# Patient Record
Sex: FEMALE | Race: WHITE | NOT HISPANIC OR LATINO | Employment: OTHER
[De-identification: names, ages, dates, MRNs, and addresses within clinical notes are randomized per-mention and may not be internally consistent; named-entity substitution may affect disease eponyms.]

---

## 2017-01-29 ENCOUNTER — RECORDS - HEALTHEAST (OUTPATIENT)
Dept: ADMINISTRATIVE | Facility: OTHER | Age: 72
End: 2017-01-29

## 2017-01-31 ENCOUNTER — COMMUNICATION - HEALTHEAST (OUTPATIENT)
Dept: INTERNAL MEDICINE | Facility: CLINIC | Age: 72
End: 2017-01-31

## 2017-02-05 ENCOUNTER — RECORDS - HEALTHEAST (OUTPATIENT)
Dept: ADMINISTRATIVE | Facility: OTHER | Age: 72
End: 2017-02-05

## 2017-02-08 ENCOUNTER — OFFICE VISIT - HEALTHEAST (OUTPATIENT)
Dept: FAMILY MEDICINE | Facility: CLINIC | Age: 72
End: 2017-02-08

## 2017-02-08 DIAGNOSIS — R50.9 FEVER: ICD-10-CM

## 2017-02-08 DIAGNOSIS — R05.9 COUGH: ICD-10-CM

## 2017-02-10 ENCOUNTER — COMMUNICATION - HEALTHEAST (OUTPATIENT)
Dept: FAMILY MEDICINE | Facility: CLINIC | Age: 72
End: 2017-02-10

## 2017-02-13 ENCOUNTER — OFFICE VISIT - HEALTHEAST (OUTPATIENT)
Dept: FAMILY MEDICINE | Facility: CLINIC | Age: 72
End: 2017-02-13

## 2017-02-13 ENCOUNTER — COMMUNICATION - HEALTHEAST (OUTPATIENT)
Dept: FAMILY MEDICINE | Facility: CLINIC | Age: 72
End: 2017-02-13

## 2017-02-13 DIAGNOSIS — R53.83 FATIGUE: ICD-10-CM

## 2017-02-13 DIAGNOSIS — R05.9 COUGH: ICD-10-CM

## 2017-02-14 ENCOUNTER — COMMUNICATION - HEALTHEAST (OUTPATIENT)
Dept: FAMILY MEDICINE | Facility: CLINIC | Age: 72
End: 2017-02-14

## 2017-02-17 ENCOUNTER — COMMUNICATION - HEALTHEAST (OUTPATIENT)
Dept: FAMILY MEDICINE | Facility: CLINIC | Age: 72
End: 2017-02-17

## 2017-03-28 ENCOUNTER — COMMUNICATION - HEALTHEAST (OUTPATIENT)
Dept: FAMILY MEDICINE | Facility: CLINIC | Age: 72
End: 2017-03-28

## 2017-03-28 ENCOUNTER — OFFICE VISIT - HEALTHEAST (OUTPATIENT)
Dept: FAMILY MEDICINE | Facility: CLINIC | Age: 72
End: 2017-03-28

## 2017-03-28 DIAGNOSIS — E03.9 HYPOTHYROIDISM: ICD-10-CM

## 2017-03-28 DIAGNOSIS — Z00.00 PHYSICAL EXAM: ICD-10-CM

## 2017-03-28 DIAGNOSIS — R53.83 FATIGUE: ICD-10-CM

## 2017-03-28 DIAGNOSIS — M54.50 LOW BACK PAIN: ICD-10-CM

## 2017-03-28 DIAGNOSIS — R42 VERTIGO: ICD-10-CM

## 2017-03-28 DIAGNOSIS — K59.00 CONSTIPATION: ICD-10-CM

## 2017-03-28 DIAGNOSIS — D64.9 ANEMIA: ICD-10-CM

## 2017-03-28 DIAGNOSIS — R01.1 HEART MURMUR: ICD-10-CM

## 2017-03-28 DIAGNOSIS — C54.1 ENDOMETRIAL CANCER (H): ICD-10-CM

## 2017-03-28 DIAGNOSIS — G47.00 INSOMNIA, UNSPECIFIED: ICD-10-CM

## 2017-03-28 LAB
CHOLEST SERPL-MCNC: 201 MG/DL
FASTING STATUS PATIENT QL REPORTED: YES
HDLC SERPL-MCNC: 88 MG/DL
LDLC SERPL CALC-MCNC: 100 MG/DL
TRIGL SERPL-MCNC: 63 MG/DL

## 2017-03-28 ASSESSMENT — MIFFLIN-ST. JEOR: SCORE: 1248.63

## 2017-04-05 ENCOUNTER — RECORDS - HEALTHEAST (OUTPATIENT)
Dept: BONE DENSITY | Facility: CLINIC | Age: 72
End: 2017-04-05

## 2017-04-05 ENCOUNTER — RECORDS - HEALTHEAST (OUTPATIENT)
Dept: ADMINISTRATIVE | Facility: OTHER | Age: 72
End: 2017-04-05

## 2017-04-05 DIAGNOSIS — Z78.0 ASYMPTOMATIC MENOPAUSAL STATE: ICD-10-CM

## 2017-04-05 DIAGNOSIS — Z00.00 ENCOUNTER FOR GENERAL ADULT MEDICAL EXAMINATION WITHOUT ABNORMAL FINDINGS: ICD-10-CM

## 2017-04-07 ENCOUNTER — HOSPITAL ENCOUNTER (OUTPATIENT)
Dept: CARDIOLOGY | Facility: CLINIC | Age: 72
Discharge: HOME | End: 2017-04-07
Attending: FAMILY MEDICINE

## 2017-04-07 DIAGNOSIS — R53.83 FATIGUE: ICD-10-CM

## 2017-04-07 DIAGNOSIS — R01.1 HEART MURMUR: ICD-10-CM

## 2017-04-07 LAB
AORTIC ROOT: 3.1 CM
AORTIC VALVE MEAN VELOCITY: 141 CM/S
AV DIMENSIONLESS INDEX VTI: 0.5
AV MEAN GRADIENT: 9 MMHG
AV PEAK GRADIENT: 17.6 MMHG
AV VALVE AREA: 1.6 CM2
AV VELOCITY RATIO: 0.5
BSA FOR ECHO PROCEDURE: 1.81 M2
CV BLOOD PRESSURE: NORMAL MMHG
CV ECHO HEIGHT: 69.5 IN
CV ECHO WEIGHT: 148 LBS
DOP CALC AO PEAK VEL: 210 CM/S
DOP CALC AO VTI: 44.4 CM
DOP CALC LVOT AREA: 3.14 CM2
DOP CALC LVOT DIAMETER: 2 CM
DOP CALC LVOT PEAK VEL: 113 CM/S
DOP CALC LVOT STROKE VOLUME: 71.3 CM3
DOP CALCLVOT PEAK VEL VTI: 22.7 CM
ECHO EJECTION FRACTION ESTIMATED: 65 %
EJECTION FRACTION: 72 % (ref 55–75)
FRACTIONAL SHORTENING: 38 % (ref 28–44)
INTERVENTRICULAR SEPTUM IN END DIASTOLE: 0.75 CM (ref 0.6–0.9)
IVS/PW RATIO: 0.9
LA AREA 1: 11.6 CM2
LA AREA 2: 9.67 CM2
LEFT ATRIUM LENGTH: 4.23 CM
LEFT ATRIUM SIZE: 3.5 CM
LEFT ATRIUM TO AORTIC ROOT RATIO: 1.13 NO UNITS
LEFT ATRIUM VOLUME INDEX: 12.5 ML/M2
LEFT ATRIUM VOLUME: 22.5 CM3
LEFT VENTRICLE CARDIAC INDEX: 3.2 L/MIN/M2
LEFT VENTRICLE CARDIAC OUTPUT: 5.7 L/MIN
LEFT VENTRICLE DIASTOLIC VOLUME INDEX: 29.3 CM3/M2 (ref 34–74)
LEFT VENTRICLE DIASTOLIC VOLUME: 53 CM3 (ref 46–106)
LEFT VENTRICLE HEART RATE: 80 BPM
LEFT VENTRICLE MASS INDEX: 96.3 G/M2
LEFT VENTRICLE SYSTOLIC VOLUME INDEX: 8.3 CM3/M2 (ref 11–31)
LEFT VENTRICLE SYSTOLIC VOLUME: 15 CM3 (ref 14–42)
LEFT VENTRICULAR INTERNAL DIMENSION IN DIASTOLE: 5.84 CM (ref 3.8–5.2)
LEFT VENTRICULAR INTERNAL DIMENSION IN SYSTOLE: 3.62 CM (ref 2.2–3.5)
LEFT VENTRICULAR MASS: 174.2 G
LEFT VENTRICULAR OUTFLOW TRACT MEAN GRADIENT: 3 MMHG
LEFT VENTRICULAR OUTFLOW TRACT MEAN VELOCITY: 77 CM/S
LEFT VENTRICULAR OUTFLOW TRACT PEAK GRADIENT: 5 MMHG
LEFT VENTRICULAR POSTERIOR WALL IN END DIASTOLE: 0.82 CM (ref 0.6–0.9)
LV STROKE VOLUME INDEX: 39.4 ML/M2
MITRAL VALVE DECELERATION SLOPE: 3070 MM/S2
MITRAL VALVE E/A RATIO: 1.1
MITRAL VALVE PRESSURE HALF-TIME: 71 MS
MV AVERAGE E/E' RATIO: 7.1 CM/S
MV DECELERATION TIME: 285 MS
MV E'TISSUE VEL-LAT: 10.9 CM/S
MV E'TISSUE VEL-MED: 8.77 CM/S
MV LATERAL E/E' RATIO: 6.4
MV MEDIAL E/E' RATIO: 8
MV PEAK A VELOCITY: 62.3 CM/S
MV PEAK E VELOCITY: 70.3 CM/S
MV VALVE AREA PRESSURE 1/2 METHOD: 3.1 CM2
NUC REST DIASTOLIC VOLUME INDEX: 2368 LBS
NUC REST SYSTOLIC VOLUME INDEX: 69.5 IN
TRICUSPID REGURGITATION PEAK PRESSURE GRADIENT: 22.8 MMHG
TRICUSPID VALVE PEAK REGURGITANT VELOCITY: 239 CM/S

## 2017-04-07 ASSESSMENT — MIFFLIN-ST. JEOR: SCORE: 1248.63

## 2017-04-10 ENCOUNTER — COMMUNICATION - HEALTHEAST (OUTPATIENT)
Dept: FAMILY MEDICINE | Facility: CLINIC | Age: 72
End: 2017-04-10

## 2017-04-11 ENCOUNTER — COMMUNICATION - HEALTHEAST (OUTPATIENT)
Dept: FAMILY MEDICINE | Facility: CLINIC | Age: 72
End: 2017-04-11

## 2017-04-12 ENCOUNTER — COMMUNICATION - HEALTHEAST (OUTPATIENT)
Dept: ADMINISTRATIVE | Facility: CLINIC | Age: 72
End: 2017-04-12

## 2017-04-28 ENCOUNTER — COMMUNICATION - HEALTHEAST (OUTPATIENT)
Dept: FAMILY MEDICINE | Facility: CLINIC | Age: 72
End: 2017-04-28

## 2017-06-15 ENCOUNTER — RECORDS - HEALTHEAST (OUTPATIENT)
Dept: ADMINISTRATIVE | Facility: OTHER | Age: 72
End: 2017-06-15

## 2017-06-26 ENCOUNTER — COMMUNICATION - HEALTHEAST (OUTPATIENT)
Dept: FAMILY MEDICINE | Facility: CLINIC | Age: 72
End: 2017-06-26

## 2017-06-26 DIAGNOSIS — E03.9 HYPOTHYROID: ICD-10-CM

## 2017-08-24 ENCOUNTER — RECORDS - HEALTHEAST (OUTPATIENT)
Dept: ADMINISTRATIVE | Facility: OTHER | Age: 72
End: 2017-08-24

## 2017-09-18 ENCOUNTER — COMMUNICATION - HEALTHEAST (OUTPATIENT)
Dept: FAMILY MEDICINE | Facility: CLINIC | Age: 72
End: 2017-09-18

## 2017-10-17 ENCOUNTER — AMBULATORY - HEALTHEAST (OUTPATIENT)
Dept: NURSING | Facility: CLINIC | Age: 72
End: 2017-10-17

## 2017-10-18 ENCOUNTER — COMMUNICATION - HEALTHEAST (OUTPATIENT)
Dept: SCHEDULING | Facility: CLINIC | Age: 72
End: 2017-10-18

## 2017-10-27 ENCOUNTER — RECORDS - HEALTHEAST (OUTPATIENT)
Dept: ADMINISTRATIVE | Facility: OTHER | Age: 72
End: 2017-10-27

## 2017-11-01 ENCOUNTER — RECORDS - HEALTHEAST (OUTPATIENT)
Dept: ADMINISTRATIVE | Facility: OTHER | Age: 72
End: 2017-11-01

## 2017-12-11 ENCOUNTER — COMMUNICATION - HEALTHEAST (OUTPATIENT)
Dept: FAMILY MEDICINE | Facility: CLINIC | Age: 72
End: 2017-12-11

## 2017-12-11 DIAGNOSIS — E03.9 HYPOTHYROID: ICD-10-CM

## 2018-01-09 ENCOUNTER — RECORDS - HEALTHEAST (OUTPATIENT)
Dept: ADMINISTRATIVE | Facility: OTHER | Age: 73
End: 2018-01-09

## 2018-02-13 ENCOUNTER — OFFICE VISIT - HEALTHEAST (OUTPATIENT)
Dept: FAMILY MEDICINE | Facility: CLINIC | Age: 73
End: 2018-02-13

## 2018-02-13 ENCOUNTER — COMMUNICATION - HEALTHEAST (OUTPATIENT)
Dept: FAMILY MEDICINE | Facility: CLINIC | Age: 73
End: 2018-02-13

## 2018-02-13 DIAGNOSIS — I35.0 AORTIC STENOSIS: ICD-10-CM

## 2018-02-13 DIAGNOSIS — Z12.31 VISIT FOR SCREENING MAMMOGRAM: ICD-10-CM

## 2018-02-13 DIAGNOSIS — M85.80 OSTEOPENIA: ICD-10-CM

## 2018-02-13 DIAGNOSIS — E03.9 HYPOTHYROIDISM: ICD-10-CM

## 2018-02-13 DIAGNOSIS — Z01.818 PREOP EXAMINATION: ICD-10-CM

## 2018-02-13 DIAGNOSIS — G47.00 INSOMNIA: ICD-10-CM

## 2018-02-13 DIAGNOSIS — Z12.39 SCREENING BREAST EXAMINATION: ICD-10-CM

## 2018-02-13 DIAGNOSIS — D64.9 ANEMIA: ICD-10-CM

## 2018-02-13 DIAGNOSIS — E03.9 HYPOTHYROID: ICD-10-CM

## 2018-02-13 DIAGNOSIS — Z12.39 SCREENING FOR BREAST CANCER: ICD-10-CM

## 2018-02-13 DIAGNOSIS — K59.00 CONSTIPATION: ICD-10-CM

## 2018-02-13 DIAGNOSIS — M54.50 LOW BACK PAIN: ICD-10-CM

## 2018-02-13 LAB
ANION GAP SERPL CALCULATED.3IONS-SCNC: 7 MMOL/L (ref 5–18)
ATRIAL RATE - MUSE: 75 BPM
BUN SERPL-MCNC: 16 MG/DL (ref 8–28)
CALCIUM SERPL-MCNC: 9.6 MG/DL (ref 8.5–10.5)
CHLORIDE BLD-SCNC: 104 MMOL/L (ref 98–107)
CO2 SERPL-SCNC: 30 MMOL/L (ref 22–31)
CREAT SERPL-MCNC: 0.73 MG/DL (ref 0.6–1.1)
DIASTOLIC BLOOD PRESSURE - MUSE: NORMAL MMHG
ERYTHROCYTE [DISTWIDTH] IN BLOOD BY AUTOMATED COUNT: 13.9 % (ref 11–14.5)
GFR SERPL CREATININE-BSD FRML MDRD: >60 ML/MIN/1.73M2
GLUCOSE BLD-MCNC: 91 MG/DL (ref 70–125)
HCT VFR BLD AUTO: 37.7 % (ref 35–47)
HGB BLD-MCNC: 12.4 G/DL (ref 12–16)
INTERPRETATION ECG - MUSE: NORMAL
MCH RBC QN AUTO: 29 PG (ref 27–34)
MCHC RBC AUTO-ENTMCNC: 32.9 G/DL (ref 32–36)
MCV RBC AUTO: 88 FL (ref 80–100)
P AXIS - MUSE: 79 DEGREES
PLATELET # BLD AUTO: 250 THOU/UL (ref 140–440)
PMV BLD AUTO: 6.4 FL (ref 7–10)
POTASSIUM BLD-SCNC: 4.2 MMOL/L (ref 3.5–5)
PR INTERVAL - MUSE: 162 MS
QRS DURATION - MUSE: 84 MS
QT - MUSE: 374 MS
QTC - MUSE: 417 MS
R AXIS - MUSE: 45 DEGREES
RBC # BLD AUTO: 4.29 MILL/UL (ref 3.8–5.4)
SODIUM SERPL-SCNC: 141 MMOL/L (ref 136–145)
SYSTOLIC BLOOD PRESSURE - MUSE: NORMAL MMHG
T AXIS - MUSE: 67 DEGREES
TSH SERPL DL<=0.005 MIU/L-ACNC: <0.01 UIU/ML (ref 0.3–5)
VENTRICULAR RATE- MUSE: 75 BPM
WBC: 6.9 THOU/UL (ref 4–11)

## 2018-02-13 ASSESSMENT — MIFFLIN-ST. JEOR: SCORE: 1238.43

## 2018-02-26 ENCOUNTER — RECORDS - HEALTHEAST (OUTPATIENT)
Dept: ADMINISTRATIVE | Facility: OTHER | Age: 73
End: 2018-02-26

## 2018-03-01 ENCOUNTER — AMBULATORY - HEALTHEAST (OUTPATIENT)
Dept: GERIATRICS | Facility: CLINIC | Age: 73
End: 2018-03-01

## 2018-03-01 ENCOUNTER — RECORDS - HEALTHEAST (OUTPATIENT)
Dept: ADMINISTRATIVE | Facility: OTHER | Age: 73
End: 2018-03-01

## 2018-03-06 ENCOUNTER — OFFICE VISIT - HEALTHEAST (OUTPATIENT)
Dept: GERIATRICS | Facility: CLINIC | Age: 73
End: 2018-03-06

## 2018-03-06 DIAGNOSIS — K59.00 CONSTIPATION: ICD-10-CM

## 2018-03-06 DIAGNOSIS — E03.9 HYPOTHYROIDISM: ICD-10-CM

## 2018-03-06 DIAGNOSIS — M54.50 LOW BACK PAIN: ICD-10-CM

## 2018-03-08 ENCOUNTER — OFFICE VISIT - HEALTHEAST (OUTPATIENT)
Dept: GERIATRICS | Facility: CLINIC | Age: 73
End: 2018-03-08

## 2018-03-08 DIAGNOSIS — I48.91 ATRIAL FIBRILLATION (H): ICD-10-CM

## 2018-03-08 DIAGNOSIS — K59.00 CONSTIPATION: ICD-10-CM

## 2018-03-08 DIAGNOSIS — M54.50 LOW BACK PAIN: ICD-10-CM

## 2018-03-09 ENCOUNTER — RECORDS - HEALTHEAST (OUTPATIENT)
Dept: ADMINISTRATIVE | Facility: OTHER | Age: 73
End: 2018-03-09

## 2018-03-12 ENCOUNTER — OFFICE VISIT - HEALTHEAST (OUTPATIENT)
Dept: FAMILY MEDICINE | Facility: CLINIC | Age: 73
End: 2018-03-12

## 2018-03-12 ENCOUNTER — AMBULATORY - HEALTHEAST (OUTPATIENT)
Dept: GERIATRICS | Facility: CLINIC | Age: 73
End: 2018-03-12

## 2018-03-12 DIAGNOSIS — M85.80 OSTEOPENIA: ICD-10-CM

## 2018-03-12 DIAGNOSIS — E03.9 HYPOTHYROIDISM: ICD-10-CM

## 2018-03-12 DIAGNOSIS — M54.50 LOW BACK PAIN: ICD-10-CM

## 2018-03-12 DIAGNOSIS — I48.91 ATRIAL FIBRILLATION (H): ICD-10-CM

## 2018-03-12 DIAGNOSIS — G47.00 INSOMNIA: ICD-10-CM

## 2018-03-13 ENCOUNTER — COMMUNICATION - HEALTHEAST (OUTPATIENT)
Dept: FAMILY MEDICINE | Facility: CLINIC | Age: 73
End: 2018-03-13

## 2018-03-13 ENCOUNTER — COMMUNICATION - HEALTHEAST (OUTPATIENT)
Dept: GERIATRICS | Facility: CLINIC | Age: 73
End: 2018-03-13

## 2018-03-13 ENCOUNTER — AMBULATORY - HEALTHEAST (OUTPATIENT)
Dept: FAMILY MEDICINE | Facility: CLINIC | Age: 73
End: 2018-03-13

## 2018-03-14 ENCOUNTER — COMMUNICATION - HEALTHEAST (OUTPATIENT)
Dept: FAMILY MEDICINE | Facility: CLINIC | Age: 73
End: 2018-03-14

## 2018-03-16 ENCOUNTER — COMMUNICATION - HEALTHEAST (OUTPATIENT)
Dept: FAMILY MEDICINE | Facility: CLINIC | Age: 73
End: 2018-03-16

## 2018-03-26 ENCOUNTER — COMMUNICATION - HEALTHEAST (OUTPATIENT)
Dept: FAMILY MEDICINE | Facility: CLINIC | Age: 73
End: 2018-03-26

## 2018-03-29 ENCOUNTER — COMMUNICATION - HEALTHEAST (OUTPATIENT)
Dept: FAMILY MEDICINE | Facility: CLINIC | Age: 73
End: 2018-03-29

## 2018-03-29 DIAGNOSIS — E03.9 HYPOTHYROIDISM: ICD-10-CM

## 2018-04-04 ENCOUNTER — COMMUNICATION - HEALTHEAST (OUTPATIENT)
Dept: FAMILY MEDICINE | Facility: CLINIC | Age: 73
End: 2018-04-04

## 2018-04-10 ENCOUNTER — COMMUNICATION - HEALTHEAST (OUTPATIENT)
Dept: FAMILY MEDICINE | Facility: CLINIC | Age: 73
End: 2018-04-10

## 2018-04-11 ENCOUNTER — AMBULATORY - HEALTHEAST (OUTPATIENT)
Dept: FAMILY MEDICINE | Facility: CLINIC | Age: 73
End: 2018-04-11

## 2018-04-12 ENCOUNTER — RECORDS - HEALTHEAST (OUTPATIENT)
Dept: ADMINISTRATIVE | Facility: OTHER | Age: 73
End: 2018-04-12

## 2018-04-18 ENCOUNTER — RECORDS - HEALTHEAST (OUTPATIENT)
Dept: ADMINISTRATIVE | Facility: OTHER | Age: 73
End: 2018-04-18

## 2018-04-26 ENCOUNTER — COMMUNICATION - HEALTHEAST (OUTPATIENT)
Dept: FAMILY MEDICINE | Facility: CLINIC | Age: 73
End: 2018-04-26

## 2018-04-27 ENCOUNTER — COMMUNICATION - HEALTHEAST (OUTPATIENT)
Dept: FAMILY MEDICINE | Facility: CLINIC | Age: 73
End: 2018-04-27

## 2018-05-15 ENCOUNTER — COMMUNICATION - HEALTHEAST (OUTPATIENT)
Dept: FAMILY MEDICINE | Facility: CLINIC | Age: 73
End: 2018-05-15

## 2018-05-16 ENCOUNTER — COMMUNICATION - HEALTHEAST (OUTPATIENT)
Dept: FAMILY MEDICINE | Facility: CLINIC | Age: 73
End: 2018-05-16

## 2018-05-16 ENCOUNTER — AMBULATORY - HEALTHEAST (OUTPATIENT)
Dept: FAMILY MEDICINE | Facility: CLINIC | Age: 73
End: 2018-05-16

## 2018-05-16 ENCOUNTER — AMBULATORY - HEALTHEAST (OUTPATIENT)
Dept: LAB | Facility: CLINIC | Age: 73
End: 2018-05-16

## 2018-05-16 DIAGNOSIS — E03.9 HYPOTHYROID: ICD-10-CM

## 2018-05-16 DIAGNOSIS — E03.9 HYPOTHYROIDISM: ICD-10-CM

## 2018-05-16 LAB — TSH SERPL DL<=0.005 MIU/L-ACNC: <0.01 UIU/ML (ref 0.3–5)

## 2018-06-05 ENCOUNTER — RECORDS - HEALTHEAST (OUTPATIENT)
Dept: ADMINISTRATIVE | Facility: OTHER | Age: 73
End: 2018-06-05

## 2018-07-02 ENCOUNTER — COMMUNICATION - HEALTHEAST (OUTPATIENT)
Dept: FAMILY MEDICINE | Facility: CLINIC | Age: 73
End: 2018-07-02

## 2018-07-02 DIAGNOSIS — E03.9 HYPOTHYROIDISM: ICD-10-CM

## 2018-07-06 ENCOUNTER — AMBULATORY - HEALTHEAST (OUTPATIENT)
Dept: FAMILY MEDICINE | Facility: CLINIC | Age: 73
End: 2018-07-06

## 2018-07-06 DIAGNOSIS — E03.9 HYPOTHYROIDISM: ICD-10-CM

## 2018-07-23 ENCOUNTER — AMBULATORY - HEALTHEAST (OUTPATIENT)
Dept: LAB | Facility: CLINIC | Age: 73
End: 2018-07-23

## 2018-07-23 DIAGNOSIS — E03.9 HYPOTHYROIDISM: ICD-10-CM

## 2018-07-23 LAB — TSH SERPL DL<=0.005 MIU/L-ACNC: 0.04 UIU/ML (ref 0.3–5)

## 2018-07-24 ENCOUNTER — RECORDS - HEALTHEAST (OUTPATIENT)
Dept: ADMINISTRATIVE | Facility: OTHER | Age: 73
End: 2018-07-24

## 2018-07-25 ENCOUNTER — OFFICE VISIT - HEALTHEAST (OUTPATIENT)
Dept: FAMILY MEDICINE | Facility: CLINIC | Age: 73
End: 2018-07-25

## 2018-07-25 ENCOUNTER — COMMUNICATION - HEALTHEAST (OUTPATIENT)
Dept: FAMILY MEDICINE | Facility: CLINIC | Age: 73
End: 2018-07-25

## 2018-07-25 DIAGNOSIS — G47.00 INSOMNIA: ICD-10-CM

## 2018-07-25 DIAGNOSIS — M54.50 LOW BACK PAIN: ICD-10-CM

## 2018-07-25 DIAGNOSIS — E03.9 HYPOTHYROIDISM: ICD-10-CM

## 2018-07-25 DIAGNOSIS — I48.91 ATRIAL FIBRILLATION (H): ICD-10-CM

## 2018-09-05 ENCOUNTER — COMMUNICATION - HEALTHEAST (OUTPATIENT)
Dept: FAMILY MEDICINE | Facility: CLINIC | Age: 73
End: 2018-09-05

## 2018-09-05 ENCOUNTER — HOSPITAL ENCOUNTER (OUTPATIENT)
Dept: MAMMOGRAPHY | Facility: CLINIC | Age: 73
Discharge: HOME | End: 2018-09-05
Attending: FAMILY MEDICINE

## 2018-09-05 DIAGNOSIS — Z12.31 VISIT FOR SCREENING MAMMOGRAM: ICD-10-CM

## 2018-09-17 ENCOUNTER — AMBULATORY - HEALTHEAST (OUTPATIENT)
Dept: LAB | Facility: CLINIC | Age: 73
End: 2018-09-17

## 2018-09-17 ENCOUNTER — AMBULATORY - HEALTHEAST (OUTPATIENT)
Dept: FAMILY MEDICINE | Facility: CLINIC | Age: 73
End: 2018-09-17

## 2018-09-17 ENCOUNTER — COMMUNICATION - HEALTHEAST (OUTPATIENT)
Dept: FAMILY MEDICINE | Facility: CLINIC | Age: 73
End: 2018-09-17

## 2018-09-17 DIAGNOSIS — E03.9 HYPOTHYROIDISM: ICD-10-CM

## 2018-09-17 LAB — TSH SERPL DL<=0.005 MIU/L-ACNC: 2.96 UIU/ML (ref 0.3–5)

## 2018-09-19 ENCOUNTER — OFFICE VISIT - HEALTHEAST (OUTPATIENT)
Dept: FAMILY MEDICINE | Facility: CLINIC | Age: 73
End: 2018-09-19

## 2018-09-19 DIAGNOSIS — G47.00 INSOMNIA: ICD-10-CM

## 2018-09-19 DIAGNOSIS — R01.1 HEART MURMUR: ICD-10-CM

## 2018-09-19 DIAGNOSIS — C54.1 ENDOMETRIAL CANCER (H): ICD-10-CM

## 2018-09-19 DIAGNOSIS — E03.9 HYPOTHYROIDISM: ICD-10-CM

## 2018-09-26 ENCOUNTER — RECORDS - HEALTHEAST (OUTPATIENT)
Dept: ADMINISTRATIVE | Facility: OTHER | Age: 73
End: 2018-09-26

## 2018-12-10 ENCOUNTER — COMMUNICATION - HEALTHEAST (OUTPATIENT)
Dept: FAMILY MEDICINE | Facility: CLINIC | Age: 73
End: 2018-12-10

## 2018-12-12 ENCOUNTER — OFFICE VISIT - HEALTHEAST (OUTPATIENT)
Dept: FAMILY MEDICINE | Facility: CLINIC | Age: 73
End: 2018-12-12

## 2018-12-12 DIAGNOSIS — Z71.84 COUNSELING ABOUT TRAVEL: ICD-10-CM

## 2019-02-14 ENCOUNTER — RECORDS - HEALTHEAST (OUTPATIENT)
Dept: ADMINISTRATIVE | Facility: OTHER | Age: 74
End: 2019-02-14

## 2019-03-31 ENCOUNTER — COMMUNICATION - HEALTHEAST (OUTPATIENT)
Dept: FAMILY MEDICINE | Facility: CLINIC | Age: 74
End: 2019-03-31

## 2019-04-03 ENCOUNTER — COMMUNICATION - HEALTHEAST (OUTPATIENT)
Dept: FAMILY MEDICINE | Facility: CLINIC | Age: 74
End: 2019-04-03

## 2019-04-09 ENCOUNTER — AMBULATORY - HEALTHEAST (OUTPATIENT)
Dept: LAB | Facility: CLINIC | Age: 74
End: 2019-04-09

## 2019-04-09 ENCOUNTER — AMBULATORY - HEALTHEAST (OUTPATIENT)
Dept: FAMILY MEDICINE | Facility: CLINIC | Age: 74
End: 2019-04-09

## 2019-04-09 DIAGNOSIS — E03.9 HYPOTHYROIDISM, UNSPECIFIED TYPE: ICD-10-CM

## 2019-04-09 LAB — TSH SERPL DL<=0.005 MIU/L-ACNC: 0.02 UIU/ML (ref 0.3–5)

## 2019-04-10 ENCOUNTER — OFFICE VISIT - HEALTHEAST (OUTPATIENT)
Dept: FAMILY MEDICINE | Facility: CLINIC | Age: 74
End: 2019-04-10

## 2019-04-10 DIAGNOSIS — E07.9 THYROID DISEASE: ICD-10-CM

## 2019-04-10 DIAGNOSIS — Z72.820 POOR SLEEP: ICD-10-CM

## 2019-04-10 LAB — T4 FREE SERPL-MCNC: 0.9 NG/DL (ref 0.7–1.8)

## 2019-04-11 ENCOUNTER — COMMUNICATION - HEALTHEAST (OUTPATIENT)
Dept: FAMILY MEDICINE | Facility: CLINIC | Age: 74
End: 2019-04-11

## 2019-04-11 LAB — THYROID PEROXIDASE ANTIBODIES - HISTORICAL: >2000 IU/ML (ref 0–5.6)

## 2019-04-12 ENCOUNTER — COMMUNICATION - HEALTHEAST (OUTPATIENT)
Dept: FAMILY MEDICINE | Facility: CLINIC | Age: 74
End: 2019-04-12

## 2019-04-19 ENCOUNTER — AMBULATORY - HEALTHEAST (OUTPATIENT)
Dept: FAMILY MEDICINE | Facility: CLINIC | Age: 74
End: 2019-04-19

## 2019-04-19 DIAGNOSIS — E07.9 THYROID DISEASE: ICD-10-CM

## 2019-04-23 ENCOUNTER — HOSPITAL ENCOUNTER (OUTPATIENT)
Dept: ULTRASOUND IMAGING | Facility: CLINIC | Age: 74
Discharge: HOME | End: 2019-04-23
Attending: FAMILY MEDICINE

## 2019-04-23 DIAGNOSIS — E07.9 THYROID DISEASE: ICD-10-CM

## 2019-04-24 ENCOUNTER — COMMUNICATION - HEALTHEAST (OUTPATIENT)
Dept: FAMILY MEDICINE | Facility: CLINIC | Age: 74
End: 2019-04-24

## 2019-05-07 ENCOUNTER — RECORDS - HEALTHEAST (OUTPATIENT)
Dept: ADMINISTRATIVE | Facility: OTHER | Age: 74
End: 2019-05-07

## 2019-05-23 ENCOUNTER — COMMUNICATION - HEALTHEAST (OUTPATIENT)
Dept: SCHEDULING | Facility: CLINIC | Age: 74
End: 2019-05-23

## 2019-05-29 ENCOUNTER — OFFICE VISIT - HEALTHEAST (OUTPATIENT)
Dept: FAMILY MEDICINE | Facility: CLINIC | Age: 74
End: 2019-05-29

## 2019-05-29 DIAGNOSIS — G47.00 INSOMNIA, UNSPECIFIED TYPE: ICD-10-CM

## 2019-05-29 DIAGNOSIS — I35.0 NONRHEUMATIC AORTIC VALVE STENOSIS: ICD-10-CM

## 2019-05-29 DIAGNOSIS — I48.91 ATRIAL FIBRILLATION, UNSPECIFIED TYPE (H): ICD-10-CM

## 2019-05-29 DIAGNOSIS — E07.9 THYROID DISEASE: ICD-10-CM

## 2019-05-29 DIAGNOSIS — R53.83 FATIGUE, UNSPECIFIED TYPE: ICD-10-CM

## 2019-05-29 DIAGNOSIS — K64.4 EXTERNAL HEMORRHOIDS: ICD-10-CM

## 2019-05-29 DIAGNOSIS — D64.9 ANEMIA, UNSPECIFIED TYPE: ICD-10-CM

## 2019-05-29 LAB
ANION GAP SERPL CALCULATED.3IONS-SCNC: 11 MMOL/L (ref 5–18)
BUN SERPL-MCNC: 14 MG/DL (ref 8–28)
CALCIUM SERPL-MCNC: 9.8 MG/DL (ref 8.5–10.5)
CHLORIDE BLD-SCNC: 100 MMOL/L (ref 98–107)
CO2 SERPL-SCNC: 26 MMOL/L (ref 22–31)
CREAT SERPL-MCNC: 0.78 MG/DL (ref 0.6–1.1)
ERYTHROCYTE [DISTWIDTH] IN BLOOD BY AUTOMATED COUNT: 12.7 % (ref 11–14.5)
FERRITIN SERPL-MCNC: 33 NG/ML (ref 10–130)
GFR SERPL CREATININE-BSD FRML MDRD: >60 ML/MIN/1.73M2
GLUCOSE BLD-MCNC: 81 MG/DL (ref 70–125)
HCT VFR BLD AUTO: 39.7 % (ref 35–47)
HGB BLD-MCNC: 13.2 G/DL (ref 12–16)
MCH RBC QN AUTO: 29.9 PG (ref 27–34)
MCHC RBC AUTO-ENTMCNC: 33.2 G/DL (ref 32–36)
MCV RBC AUTO: 90 FL (ref 80–100)
PLATELET # BLD AUTO: 226 THOU/UL (ref 140–440)
PMV BLD AUTO: 6.4 FL (ref 7–10)
POTASSIUM BLD-SCNC: 4.6 MMOL/L (ref 3.5–5)
RBC # BLD AUTO: 4.4 MILL/UL (ref 3.8–5.4)
SODIUM SERPL-SCNC: 137 MMOL/L (ref 136–145)
TSH SERPL DL<=0.005 MIU/L-ACNC: 0.08 UIU/ML (ref 0.3–5)
VIT B12 SERPL-MCNC: 700 PG/ML (ref 213–816)
WBC: 6 THOU/UL (ref 4–11)

## 2019-05-29 RX ORDER — CYCLOSPORINE 0.5 MG/ML
1 EMULSION OPHTHALMIC 2 TIMES DAILY
Status: SHIPPED | COMMUNITY
Start: 2019-05-29 | End: 2021-12-09

## 2019-05-31 ENCOUNTER — COMMUNICATION - HEALTHEAST (OUTPATIENT)
Dept: FAMILY MEDICINE | Facility: CLINIC | Age: 74
End: 2019-05-31

## 2019-06-07 ENCOUNTER — RECORDS - HEALTHEAST (OUTPATIENT)
Dept: ADMINISTRATIVE | Facility: OTHER | Age: 74
End: 2019-06-07

## 2019-06-18 ENCOUNTER — HOSPITAL ENCOUNTER (OUTPATIENT)
Dept: CARDIOLOGY | Facility: CLINIC | Age: 74
Discharge: HOME | End: 2019-06-18
Attending: FAMILY MEDICINE

## 2019-06-18 ENCOUNTER — COMMUNICATION - HEALTHEAST (OUTPATIENT)
Dept: FAMILY MEDICINE | Facility: CLINIC | Age: 74
End: 2019-06-18

## 2019-06-18 DIAGNOSIS — I35.0 NONRHEUMATIC AORTIC VALVE STENOSIS: ICD-10-CM

## 2019-06-18 LAB
AORTIC ROOT: 2.9 CM
AORTIC VALVE MEAN VELOCITY: 183 CM/S
AV DIMENSIONLESS INDEX VTI: 0.4
AV MEAN GRADIENT: 15 MMHG
AV PEAK GRADIENT: 24 MMHG
AV VALVE AREA: 1.3 CM2
AV VELOCITY RATIO: 0.4
BSA FOR ECHO PROCEDURE: 1.81 M2
CV BLOOD PRESSURE: ABNORMAL MMHG
CV ECHO HEIGHT: 68 IN
CV ECHO WEIGHT: 151 LBS
DOP CALC AO PEAK VEL: 245 CM/S
DOP CALC AO VTI: 51.7 CM
DOP CALC LVOT AREA: 3.14 CM2
DOP CALC LVOT DIAMETER: 2 CM
DOP CALC LVOT PEAK VEL: 109 CM/S
DOP CALC LVOT STROKE VOLUME: 65 CM3
DOP CALCLVOT PEAK VEL VTI: 20.7 CM
EJECTION FRACTION: 83 % (ref 55–75)
FRACTIONAL SHORTENING: 37.6 % (ref 28–44)
INTERVENTRICULAR SEPTUM IN END DIASTOLE: 0.64 CM (ref 0.6–0.9)
IVS/PW RATIO: 0.9
LA AREA 1: 9.58 CM2
LEFT ATRIUM LENGTH: 3.99 CM
LEFT ATRIUM SIZE: 2.8 CM
LEFT ATRIUM TO AORTIC ROOT RATIO: 0.97 NO UNITS
LEFT VENTRICLE CARDIAC INDEX: 2.7 L/MIN/M2
LEFT VENTRICLE CARDIAC OUTPUT: 4.9 L/MIN
LEFT VENTRICLE DIASTOLIC VOLUME INDEX: 29.8 CM3/M2 (ref 29–61)
LEFT VENTRICLE DIASTOLIC VOLUME: 54 CM3 (ref 46–106)
LEFT VENTRICLE HEART RATE: 76 BPM
LEFT VENTRICLE MASS INDEX: 53.5 G/M2
LEFT VENTRICLE SYSTOLIC VOLUME INDEX: 5 CM3/M2 (ref 8–24)
LEFT VENTRICLE SYSTOLIC VOLUME: 9 CM3 (ref 14–42)
LEFT VENTRICULAR INTERNAL DIMENSION IN DIASTOLE: 4.68 CM (ref 3.8–5.2)
LEFT VENTRICULAR INTERNAL DIMENSION IN SYSTOLE: 2.92 CM (ref 2.2–3.5)
LEFT VENTRICULAR MASS: 96.9 G
LEFT VENTRICULAR OUTFLOW TRACT MEAN GRADIENT: 2 MMHG
LEFT VENTRICULAR OUTFLOW TRACT MEAN VELOCITY: 68.4 CM/S
LEFT VENTRICULAR OUTFLOW TRACT PEAK GRADIENT: 5 MMHG
LEFT VENTRICULAR POSTERIOR WALL IN END DIASTOLE: 0.7 CM (ref 0.6–0.9)
LV STROKE VOLUME INDEX: 35.9 ML/M2
MITRAL VALVE E/A RATIO: 0.8
MV AVERAGE E/E' RATIO: 8.2 CM/S
MV DECELERATION TIME: 254 MS
MV E'TISSUE VEL-LAT: 11.2 CM/S
MV E'TISSUE VEL-MED: 6.63 CM/S
MV LATERAL E/E' RATIO: 6.5
MV MEDIAL E/E' RATIO: 11
MV PEAK A VELOCITY: 88.8 CM/S
MV PEAK E VELOCITY: 73.1 CM/S
NUC REST DIASTOLIC VOLUME INDEX: 2416 LBS
NUC REST SYSTOLIC VOLUME INDEX: 68 IN
TRICUSPID VALVE ANULAR PLANE SYSTOLIC EXCURSION: 2.3 CM

## 2019-06-18 ASSESSMENT — MIFFLIN-ST. JEOR: SCORE: 1238.43

## 2019-07-26 ENCOUNTER — COMMUNICATION - HEALTHEAST (OUTPATIENT)
Dept: FAMILY MEDICINE | Facility: CLINIC | Age: 74
End: 2019-07-26

## 2019-09-10 ENCOUNTER — RECORDS - HEALTHEAST (OUTPATIENT)
Dept: ADMINISTRATIVE | Facility: OTHER | Age: 74
End: 2019-09-10

## 2019-11-07 ENCOUNTER — COMMUNICATION - HEALTHEAST (OUTPATIENT)
Dept: FAMILY MEDICINE | Facility: CLINIC | Age: 74
End: 2019-11-07

## 2019-12-10 ENCOUNTER — OFFICE VISIT - HEALTHEAST (OUTPATIENT)
Dept: FAMILY MEDICINE | Facility: CLINIC | Age: 74
End: 2019-12-10

## 2019-12-10 DIAGNOSIS — D64.9 ANEMIA, UNSPECIFIED TYPE: ICD-10-CM

## 2019-12-10 DIAGNOSIS — I48.91 ATRIAL FIBRILLATION, UNSPECIFIED TYPE (H): ICD-10-CM

## 2019-12-10 DIAGNOSIS — I35.0 NONRHEUMATIC AORTIC VALVE STENOSIS: ICD-10-CM

## 2019-12-10 DIAGNOSIS — K59.00 CONSTIPATION, UNSPECIFIED CONSTIPATION TYPE: ICD-10-CM

## 2019-12-10 DIAGNOSIS — M54.50 LOW BACK PAIN, UNSPECIFIED BACK PAIN LATERALITY, UNSPECIFIED CHRONICITY, UNSPECIFIED WHETHER SCIATICA PRESENT: ICD-10-CM

## 2019-12-10 DIAGNOSIS — C54.1 ENDOMETRIAL CANCER (H): ICD-10-CM

## 2019-12-10 DIAGNOSIS — G47.00 INSOMNIA, UNSPECIFIED TYPE: ICD-10-CM

## 2019-12-10 DIAGNOSIS — Z00.00 WELLNESS EXAMINATION: ICD-10-CM

## 2019-12-10 DIAGNOSIS — E07.9 THYROID DISEASE: ICD-10-CM

## 2019-12-10 DIAGNOSIS — M85.80 OSTEOPENIA, UNSPECIFIED LOCATION: ICD-10-CM

## 2019-12-10 DIAGNOSIS — Z23 NEED FOR VACCINATION: ICD-10-CM

## 2019-12-10 LAB
ANION GAP SERPL CALCULATED.3IONS-SCNC: 8 MMOL/L (ref 5–18)
BUN SERPL-MCNC: 11 MG/DL (ref 8–28)
CALCIUM SERPL-MCNC: 9.2 MG/DL (ref 8.5–10.5)
CHLORIDE BLD-SCNC: 99 MMOL/L (ref 98–107)
CO2 SERPL-SCNC: 30 MMOL/L (ref 22–31)
CREAT SERPL-MCNC: 0.79 MG/DL (ref 0.6–1.1)
GFR SERPL CREATININE-BSD FRML MDRD: >60 ML/MIN/1.73M2
GLUCOSE BLD-MCNC: 88 MG/DL (ref 70–125)
POTASSIUM BLD-SCNC: 3.8 MMOL/L (ref 3.5–5)
SODIUM SERPL-SCNC: 137 MMOL/L (ref 136–145)
TSH SERPL DL<=0.005 MIU/L-ACNC: 3.54 UIU/ML (ref 0.3–5)

## 2019-12-10 ASSESSMENT — MIFFLIN-ST. JEOR: SCORE: 1258.84

## 2019-12-11 ENCOUNTER — COMMUNICATION - HEALTHEAST (OUTPATIENT)
Dept: FAMILY MEDICINE | Facility: CLINIC | Age: 74
End: 2019-12-11

## 2019-12-17 ENCOUNTER — AMBULATORY - HEALTHEAST (OUTPATIENT)
Dept: FAMILY MEDICINE | Facility: CLINIC | Age: 74
End: 2019-12-17

## 2019-12-17 ENCOUNTER — COMMUNICATION - HEALTHEAST (OUTPATIENT)
Dept: FAMILY MEDICINE | Facility: CLINIC | Age: 74
End: 2019-12-17

## 2019-12-17 DIAGNOSIS — G47.00 INSOMNIA, UNSPECIFIED TYPE: ICD-10-CM

## 2020-01-10 ENCOUNTER — COMMUNICATION - HEALTHEAST (OUTPATIENT)
Dept: SCHEDULING | Facility: CLINIC | Age: 75
End: 2020-01-10

## 2020-01-10 ENCOUNTER — OFFICE VISIT - HEALTHEAST (OUTPATIENT)
Dept: INTERNAL MEDICINE | Facility: CLINIC | Age: 75
End: 2020-01-10

## 2020-01-10 DIAGNOSIS — A08.4 VIRAL GASTROENTERITIS: ICD-10-CM

## 2020-03-17 ENCOUNTER — OFFICE VISIT - HEALTHEAST (OUTPATIENT)
Dept: FAMILY MEDICINE | Facility: CLINIC | Age: 75
End: 2020-03-17

## 2020-03-17 DIAGNOSIS — G47.00 INSOMNIA, UNSPECIFIED TYPE: ICD-10-CM

## 2020-03-17 DIAGNOSIS — K59.00 CONSTIPATION, UNSPECIFIED CONSTIPATION TYPE: ICD-10-CM

## 2020-03-17 DIAGNOSIS — R19.8 ABDOMINAL FULLNESS: ICD-10-CM

## 2020-03-17 DIAGNOSIS — R11.0 NAUSEA: ICD-10-CM

## 2020-03-17 LAB
ALBUMIN SERPL-MCNC: 4.4 G/DL (ref 3.5–5)
ALP SERPL-CCNC: 56 U/L (ref 45–120)
ALT SERPL W P-5'-P-CCNC: 21 U/L (ref 0–45)
ANION GAP SERPL CALCULATED.3IONS-SCNC: 14 MMOL/L (ref 5–18)
AST SERPL W P-5'-P-CCNC: 29 U/L (ref 0–40)
BILIRUB SERPL-MCNC: 0.3 MG/DL (ref 0–1)
BUN SERPL-MCNC: 14 MG/DL (ref 8–28)
C REACTIVE PROTEIN LHE: 0.1 MG/DL (ref 0–0.8)
CALCIUM SERPL-MCNC: 9.9 MG/DL (ref 8.5–10.5)
CHLORIDE BLD-SCNC: 99 MMOL/L (ref 98–107)
CO2 SERPL-SCNC: 27 MMOL/L (ref 22–31)
CREAT SERPL-MCNC: 0.85 MG/DL (ref 0.6–1.1)
ERYTHROCYTE [DISTWIDTH] IN BLOOD BY AUTOMATED COUNT: 12.9 % (ref 11–14.5)
ERYTHROCYTE [SEDIMENTATION RATE] IN BLOOD BY WESTERGREN METHOD: 7 MM/HR (ref 0–20)
GFR SERPL CREATININE-BSD FRML MDRD: >60 ML/MIN/1.73M2
GLUCOSE BLD-MCNC: 86 MG/DL (ref 70–125)
HCT VFR BLD AUTO: 42.6 % (ref 35–47)
HGB BLD-MCNC: 14.3 G/DL (ref 12–16)
MCH RBC QN AUTO: 31 PG (ref 27–34)
MCHC RBC AUTO-ENTMCNC: 33.5 G/DL (ref 32–36)
MCV RBC AUTO: 93 FL (ref 80–100)
PLATELET # BLD AUTO: 255 THOU/UL (ref 140–440)
PMV BLD AUTO: 6.7 FL (ref 7–10)
POTASSIUM BLD-SCNC: 4.3 MMOL/L (ref 3.5–5)
PROT SERPL-MCNC: 7.5 G/DL (ref 6–8)
RBC # BLD AUTO: 4.61 MILL/UL (ref 3.8–5.4)
SODIUM SERPL-SCNC: 140 MMOL/L (ref 136–145)
WBC: 6.2 THOU/UL (ref 4–11)

## 2020-03-19 ENCOUNTER — COMMUNICATION - HEALTHEAST (OUTPATIENT)
Dept: FAMILY MEDICINE | Facility: CLINIC | Age: 75
End: 2020-03-19

## 2020-03-19 LAB
GLIADIN IGA SER-ACNC: 1.6 U/ML
GLIADIN IGG SER-ACNC: 2 U/ML
IGA SERPL-MCNC: 394 MG/DL (ref 65–400)
TTG IGA SER-ACNC: 1.7 U/ML
TTG IGG SER-ACNC: <0.6 U/ML

## 2020-03-24 ENCOUNTER — COMMUNICATION - HEALTHEAST (OUTPATIENT)
Dept: FAMILY MEDICINE | Facility: CLINIC | Age: 75
End: 2020-03-24

## 2020-03-24 ENCOUNTER — HOSPITAL ENCOUNTER (OUTPATIENT)
Dept: CT IMAGING | Facility: CLINIC | Age: 75
Discharge: HOME | End: 2020-03-24
Attending: FAMILY MEDICINE

## 2020-03-24 DIAGNOSIS — R19.8 ABDOMINAL FULLNESS: ICD-10-CM

## 2020-03-24 DIAGNOSIS — K59.00 CONSTIPATION, UNSPECIFIED CONSTIPATION TYPE: ICD-10-CM

## 2020-03-24 DIAGNOSIS — R11.0 NAUSEA: ICD-10-CM

## 2020-04-24 ENCOUNTER — COMMUNICATION - HEALTHEAST (OUTPATIENT)
Dept: FAMILY MEDICINE | Facility: CLINIC | Age: 75
End: 2020-04-24

## 2020-04-24 ENCOUNTER — VIRTUAL VISIT (OUTPATIENT)
Dept: FAMILY MEDICINE | Facility: OTHER | Age: 75
End: 2020-04-24

## 2020-04-24 NOTE — PROGRESS NOTES
"Date: 2020 13:05:24  Clinician: Florecita Lantigua  Clinician NPI: [id number]  Patient: Iesha Patel  Patient : 1945  Patient Address: [address] [place] [address]  Patient Phone: [phone]  Visit Protocol: URI  Patient Summary:  Iesha is a 74 year old ( : 1945 ) female who initiated a Visit for COVID-19 (Coronavirus) evaluation and screening. When asked the question \"Please sign me up to receive news, health information and promotions. \", Iesha responded \"No\".    Iesha states her symptoms started gradually 3-4 days ago. After her symptoms started, they improved and then got worse again.   Her symptoms consist of a sore throat, malaise, myalgia, and a headache. She is experiencing mild difficulty breathing with activities but can speak normally in full sentences.   Symptom details     Sore throat: Iesha reports having mild throat pain (1-3 on a 10 point pain scale), does not have exudate on her tonsils, and can swallow liquids. She is not sure if the lymph nodes in her neck are enlarged. A rash has not appeared on the skin since the sore throat started.     Headache: She states the headache is moderate (4-6 on a 10 point pain scale).      Iesha denies having facial pain or pressure, cough, nasal congestion, ear pain, fever, rhinitis, wheezing, chills, vomiting, nausea, teeth pain, ageusia, anosmia, and diarrhea. She also denies taking antibiotic medication for the symptoms, having recent facial or sinus surgery in the past 60 days, and having a sinus infection within the past year.   Precipitating events  Within the past week, Iesha has not been exposed to someone with strep throat. She has not recently been exposed to someone with influenza. Iesha has been in close contact with the following high risk individuals: people with asthma, heart disease or diabetes.   Pertinent COVID-19 (Coronavirus) information  Iesha has not traveled internationally or to the areas where " COVID-19 (Coronavirus) is widespread, including cruise ship travel in the last 14 days before the start of her symptoms.   Iesha does not work or volunteer as healthcare worker or a  and does not work or volunteer in a healthcare facility.   She does not live with a healthcare worker.   Iesha has not had a close contact with a laboratory-confirmed COVID-19 patient within 14 days of symptom onset. She also has not had a close contact with a suspected COVID-19 patient within 14 days of symptom onset.   Pertinent medical history  Iesha does not get yeast infections when she takes antibiotics.   Iesha does not need a return to work/school note.   Weight: 150 lbs   Iesha does not smoke or use smokeless tobacco.   Weight: 150 lbs    MEDICATIONS: trazodone oral, Restasis MultiDose ophthalmic (eye), ALLERGIES: Penicillins  Clinician Response:  Dear Iesha,   Dear Iesha  Your symptoms show that you may have coronavirus (COVID-19). This illness can cause fever, cough and trouble breathing. Many people get a mild case and get better on their own. Some people can get very sick.  Will I be tested for COVID-19?  Because the virus is spreading, we are no longer testing most patients. You may request testing if:   You are very ill. For example, you're on chemotherapy, dialysis or home hospice care. (Contact your specialty clinic or program.)   You live in a nursing home or other long-term care facility. (Talk to your nurse manager or medical director.)   You're a health care worker. (Contact your employee health office.)   How can I protect others?  Without a test, we can't know for sure that you have COVID-19. For safety, it's very important to follow these rules.  First, stay home and away from others (self-isolate) until:   You've had no fever---and no medicine that reduces fever---for 3 full days (72 hours). And...    Your other symptoms have gotten better. For example, your cough or breathing has improved. And...    At least 7 days have passed since your symptoms started.   During this time:   Don't go to work, school or anywhere else.    Stay away from others in your home. No hugging, kissing or shaking hands.   Don't let anyone visit.   Cover your mouth and nose with a mask, tissue or wash cloth to avoid spreading germs.   Wash your hands and face often. Use soap and water.   How can I take care of myself?   1.Take Tylenol (acetaminophen) for fever or pain. If you have liver or kidney problems, ask your family doctor if it's okay to take Tylenol.        Adults can take either:    650 mg (two 325 mg pills) every 4 to 6 hours, or...   1,000 mg (two 500 mg pills) every 8 hours as needed.    Note: Don't take more than 3,000 mg in one day.   For children, check the Tylenol bottle for the right dose. The dose is based on the child's age or weight.   2.If you have other health problems (like cancer, heart failure, an organ transplant or severe kidney disease): Call your specialty clinic if you don't feel better in the next 2 days.       3.Know when to call 911: If your breathing is so bad that it keeps you from doing normal activities, call 911 or go to the emergency room. Tell them that you've been staying home and may have COVID-19.       4.Sign up for [employer]. We know it's scary to hear that you might have COVID-19. We want to track your symptoms to make sure you're okay over the next 2 weeks. Please look for an email from [employer]---this is a free, online program that we'll use to keep in touch. To sign up, follow the link in the email. Learn more at [email or web address].   Where can I get more information?  To learn more about COVID-19 and how to care for yourself at home, please visit the CDC website at https://www.cdc.gov/coronavirus/2019-ncov/about/steps-when-sick.html.  For more options for care at [facility], please visit our website at [email or web address]         Diagnosis: Cough  Diagnosis ICD: R05

## 2020-04-26 ENCOUNTER — RECORDS - HEALTHEAST (OUTPATIENT)
Dept: ADMINISTRATIVE | Facility: OTHER | Age: 75
End: 2020-04-26

## 2020-05-11 ENCOUNTER — COMMUNICATION - HEALTHEAST (OUTPATIENT)
Dept: FAMILY MEDICINE | Facility: CLINIC | Age: 75
End: 2020-05-11

## 2020-05-11 DIAGNOSIS — G47.00 INSOMNIA, UNSPECIFIED TYPE: ICD-10-CM

## 2020-08-07 ENCOUNTER — OFFICE VISIT - HEALTHEAST (OUTPATIENT)
Dept: FAMILY MEDICINE | Facility: CLINIC | Age: 75
End: 2020-08-07

## 2020-08-07 DIAGNOSIS — Z12.11 COLON CANCER SCREENING: ICD-10-CM

## 2020-08-07 DIAGNOSIS — Z76.89 ESTABLISHING CARE WITH NEW DOCTOR, ENCOUNTER FOR: ICD-10-CM

## 2020-08-07 DIAGNOSIS — K59.00 CONSTIPATION, UNSPECIFIED CONSTIPATION TYPE: ICD-10-CM

## 2020-08-07 DIAGNOSIS — L98.9 SKIN LESION: ICD-10-CM

## 2020-08-07 DIAGNOSIS — Z12.31 VISIT FOR SCREENING MAMMOGRAM: ICD-10-CM

## 2020-08-10 ENCOUNTER — RECORDS - HEALTHEAST (OUTPATIENT)
Dept: ADMINISTRATIVE | Facility: OTHER | Age: 75
End: 2020-08-10

## 2020-08-10 LAB — OCCULT BLOOD (FIT)_EXT (HISTORICAL CONVERSION): NEGATIVE

## 2020-08-26 ENCOUNTER — HOSPITAL ENCOUNTER (OUTPATIENT)
Dept: MAMMOGRAPHY | Facility: CLINIC | Age: 75
Discharge: HOME | End: 2020-08-26
Attending: FAMILY MEDICINE

## 2020-08-26 DIAGNOSIS — Z12.31 VISIT FOR SCREENING MAMMOGRAM: ICD-10-CM

## 2020-08-27 ENCOUNTER — COMMUNICATION - HEALTHEAST (OUTPATIENT)
Dept: FAMILY MEDICINE | Facility: CLINIC | Age: 75
End: 2020-08-27

## 2020-09-14 ENCOUNTER — RECORDS - HEALTHEAST (OUTPATIENT)
Dept: ADMINISTRATIVE | Facility: OTHER | Age: 75
End: 2020-09-14

## 2020-09-14 ENCOUNTER — RECORDS - HEALTHEAST (OUTPATIENT)
Dept: HEALTH INFORMATION MANAGEMENT | Facility: CLINIC | Age: 75
End: 2020-09-14

## 2020-09-25 ENCOUNTER — COMMUNICATION - HEALTHEAST (OUTPATIENT)
Dept: FAMILY MEDICINE | Facility: CLINIC | Age: 75
End: 2020-09-25

## 2020-09-28 ENCOUNTER — OFFICE VISIT - HEALTHEAST (OUTPATIENT)
Dept: FAMILY MEDICINE | Facility: CLINIC | Age: 75
End: 2020-09-28

## 2020-09-28 DIAGNOSIS — Z23 ENCOUNTER FOR IMMUNIZATION: ICD-10-CM

## 2020-09-28 DIAGNOSIS — R53.83 FATIGUE, UNSPECIFIED TYPE: ICD-10-CM

## 2020-09-28 DIAGNOSIS — Z00.00 HEALTHCARE MAINTENANCE: ICD-10-CM

## 2020-09-28 DIAGNOSIS — M85.80 OSTEOPENIA, UNSPECIFIED LOCATION: ICD-10-CM

## 2020-09-28 LAB
T4 FREE SERPL-MCNC: 0.6 NG/DL (ref 0.7–1.8)
TSH SERPL DL<=0.005 MIU/L-ACNC: 8.42 UIU/ML (ref 0.3–5)

## 2020-09-29 ENCOUNTER — COMMUNICATION - HEALTHEAST (OUTPATIENT)
Dept: FAMILY MEDICINE | Facility: CLINIC | Age: 75
End: 2020-09-29

## 2020-09-29 DIAGNOSIS — E03.9 HYPOTHYROIDISM, UNSPECIFIED TYPE: ICD-10-CM

## 2020-09-29 LAB — 25(OH)D3 SERPL-MCNC: 38.2 NG/ML (ref 30–80)

## 2020-10-29 ENCOUNTER — AMBULATORY - HEALTHEAST (OUTPATIENT)
Dept: OTHER | Facility: CLINIC | Age: 75
End: 2020-10-29

## 2020-10-29 ENCOUNTER — DOCUMENTATION ONLY (OUTPATIENT)
Dept: OTHER | Facility: CLINIC | Age: 75
End: 2020-10-29

## 2020-11-10 ENCOUNTER — COMMUNICATION - HEALTHEAST (OUTPATIENT)
Dept: FAMILY MEDICINE | Facility: CLINIC | Age: 75
End: 2020-11-10

## 2020-11-12 ENCOUNTER — AMBULATORY - HEALTHEAST (OUTPATIENT)
Dept: FAMILY MEDICINE | Facility: CLINIC | Age: 75
End: 2020-11-12

## 2020-11-12 DIAGNOSIS — E03.9 HYPOTHYROIDISM, UNSPECIFIED TYPE: ICD-10-CM

## 2020-11-17 ENCOUNTER — AMBULATORY - HEALTHEAST (OUTPATIENT)
Dept: LAB | Facility: CLINIC | Age: 75
End: 2020-11-17

## 2020-11-17 DIAGNOSIS — E03.9 HYPOTHYROIDISM, UNSPECIFIED TYPE: ICD-10-CM

## 2020-11-17 LAB
T4 FREE SERPL-MCNC: 0.8 NG/DL (ref 0.7–1.8)
TSH SERPL DL<=0.005 MIU/L-ACNC: 5.33 UIU/ML (ref 0.3–5)

## 2020-11-18 ENCOUNTER — COMMUNICATION - HEALTHEAST (OUTPATIENT)
Dept: FAMILY MEDICINE | Facility: CLINIC | Age: 75
End: 2020-11-18

## 2020-11-18 DIAGNOSIS — E03.9 HYPOTHYROIDISM, UNSPECIFIED TYPE: ICD-10-CM

## 2021-02-01 ENCOUNTER — COMMUNICATION - HEALTHEAST (OUTPATIENT)
Dept: FAMILY MEDICINE | Facility: CLINIC | Age: 76
End: 2021-02-01

## 2021-04-06 ENCOUNTER — OFFICE VISIT - HEALTHEAST (OUTPATIENT)
Dept: FAMILY MEDICINE | Facility: CLINIC | Age: 76
End: 2021-04-06

## 2021-04-06 DIAGNOSIS — Z11.59 ENCOUNTER FOR HEPATITIS C SCREENING TEST FOR LOW RISK PATIENT: ICD-10-CM

## 2021-04-06 DIAGNOSIS — R23.4 BREAST SKIN CHANGES: ICD-10-CM

## 2021-04-06 DIAGNOSIS — Z00.00 HEALTHCARE MAINTENANCE: ICD-10-CM

## 2021-04-06 DIAGNOSIS — N64.52 NIPPLE DISCHARGE: ICD-10-CM

## 2021-04-06 DIAGNOSIS — Z78.0 ASYMPTOMATIC MENOPAUSE: ICD-10-CM

## 2021-04-06 DIAGNOSIS — R53.82 CHRONIC FATIGUE: ICD-10-CM

## 2021-04-06 DIAGNOSIS — M85.80 OSTEOPENIA, UNSPECIFIED LOCATION: ICD-10-CM

## 2021-04-06 LAB
ALBUMIN SERPL-MCNC: 4.3 G/DL (ref 3.5–5)
ALP SERPL-CCNC: 54 U/L (ref 45–120)
ALT SERPL W P-5'-P-CCNC: 20 U/L (ref 0–45)
ANION GAP SERPL CALCULATED.3IONS-SCNC: 9 MMOL/L (ref 5–18)
AST SERPL W P-5'-P-CCNC: 25 U/L (ref 0–40)
BILIRUB SERPL-MCNC: 0.5 MG/DL (ref 0–1)
BUN SERPL-MCNC: 10 MG/DL (ref 8–28)
CALCIUM SERPL-MCNC: 8.8 MG/DL (ref 8.5–10.5)
CHLORIDE BLD-SCNC: 101 MMOL/L (ref 98–107)
CHOLEST SERPL-MCNC: 204 MG/DL
CO2 SERPL-SCNC: 28 MMOL/L (ref 22–31)
CREAT SERPL-MCNC: 0.78 MG/DL (ref 0.6–1.1)
FASTING STATUS PATIENT QL REPORTED: YES
GFR SERPL CREATININE-BSD FRML MDRD: >60 ML/MIN/1.73M2
GLUCOSE BLD-MCNC: 92 MG/DL (ref 70–125)
HDLC SERPL-MCNC: 85 MG/DL
HGB BLD-MCNC: 13.1 G/DL (ref 12–16)
LDLC SERPL CALC-MCNC: 106 MG/DL
POTASSIUM BLD-SCNC: 4.6 MMOL/L (ref 3.5–5)
PROT SERPL-MCNC: 6.9 G/DL (ref 6–8)
SODIUM SERPL-SCNC: 138 MMOL/L (ref 136–145)
TRIGL SERPL-MCNC: 66 MG/DL
TSH SERPL DL<=0.005 MIU/L-ACNC: 4.01 UIU/ML (ref 0.3–5)

## 2021-04-06 ASSESSMENT — MIFFLIN-ST. JEOR: SCORE: 1252.04

## 2021-04-07 LAB
25(OH)D3 SERPL-MCNC: 50.4 NG/ML (ref 30–80)
HCV AB SERPL QL IA: NEGATIVE

## 2021-04-08 ENCOUNTER — COMMUNICATION - HEALTHEAST (OUTPATIENT)
Dept: FAMILY MEDICINE | Facility: CLINIC | Age: 76
End: 2021-04-08

## 2021-04-08 DIAGNOSIS — I35.0 NONRHEUMATIC AORTIC VALVE STENOSIS: ICD-10-CM

## 2021-04-08 DIAGNOSIS — I25.10 ASCVD (ARTERIOSCLEROTIC CARDIOVASCULAR DISEASE): ICD-10-CM

## 2021-04-08 DIAGNOSIS — R53.82 CHRONIC FATIGUE: ICD-10-CM

## 2021-04-12 ENCOUNTER — COMMUNICATION - HEALTHEAST (OUTPATIENT)
Dept: FAMILY MEDICINE | Facility: CLINIC | Age: 76
End: 2021-04-12

## 2021-04-12 ENCOUNTER — AMBULATORY - HEALTHEAST (OUTPATIENT)
Dept: FAMILY MEDICINE | Facility: CLINIC | Age: 76
End: 2021-04-12

## 2021-04-12 DIAGNOSIS — R23.4 BREAST SKIN CHANGES: ICD-10-CM

## 2021-04-12 DIAGNOSIS — N64.52 NIPPLE DISCHARGE: ICD-10-CM

## 2021-04-14 ENCOUNTER — COMMUNICATION - HEALTHEAST (OUTPATIENT)
Dept: SURGERY | Facility: CLINIC | Age: 76
End: 2021-04-14

## 2021-04-15 ENCOUNTER — COMMUNICATION - HEALTHEAST (OUTPATIENT)
Dept: SURGERY | Facility: CLINIC | Age: 76
End: 2021-04-15

## 2021-04-27 ENCOUNTER — RECORDS - HEALTHEAST (OUTPATIENT)
Dept: BONE DENSITY | Facility: CLINIC | Age: 76
End: 2021-04-27

## 2021-04-27 ENCOUNTER — RECORDS - HEALTHEAST (OUTPATIENT)
Dept: ADMINISTRATIVE | Facility: OTHER | Age: 76
End: 2021-04-27

## 2021-04-27 DIAGNOSIS — M85.80 OTHER SPECIFIED DISORDERS OF BONE DENSITY AND STRUCTURE, UNSPECIFIED SITE: ICD-10-CM

## 2021-04-27 DIAGNOSIS — Z78.0 ASYMPTOMATIC MENOPAUSAL STATE: ICD-10-CM

## 2021-05-04 ENCOUNTER — HOSPITAL ENCOUNTER (OUTPATIENT)
Dept: ULTRASOUND IMAGING | Facility: CLINIC | Age: 76
Discharge: HOME | End: 2021-05-04
Attending: FAMILY MEDICINE
Payer: COMMERCIAL

## 2021-05-04 ENCOUNTER — HOSPITAL ENCOUNTER (OUTPATIENT)
Dept: MAMMOGRAPHY | Facility: CLINIC | Age: 76
Discharge: HOME | End: 2021-05-04
Attending: FAMILY MEDICINE
Payer: COMMERCIAL

## 2021-05-04 DIAGNOSIS — N64.52 NIPPLE DISCHARGE: ICD-10-CM

## 2021-05-04 DIAGNOSIS — R23.4 BREAST SKIN CHANGES: ICD-10-CM

## 2021-05-05 ENCOUNTER — HOSPITAL ENCOUNTER (OUTPATIENT)
Dept: SURGERY | Facility: CLINIC | Age: 76
Discharge: HOME | End: 2021-05-05
Attending: FAMILY MEDICINE
Payer: COMMERCIAL

## 2021-05-05 DIAGNOSIS — N64.52 DISCHARGE FROM RIGHT NIPPLE: ICD-10-CM

## 2021-05-05 ASSESSMENT — MIFFLIN-ST. JEOR: SCORE: 1252.04

## 2021-05-14 ENCOUNTER — HOSPITAL ENCOUNTER (OUTPATIENT)
Dept: CARDIOLOGY | Facility: CLINIC | Age: 76
Discharge: HOME | End: 2021-05-14
Attending: FAMILY MEDICINE
Payer: COMMERCIAL

## 2021-05-14 DIAGNOSIS — I35.0 NONRHEUMATIC AORTIC VALVE STENOSIS: ICD-10-CM

## 2021-05-14 DIAGNOSIS — R53.82 CHRONIC FATIGUE: ICD-10-CM

## 2021-05-14 LAB
AORTIC ROOT: 2.8 CM
AORTIC VALVE MEAN VELOCITY: 182 CM/S
AV DIMENSIONLESS INDEX VTI: 0.4
AV MEAN GRADIENT: 15 MMHG
AV PEAK GRADIENT: 26.6 MMHG
AV VALVE AREA: 1.1 CM2
AV VELOCITY RATIO: 0.4
BSA FOR ECHO PROCEDURE: 1.83 M2
CV BLOOD PRESSURE: ABNORMAL MMHG
CV ECHO HEIGHT: 68 IN
CV ECHO WEIGHT: 154 LBS
DOP CALC AO PEAK VEL: 258 CM/S
DOP CALC AO VTI: 57 CM
DOP CALC LVOT AREA: 2.83 CM2
DOP CALC LVOT DIAMETER: 1.9 CM
DOP CALC LVOT PEAK VEL: 106 CM/S
DOP CALC LVOT STROKE VOLUME: 63.8 CM3
DOP CALCLVOT PEAK VEL VTI: 22.5 CM
EJECTION FRACTION: 74 % (ref 55–75)
FRACTIONAL SHORTENING: 34.1 % (ref 28–44)
INTERVENTRICULAR SEPTUM IN END DIASTOLE: 0.94 CM (ref 0.6–0.9)
IVS/PW RATIO: 1.2
LA AREA 1: 7.7 CM2
LA AREA 2: 9.2 CM2
LEFT ATRIUM LENGTH: 3.8 CM
LEFT ATRIUM SIZE: 2.9 CM
LEFT ATRIUM TO AORTIC ROOT RATIO: 1.04 NO UNITS
LEFT ATRIUM VOLUME INDEX: 8.7 ML/M2
LEFT ATRIUM VOLUME: 15.8 ML
LEFT VENTRICLE CARDIAC INDEX: 2.7 L/MIN/M2
LEFT VENTRICLE CARDIAC OUTPUT: 5 L/MIN
LEFT VENTRICLE DIASTOLIC VOLUME INDEX: 22.8 CM3/M2 (ref 29–61)
LEFT VENTRICLE DIASTOLIC VOLUME: 41.8 CM3 (ref 46–106)
LEFT VENTRICLE HEART RATE: 78 BPM
LEFT VENTRICLE MASS INDEX: 56.2 G/M2
LEFT VENTRICLE SYSTOLIC VOLUME INDEX: 6 CM3/M2 (ref 8–24)
LEFT VENTRICLE SYSTOLIC VOLUME: 10.9 CM3 (ref 14–42)
LEFT VENTRICULAR INTERNAL DIMENSION IN DIASTOLE: 4.05 CM (ref 3.8–5.2)
LEFT VENTRICULAR INTERNAL DIMENSION IN SYSTOLE: 2.67 CM (ref 2.2–3.5)
LEFT VENTRICULAR MASS: 102.8 G
LEFT VENTRICULAR OUTFLOW TRACT MEAN GRADIENT: 2 MMHG
LEFT VENTRICULAR OUTFLOW TRACT MEAN VELOCITY: 72.2 CM/S
LEFT VENTRICULAR OUTFLOW TRACT PEAK GRADIENT: 4 MMHG
LEFT VENTRICULAR POSTERIOR WALL IN END DIASTOLE: 0.75 CM (ref 0.6–0.9)
LV STROKE VOLUME INDEX: 34.8 ML/M2
MITRAL VALVE E/A RATIO: 1.1
MV AVERAGE E/E' RATIO: 9.4 CM/S
MV DECELERATION TIME: 257 MS
MV E'TISSUE VEL-LAT: 9.09 CM/S
MV E'TISSUE VEL-MED: 7.45 CM/S
MV LATERAL E/E' RATIO: 8.6
MV MEDIAL E/E' RATIO: 10.5
MV PEAK A VELOCITY: 73.7 CM/S
MV PEAK E VELOCITY: 78.1 CM/S
NUC REST DIASTOLIC VOLUME INDEX: 2464 LBS
NUC REST SYSTOLIC VOLUME INDEX: 68 IN
PV ACCELERATION TIME: 141 MS
PV ACCELERATION TIME: 141 MS
TRICUSPID VALVE ANULAR PLANE SYSTOLIC EXCURSION: 2.1 CM

## 2021-05-14 ASSESSMENT — MIFFLIN-ST. JEOR: SCORE: 1252.04

## 2021-05-17 ENCOUNTER — COMMUNICATION - HEALTHEAST (OUTPATIENT)
Dept: FAMILY MEDICINE | Facility: CLINIC | Age: 76
End: 2021-05-17

## 2021-05-17 DIAGNOSIS — I35.0 AORTIC VALVE STENOSIS, ETIOLOGY OF CARDIAC VALVE DISEASE UNSPECIFIED: ICD-10-CM

## 2021-05-24 ENCOUNTER — COMMUNICATION - HEALTHEAST (OUTPATIENT)
Dept: FAMILY MEDICINE | Facility: CLINIC | Age: 76
End: 2021-05-24

## 2021-05-24 ENCOUNTER — RECORDS - HEALTHEAST (OUTPATIENT)
Dept: ADMINISTRATIVE | Facility: CLINIC | Age: 76
End: 2021-05-24

## 2021-05-24 DIAGNOSIS — E03.9 HYPOTHYROIDISM, UNSPECIFIED TYPE: ICD-10-CM

## 2021-05-25 RX ORDER — LEVOTHYROXINE SODIUM 25 UG/1
TABLET ORAL
Qty: 90 TABLET | Refills: 3 | Status: SHIPPED | OUTPATIENT
Start: 2021-05-25 | End: 2021-12-10

## 2021-05-27 VITALS — HEIGHT: 68 IN | WEIGHT: 154 LBS | BODY MASS INDEX: 23.34 KG/M2

## 2021-05-27 VITALS — BODY MASS INDEX: 23.34 KG/M2 | WEIGHT: 154 LBS | HEIGHT: 68 IN

## 2021-05-27 NOTE — TELEPHONE ENCOUNTER
Question following Office Visit  When did you see your provider: 4/10/19  What is your question: Patient stated she is not able to get in to see an endocrinologist until 6/6. Patient stated she feels lousy and wants to know what she should do while she waits for her appointment. Patient also stated she would like to know if her thyroid is not functioning, is this going to get worse? Patient is worried about this since she is going on a once in a lifetime cruise in July 2019.  Okay to leave a detailed message: No  [phone]

## 2021-05-27 NOTE — PROGRESS NOTES
ASSESSMENT:  1. Thyroid disease  The patient has a history of both having signs and symptoms consistent with both hyperthyroidism and hypothyroidism, I suspect that she currently is hyperthyroid.  Possibly Graves' disease.  Patient's TSH is essentially undetectable she is not currently on any thyroid replacement which she has been on previously.  - Ambulatory referral to Endocrinology    2. Poor sleep  Patient is having poor sleep patterns possibly because of thyroid dysfunction.  - zolpidem (AMBIEN) 5 MG tablet; Take 1 tablet (5 mg total) by mouth at bedtime as needed for sleep.  Dispense: 30 tablet; Refill: 0        PLAN:  1.  TSH was already drawn I added a free T4 and TPO  2.  Referral to endocrinology, explained to the patient that I assume she is going to need more thyroid testing  3.  For sleep issues Ambien 5 mg at night as needed.  4.  I would like to see the patient back in 3 months.    Orders Placed This Encounter   Procedures     Ambulatory referral to Endocrinology     Referral Priority:   Routine     Referral Type:   Consultation     Referral Reason:   Evaluation and Treatment     Requested Specialty:   Endocrinology     Number of Visits Requested:   1     Medications Discontinued During This Encounter   Medication Reason     RESTASIS MULTIDOSE 0.05 % Drop Therapy completed     ciprofloxacin HCl (CIPRO) 500 MG tablet Therapy completed       Return in about 6 months (around 10/10/2019) for Next scheduled follow up.    CHIEF COMPLAINT:  Chief Complaint   Patient presents with     Sleeping Problem     thinks due to thyroid      Fatigue     Test Results     discuss labs from yesterday        SUBJECTIVE:  Iesha is a 73 y.o. female who presents with fatigue. Patient had labs done yesterday and her TSH was 0.02. She is not taking any thyroid medication. She explains that she has been feeling very tired and has not been sleeping well. She used to be able to walk 2 miles a day with no problem but now she  struggles to walk 1.5 miles and feels exhausted after. Some days she has more energy than others. She mentions that she either feels cold or hot.     REVIEW OF SYSTEMS:   All other systems are negative.    PFSH:  Surgery: She had lumbar fusion surgery.    Immunization History   Administered Date(s) Administered     DT (pediatric) 02/07/2006     Hep A, Adult IM (19yr & older) 08/26/2010, 08/11/2011     Hep A, historic 08/26/2010, 08/11/2011     Influenza high dose, seasonal 10/17/2017, 09/19/2018     Pneumo Conj 13-V (2010&after) 03/28/2017     Td, Adult, Absorbed 05/20/2009     Td,adult,historic,unspecified 05/20/2009     Typhoid, Inj, Inactive 12/12/2018     Social History     Socioeconomic History     Marital status:      Spouse name: Not on file     Number of children: 3     Years of education: Not on file     Highest education level: Not on file   Occupational History     Occupation:      Employer: RETIRED   Social Needs     Financial resource strain: Not on file     Food insecurity:     Worry: Not on file     Inability: Not on file     Transportation needs:     Medical: Not on file     Non-medical: Not on file   Tobacco Use     Smoking status: Never Smoker     Smokeless tobacco: Never Used   Substance and Sexual Activity     Alcohol use: No     Drug use: No     Sexual activity: Not on file   Lifestyle     Physical activity:     Days per week: Not on file     Minutes per session: Not on file     Stress: Not on file   Relationships     Social connections:     Talks on phone: Not on file     Gets together: Not on file     Attends Buddhism service: Not on file     Active member of club or organization: Not on file     Attends meetings of clubs or organizations: Not on file     Relationship status: Not on file     Intimate partner violence:     Fear of current or ex partner: Not on file     Emotionally abused: Not on file     Physically abused: Not on file     Forced sexual activity: Not on file    Other Topics Concern     Not on file   Social History Narrative    Diet- Regular        Exercise- Now nothing, walking in past     No past medical history on file.  Family History   Problem Relation Age of Onset     Cancer Brother         testicular, bladder, kidney, skin     Diabetes type II Brother      Prostate cancer Brother      Heart disease Brother 59        heart attack      Valvular heart disease Mother      Diabetes type II Mother      Stroke Father      Diabetes Brother      Cancer Brother      Allergies Brother        MEDICATIONS:  Current Outpatient Medications   Medication Sig Dispense Refill     aspirin 81 MG EC tablet 81 mg daily.       MAGNESIUM HYDROXIDE (FOLEY MILK OF MAGNESIA ORAL) Take 1 capsule by mouth daily.       metoprolol succinate (TOPROL-XL) 50 MG 24 hr tablet        multivitamin with minerals (THERA-M) 9 mg iron-400 mcg Tab tablet Take 1 tablet by mouth daily.       POLYETHYLENE GLYCOL 3350 (MIRALAX ORAL) Powder.       zolpidem (AMBIEN) 5 MG tablet Take 1 tablet (5 mg total) by mouth at bedtime as needed for sleep. 30 tablet 0     No current facility-administered medications for this visit.        TOBACCO USE:  Social History     Tobacco Use   Smoking Status Never Smoker   Smokeless Tobacco Never Used       VITALS:  Vitals:    04/10/19 0923   BP: 104/64   Pulse: 63   SpO2: 98%   Weight: 153 lb 4.8 oz (69.5 kg)     Wt Readings from Last 3 Encounters:   04/10/19 153 lb 4.8 oz (69.5 kg)   12/12/18 152 lb 14.4 oz (69.4 kg)   09/19/18 154 lb (69.9 kg)       PHYSICAL EXAM:  Constitutional:   Reveals a healthy appearing female.  Vitals: per nursing notes.  HEENT:  Ears:  External canals, TMs clear.    Eyes:  EOMs full, PERRL.  Lungs: Clear to A&P without rales or wheezes.  Respiratory effort normal.  Cardiac:   Regular rate and rhythm, normal S1, S2, no murmur or gallop.  Musculoskeletal: No peripheral swelling.  Neuro:  Alert and oriented. Cranial nerves, motor, sensory exams are intact.   No gross focal deficits.  Psychiatric:  Memory intact, mood appropriate.    QUALITY MEASURES:      DATA REVIEWED:  Additional History from Old Records Summarized (2):   Decision to Obtain Records (1):   Radiology Tests Summarized or Ordered (1):   Labs Reviewed or Ordered (1): Reviewed labs from 4/09/19: TSH: 0.02. Ordered future labs.  Medicine Test Summarized or Ordered (1):   Independent Review of EKG, X-RAY, or RAPID STREP (2 each):     The visit lasted a total of 16 minutes face to face with the patient. Over 50% of the time was spent counseling and educating the patient about her above concerns.    By signing my name below, I, Aureliano Black, attest that this documentation has been prepared under the direction and in the presence of Dr. Jamshid Lala.  Electronic Signature: Rick Benites. 4/10/2019 9:37 AM.    I, Dr. Lala, personally performed the services described in this documentation. All medical record entries made by the scribe were at my direction and in my presence. I have reviewed the chart and discharge instructions (if applicable) and agree that the record reflects my personal performance and is accurate and complete.      Total data points: 1

## 2021-05-27 NOTE — TELEPHONE ENCOUNTER
Fax received from [facility], they have started the Prior Authorization Process via Cover My Meds    CoverMyMeds Key: [id number]    Medication Name: Zolpidem 5mg tablet    Insurance Plan: Medicare Part D  PBM: Keshav  Patient ID: not provided on fax    Please complete the PA process

## 2021-05-27 NOTE — TELEPHONE ENCOUNTER
Orders being requested: Thyroid level  Reason service is needed/diagnosis: Patient is asking to come in and have her thyroid levels checked prior to her office visit. Patient has been experiencing increased fatigue during the day and does not sleep well at night. Please advise.  When are orders needed by: 1-2 days prior to her appointment  Where to send Orders: Enter into [facility] for [facility] lab.  Okay to leave detailed message?  Yes [phone]

## 2021-05-27 NOTE — TELEPHONE ENCOUNTER
Central PA team  [phone]  Pool: HE PA MED ([id number])    PA has been initiated.       PA form completed and faxed insurance via Cover My Meds     Key:  [id number]     Medication:  ZOLPIDEM 5MG    Insurance:  [employer]        Response will be received via fax and may take up to 5-10 business days depending on plan

## 2021-05-28 ENCOUNTER — OFFICE VISIT - HEALTHEAST (OUTPATIENT)
Dept: CARDIOLOGY | Facility: CLINIC | Age: 76
End: 2021-05-28

## 2021-05-28 DIAGNOSIS — R53.83 FATIGUE: ICD-10-CM

## 2021-05-28 DIAGNOSIS — I35.0 AORTIC STENOSIS: ICD-10-CM

## 2021-05-28 LAB
ATRIAL RATE - MUSE: 63 BPM
DIASTOLIC BLOOD PRESSURE - MUSE: NORMAL
INTERPRETATION ECG - MUSE: NORMAL
P AXIS - MUSE: 85 DEGREES
PR INTERVAL - MUSE: 172 MS
QRS DURATION - MUSE: 84 MS
QT - MUSE: 404 MS
QTC - MUSE: 413 MS
R AXIS - MUSE: 60 DEGREES
SYSTOLIC BLOOD PRESSURE - MUSE: NORMAL
T AXIS - MUSE: 74 DEGREES
VENTRICULAR RATE- MUSE: 63 BPM

## 2021-05-29 ENCOUNTER — RECORDS - HEALTHEAST (OUTPATIENT)
Dept: ADMINISTRATIVE | Facility: CLINIC | Age: 76
End: 2021-05-29

## 2021-05-29 NOTE — PATIENT INSTRUCTIONS - HE
I will comment on laboratory studies and the echocardiogram, probably need to see you back at some point in the near future depending on the results of the above studies.

## 2021-05-29 NOTE — PROGRESS NOTES
ASSESSMENT:  1. Thyroid disease  Patient with underlying thyroid disease I suspect some type of inflammation, at times she has hyper thyroid at other times hypothyroid, currently not of any thyroid replacement.  - Thyroid Stimulating Hormone (TSH)    2. Insomnia  Chronic and long-standing Ambien is only been partially helpful.    3. Anemia  Also long-standing, unclear etiology but I do not think iron deficiency.  - Basic Metabolic Panel  - HM2(CBC w/o Differential)  - Ferritin  - Vitamin B12    4. Fatigue  Probably multifactorial, there may be an element of thyroid disease, sleep deprivation, the patient's metoprolol seems to have been a contributing factor, other physiologic changes.  Sleep apnea is a possibility though I think the patient is relatively low risk.    5. Aortic valve stenosis  Noted 2 years ago, moderate could be a contributing factor to fatigue.  - Echo Complete; Future    6. External hemorrhoids  Recent onset  - hydrocortisone (ANUSOL-HC) 2.5 % rectal cream; Apply rectally 2 times daily  Dispense: 30 g; Refill: 1        PLAN:  1.  Laboratory studies as above.  2.  Cardiac echocardiogram.  3.  Defer referral to sleep specialist for now, though that is a potential future possibility.  4.  Anusol cream for the hemorrhoids  5.  Patient is also followed by endocrinology.  6.  The patient for now can continue to hold metoprolol, were not can restart this, if she goes back into atrial fibrillation would have to reassess but would consider another agent besides a beta-blocker.  7.  She can continue to take the Ambien as needed I told her she could try on occasion to take two 5 mg tablets of Ambien.  8.  Depending on the above results I do anticipate some type of short-term follow-up.    Orders Placed This Encounter   Procedures     Basic Metabolic Panel     HM2(CBC w/o Differential)     Ferritin     Vitamin B12     Thyroid Stimulating Hormone (TSH)     Echo Complete     Standing Status:   Future      Standing Expiration Date:   5/29/2020     Medications Discontinued During This Encounter   Medication Reason     metoprolol succinate (TOPROL-XL) 50 MG 24 hr tablet Therapy completed       No follow-ups on file.    CHIEF COMPLAINT:  Chief Complaint   Patient presents with     Follow-up     Fatigue. seen improvements - stopped taking metoprolol       SUBJECTIVE:  Iesha is a 73 y.o. female who presents for a fatigue follow-up. Patient states that her friend told her that metoprolol can cause fatigue. She explains that she weaned off of metoprolol and has noticed a big difference. She has been feeling much better in regards to her fatigue. She has had longstanding sleep issues. She mentions that she has only taken Ambien a few times and it does not seem to be that effective. She inquires about a sleep study. She mentions that she currently has hemorrhoids. She has not had any issues with hemorrhoids for many years until now.     REVIEW OF SYSTEMS:   All other systems are negative.    PFSH:  Immunization History   Administered Date(s) Administered     DT (pediatric) 02/07/2006     Hep A, Adult IM (19yr & older) 08/26/2010, 08/11/2011     Hep A, historic 08/26/2010, 08/11/2011     Influenza high dose, seasonal 10/17/2017, 09/19/2018     Pneumo Conj 13-V (2010&after) 03/28/2017     Td, Adult, Absorbed 05/20/2009     Td,adult,historic,unspecified 05/20/2009     Typhoid, Inj, Inactive 12/12/2018     Social History     Socioeconomic History     Marital status:      Spouse name: Not on file     Number of children: 3     Years of education: Not on file     Highest education level: Not on file   Occupational History     Occupation:      Employer: RETIRED   Social Needs     Financial resource strain: Not on file     Food insecurity:     Worry: Not on file     Inability: Not on file     Transportation needs:     Medical: Not on file     Non-medical: Not on file   Tobacco Use     Smoking status: Never Smoker      Smokeless tobacco: Never Used   Substance and Sexual Activity     Alcohol use: No     Drug use: No     Sexual activity: Not on file   Lifestyle     Physical activity:     Days per week: Not on file     Minutes per session: Not on file     Stress: Not on file   Relationships     Social connections:     Talks on phone: Not on file     Gets together: Not on file     Attends Sikhism service: Not on file     Active member of club or organization: Not on file     Attends meetings of clubs or organizations: Not on file     Relationship status: Not on file     Intimate partner violence:     Fear of current or ex partner: Not on file     Emotionally abused: Not on file     Physically abused: Not on file     Forced sexual activity: Not on file   Other Topics Concern     Not on file   Social History Narrative    Diet- Regular        Exercise- Now nothing, walking in past     No past medical history on file.  Family History   Problem Relation Age of Onset     Cancer Brother         testicular, bladder, kidney, skin     Diabetes type II Brother      Prostate cancer Brother      Heart disease Brother 59        heart attack      Valvular heart disease Mother      Diabetes type II Mother      Stroke Father      Diabetes Brother      Cancer Brother      Allergies Brother        MEDICATIONS:  Current Outpatient Medications   Medication Sig Dispense Refill     aspirin 81 MG EC tablet 81 mg daily.       cycloSPORINE (RESTASIS) 0.05 % ophthalmic emulsion Administer 1 drop to both eyes 2 (two) times a day.       MAGNESIUM HYDROXIDE (FOLEY MILK OF MAGNESIA ORAL) Take 1 capsule by mouth daily.       multivitamin with minerals (THERA-M) 9 mg iron-400 mcg Tab tablet Take 1 tablet by mouth daily.       POLYETHYLENE GLYCOL 3350 (MIRALAX ORAL) Powder.       hydrocortisone (ANUSOL-HC) 2.5 % rectal cream Apply rectally 2 times daily 30 g 1     zolpidem (AMBIEN) 5 MG tablet Take 1 tablet (5 mg total) by mouth at bedtime as needed for sleep.  30 tablet 0     No current facility-administered medications for this visit.        TOBACCO USE:  Social History     Tobacco Use   Smoking Status Never Smoker   Smokeless Tobacco Never Used       VITALS:  Vitals:    05/29/19 1546   BP: 130/72   Pulse: 80   Weight: 151 lb 11.2 oz (68.8 kg)     Wt Readings from Last 3 Encounters:   05/29/19 151 lb 11.2 oz (68.8 kg)   04/10/19 153 lb 4.8 oz (69.5 kg)   12/12/18 152 lb 14.4 oz (69.4 kg)       PHYSICAL EXAM:  Constitutional:   Reveals a healthy appearing female.  Vitals: per nursing notes.  HEENT:  Ears:  External canals, TMs clear.    Eyes:  EOMs full, PERRL.  Lungs: Clear to A&P without rales or wheezes.  Respiratory effort normal.  Cardiac: Rhythm was regular, early systolic murmur.  Musculoskeletal: No peripheral swelling.  Neuro:  Alert and oriented. Cranial nerves, motor, sensory exams are intact.  No gross focal deficits.  Psychiatric:  Memory intact, mood appropriate.    QUALITY MEASURES:      DATA REVIEWED:  Additional History from Old Records Summarized (2): Reviewed endocrinology note from 5/07/19: Hypothyroidism.  Decision to Obtain Records (1):   Radiology Tests Summarized or Ordered (1): Reviewed US of thyroid from 4/19/19: Diffuse heterogeneity of thyroid parenchyma most suggestive of chronic thyroiditis.   Labs Reviewed or Ordered (1): Ordered labs.  Medicine Test Summarized or Ordered (1): Ordered Echo.   Independent Review of EKG, X-RAY, or RAPID STREP (2 each):     The visit lasted a total of 21 minutes face to face with the patient. Over 50% of the time was spent counseling and educating the patient about her above concerns.    By signing my name below, I, Aureliano Black, attest that this documentation has been prepared under the direction and in the presence of Dr. Jamshid Lala.  Electronic Signature: Rick Benites. 5/29/2019 3:53 PM.    RY, Dr. Lala, personally performed the services described in this documentation. All medical record  entries made by the scribe were at my direction and in my presence. I have reviewed the chart and discharge instructions (if applicable) and agree that the record reflects my personal performance and is accurate and complete.      Total data points: 5

## 2021-05-29 NOTE — TELEPHONE ENCOUNTER
"RN Triage:    Consulted with endocrinology on 5/7/19 for fatigue per Dr. Lala's suggestion.  Endocrinology did not find anything remarkable, and, apparently, did not think her symptoms are due to thyroid.  No further recommendations were made.  Iesha still has \"extreme fatigue\".  Plans to come in next week for evaluation.    Sharyn Galvin, RN   [facility]      "

## 2021-05-30 ENCOUNTER — RECORDS - HEALTHEAST (OUTPATIENT)
Dept: ADMINISTRATIVE | Facility: CLINIC | Age: 76
End: 2021-05-30

## 2021-05-30 VITALS — BODY MASS INDEX: 21.19 KG/M2 | HEIGHT: 70 IN | WEIGHT: 148 LBS

## 2021-05-30 VITALS — BODY MASS INDEX: 22.64 KG/M2 | WEIGHT: 150 LBS

## 2021-05-30 VITALS — WEIGHT: 148 LBS | HEIGHT: 70 IN | BODY MASS INDEX: 21.19 KG/M2

## 2021-05-30 VITALS — WEIGHT: 147 LBS | BODY MASS INDEX: 22.19 KG/M2

## 2021-05-31 VITALS — WEIGHT: 151 LBS | BODY MASS INDEX: 22.88 KG/M2 | HEIGHT: 68 IN

## 2021-05-31 VITALS — BODY MASS INDEX: 21.59 KG/M2 | WEIGHT: 142 LBS

## 2021-06-01 ENCOUNTER — RECORDS - HEALTHEAST (OUTPATIENT)
Dept: ADMINISTRATIVE | Facility: CLINIC | Age: 76
End: 2021-06-01

## 2021-06-01 VITALS — WEIGHT: 154 LBS | BODY MASS INDEX: 23.42 KG/M2

## 2021-06-01 VITALS — BODY MASS INDEX: 23.11 KG/M2 | WEIGHT: 152 LBS

## 2021-06-02 VITALS — BODY MASS INDEX: 23.31 KG/M2 | WEIGHT: 153.3 LBS

## 2021-06-02 VITALS — WEIGHT: 152.9 LBS | BODY MASS INDEX: 23.25 KG/M2

## 2021-06-02 VITALS — WEIGHT: 151.7 LBS | BODY MASS INDEX: 23.07 KG/M2

## 2021-06-03 VITALS
WEIGHT: 155.5 LBS | BODY MASS INDEX: 23.57 KG/M2 | DIASTOLIC BLOOD PRESSURE: 84 MMHG | SYSTOLIC BLOOD PRESSURE: 143 MMHG | HEIGHT: 68 IN

## 2021-06-03 VITALS — WEIGHT: 151 LBS | BODY MASS INDEX: 22.88 KG/M2 | HEIGHT: 68 IN

## 2021-06-04 VITALS
WEIGHT: 153 LBS | SYSTOLIC BLOOD PRESSURE: 124 MMHG | DIASTOLIC BLOOD PRESSURE: 84 MMHG | HEART RATE: 78 BPM | BODY MASS INDEX: 23.26 KG/M2 | OXYGEN SATURATION: 98 %

## 2021-06-04 VITALS
HEART RATE: 80 BPM | BODY MASS INDEX: 23.57 KG/M2 | OXYGEN SATURATION: 98 % | DIASTOLIC BLOOD PRESSURE: 82 MMHG | WEIGHT: 155 LBS | SYSTOLIC BLOOD PRESSURE: 116 MMHG

## 2021-06-04 VITALS
BODY MASS INDEX: 23.19 KG/M2 | WEIGHT: 152.5 LBS | TEMPERATURE: 98 F | SYSTOLIC BLOOD PRESSURE: 120 MMHG | HEART RATE: 68 BPM | DIASTOLIC BLOOD PRESSURE: 70 MMHG

## 2021-06-04 VITALS
DIASTOLIC BLOOD PRESSURE: 76 MMHG | SYSTOLIC BLOOD PRESSURE: 135 MMHG | WEIGHT: 151.7 LBS | BODY MASS INDEX: 23.07 KG/M2 | HEART RATE: 71 BPM

## 2021-06-04 NOTE — PROGRESS NOTES
Assessment and Plan:       1. Wellness examination  Patient overall has very good health habits.    2. Anemia  Probably physiologic.    3. Nonrheumatic aortic valve stenosis  Not severe at this time, and asymptomatic.  - Basic Metabolic Panel    4. Atrial fibrillation (H)  Patient has been in sinus rhythm for some time, metoprolol caused excessive fatigue.    5. Constipation  Longstanding issue.  - Thyroid Stimulating Hormone (TSH)  - Basic Metabolic Panel    6. Endometrial cancer (H)  In remission.    7. Insomnia  Longstanding issue.    8. Low back pain  Has been relatively stable.    9. Osteopenia  From prior DEXA scans, no evidence of any fracture.    10. 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  - Thyroid Stimulating Hormone (TSH)  - Basic Metabolic Panel    11. Need for vaccination     - Pneumococcal polysaccharide vaccine 23-valent 1 yo or older, subq/IM  - Tdap vaccine,  8yo or older,  IM    PLAN:  1.  Pneumonia 23 and Tdap.  2.  In terms of the history of osteopenia, I would not do any further testing at this point rather focus on exercise.  3.  Laboratory studies as above, fasting.  4.  Patient otherwise should be seen yearly for wellness examinations.          The patient's current medical problems were reviewed.      The following health maintenance schedule was reviewed with the patient and provided in printed form in the after visit summary:   Health Maintenance   Topic Date Due     HEPATITIS C SCREENING  1945     PNEUMOCOCCAL IMMUNIZATION 65+ LOW/MEDIUM RISK (2 of 2 - PPSV23) 03/28/2018     DXA SCAN  04/05/2019     TD 18+ HE  05/20/2019     MAMMOGRAM  09/05/2020     MEDICARE ANNUAL WELLNESS VISIT  12/10/2020     FALL RISK ASSESSMENT  12/10/2020     LIPID  03/28/2022     ADVANCE CARE PLANNING  03/28/2022     INFLUENZA VACCINE RULE BASED  Completed     COLONOSCOPY  Discontinued     ZOSTER VACCINES  Discontinued        Subjective:    Chief Complaint: Iesha Patel is an 74 y.o. female here for an Annual Wellness visit.   HPI:  Iesha was at the eye doctor a couple months ago. The eye doctor asked her if she was diabetic and recommended she be checked. He mentioned seeing the beginning of macular degeneration. She has cataracts.     She states that her constipation is the same. She no longer takes the 5 mg of Ambien because it does not help with her insomnia. Iesha notes that worry used to be a major problem for her, but no longer is. Patient mentions her stamina is down but attributes to age. She does walk around her apartment for exercise.     She had a flu shot this year. She states that a few hours after receiving the shot she had a terrible cough and headache but went away after some time.     Denies any history or issues with bladder infections.     Review of Systems:  Please see above.  The rest of the review of systems are negative for all systems.    Patient Care Team:  Jamshid Lala MD as PCP - General (Family Medicine)  Jamshid Lala MD as Assigned PCP     Patient Active Problem List   Diagnosis     Chronic Constipation     Insomnia     Thyroid disease     Low back pain     Endometrial cancer (H)     Anemia     Osteopenia     Aortic stenosis     Atrial fibrillation (H)     Heart murmur     No past medical history on file.   Past Surgical History:   Procedure Laterality Date     BACK SURGERY  2/16/18    spinal stenosis with lumbar claudication and synovial cyst removal. Dr. Lencho Lindsey at [facility]     CHOLECYSTECTOMY  1991    Description: Cholecystectomy;  Recorded: 03/01/2013;  Comments: 1991 by Dr. Tucker     HYSTERECTOMY  2000    Secondary to endometrial cancer.     LUMBAR FUSION  02/2018    L4 through S1     LUMBAR SPINE SURGERY  1985    Right sciatic, lumbar laminectomy.     MOLE REMOVAL  09/13/2011    seborrheic keratosis removed from her right anterior abdominal wall     OOPHORECTOMY Bilateral 2000     TOTAL  HIP ARTHROPLASTY Left 01/05/2011    Dr. Albert at [facility]     TUBAL LIGATION  1980           Family History   Problem Relation Age of Onset     Cancer Brother         testicular, bladder, kidney, skin     Diabetes type II Brother      Prostate cancer Brother      Heart disease Brother 59        heart attack      Valvular heart disease Mother      Diabetes type II Mother      Stroke Father      Diabetes Brother      Cancer Brother      Allergies Brother       Social History     Socioeconomic History     Marital status:      Spouse name: Not on file     Number of children: 3     Years of education: Not on file     Highest education level: Not on file   Occupational History     Occupation:      Employer: RETIRED   Social Needs     Financial resource strain: Not on file     Food insecurity:     Worry: Not on file     Inability: Not on file     Transportation needs:     Medical: Not on file     Non-medical: Not on file   Tobacco Use     Smoking status: Never Smoker     Smokeless tobacco: Never Used   Substance and Sexual Activity     Alcohol use: No     Drug use: Yes     Comment: rare     Sexual activity: Not on file   Lifestyle     Physical activity:     Days per week: Not on file     Minutes per session: Not on file     Stress: Not on file   Relationships     Social connections:     Talks on phone: Not on file     Gets together: Not on file     Attends Episcopal service: Not on file     Active member of club or organization: Not on file     Attends meetings of clubs or organizations: Not on file     Relationship status: Not on file     Intimate partner violence:     Fear of current or ex partner: Not on file     Emotionally abused: Not on file     Physically abused: Not on file     Forced sexual activity: Not on file   Other Topics Concern     Not on file   Social History Narrative    Diet- Regular        Exercise- Now nothing, walking in past        Senior Living         twice   She recently moved in to  "Senior Living.      Current Outpatient Medications   Medication Sig Dispense Refill     aspirin 81 MG EC tablet 81 mg daily.       cycloSPORINE (RESTASIS) 0.05 % ophthalmic emulsion Administer 1 drop to both eyes 2 (two) times a day.       hydrocortisone (ANUSOL-HC) 2.5 % rectal cream Apply rectally 2 times daily 30 g 1     MAGNESIUM HYDROXIDE (FOLEY MILK OF MAGNESIA ORAL) Take 1 capsule by mouth daily.       multivitamin with minerals (THERA-M) 9 mg iron-400 mcg Tab tablet Take 1 tablet by mouth daily.       POLYETHYLENE GLYCOL 3350 (MIRALAX ORAL) Powder.       No current facility-administered medications for this visit.       Objective:   Vital Signs:   Visit Vitals  /84   Ht 5' 8\" (1.727 m)   Wt 155 lb 8 oz (70.5 kg)   BMI 23.64 kg/m         VisionScreening:  No exam data present     PHYSICAL EXAM  Constitutional:   Reveals a female appearing her stated age.  Vitals: per nursing notes.  HEENT:  Ears:  External canals, TMs clear.    Eyes:  EOMs full, PERRL.  Lungs: Clear to A&P without rales or wheezes.  Respiratory effort normal.  Cardiac:   Regular rate and rhythm, normal S1, S2, no murmur or gallop.  Musculoskeletal: No peripheral swelling.  Neuro:  Alert and oriented. Cranial nerves, motor, sensory exams are intact.  No gross focal deficits.  Psychiatric:  Memory intact, mood appropriate.      Assessment Results 12/10/2019   Activities of Daily Living No help needed   Instrumental Activities of Daily Living No help needed   Mini Cog Total Score 4   Some recent data might be hidden     A Mini-Cog score of 0-2 suggests the possibility of dementia, score of 3-5 suggests no dementia    Identified Health Risks:     The patient was counseled and encouraged to consider modifying their diet and eating habits. She was provided with information on recommended healthy diet options.  Information regarding advance directives (living [place]), including where she can download the appropriate form, was provided to the " patient via the AVS.     I, Monica Chao am scribing for and in the presence of, Dr. Lala.    I, Dr. Lala, personally performed the services described in this documentation, as scribed by Monica Chao in my presence, and it is both accurate and complete.

## 2021-06-05 VITALS
SYSTOLIC BLOOD PRESSURE: 138 MMHG | HEIGHT: 68 IN | OXYGEN SATURATION: 98 % | HEART RATE: 66 BPM | DIASTOLIC BLOOD PRESSURE: 78 MMHG | WEIGHT: 154 LBS | BODY MASS INDEX: 23.34 KG/M2

## 2021-06-05 NOTE — PROGRESS NOTES
ASSESSMENT and PLAN:    #1.  Loose stools and nausea, potentially related to viral gastroenteritis.  I reassured her that I did not feel any masses or other abnormalities on her abdominal exam which she was happy with.  I offered her a prescription for Zofran to use for the nausea, and she stated that she may have some at home from an old illness but if she did not she would fill the prescription that I gave her.  Follow-up in 3 to 4 days if not improved with either myself or her primary care physician.    Problem List Items Addressed This Visit     None          There are no Patient Instructions on file for this visit.    There are no discontinued medications.    No follow-ups on file.    CHIEF COMPLAINT:  Chief Complaint   Patient presents with     Nausea     x 3 days - No vomitting or Diarrhea       HISTORY OF PRESENT ILLNESS:  Iesha Patel is a 74 y.o. female  presenting to the clinic today for evaluation of nausea and mild abdominal pain.  She states that this been going on for about the last 2 to 3 days.  She localizes a discomfort just to the right of her umbilicus, and states that she gets a sensation of nausea specifically in the spot and not the epigastrium.  She has been eating and drinking well.  She is states that she has been dealing with some loose stools, but no full-blown diarrhea.  No blood in the stool.  She has not been vomiting.  No fevers.  She also felt that she may have felt a bump or a mass in the right side of her abdomen, thus her visit today.  She is otherwise doing well.    REVIEW OF SYSTEMS:   Pertinent positives noted in HPI, remainder of ROS is negative.    MEDICATIONS:  Current Outpatient Medications   Medication Sig Dispense Refill     aspirin 81 MG EC tablet 81 mg daily.       cycloSPORINE (RESTASIS) 0.05 % ophthalmic emulsion Administer 1 drop to both eyes 2 (two) times a day.       hydrocortisone (ANUSOL-HC) 2.5 % rectal cream Apply rectally 2 times daily 30 g 1     MAGNESIUM  HYDROXIDE (FOLEY MILK OF MAGNESIA ORAL) Take 1 capsule by mouth daily.       multivitamin with minerals (THERA-M) 9 mg iron-400 mcg Tab tablet Take 1 tablet by mouth daily.       POLYETHYLENE GLYCOL 3350 (MIRALAX ORAL) Powder.       traZODone (DESYREL) 50 MG tablet Take one to two at night as needed for sleep 60 tablet 2     No current facility-administered medications for this visit.        TOBACCO USE:  Social History     Tobacco Use   Smoking Status Never Smoker   Smokeless Tobacco Never Used       VITALS:  Vitals:    01/10/20 1357   BP: 120/70   Patient Site: Right Arm   Patient Position: Sitting   Cuff Size: Adult Large   Pulse: 68   Temp: 98  F (36.7  C)   TempSrc: Oral   Weight: 152 lb 8 oz (69.2 kg)     Wt Readings from Last 3 Encounters:   01/10/20 152 lb 8 oz (69.2 kg)   12/10/19 155 lb 8 oz (70.5 kg)   06/18/19 151 lb (68.5 kg)         PHYSICAL EXAM:  Constitutional:  Reveals an alert, pleasant female.   HEET: Normocephalic, without obvious abnormality, atraumatic.   Neurologic: Normal gait and station  Psychologic: Normal affect  Abdomen is soft, nontender, nondistended.  In the area in question (to the right of the umbilicus).  I feel no masses or other abnormalities.  It is not tender to palpation in this area.

## 2021-06-05 NOTE — TELEPHONE ENCOUNTER
RN triage   Call from pt   Pt states she has had some pain/tenderness - abd -- just R of naval -   Feels like a lump - dunia w/ standing or stretching -- not tender to touch or pressure   Has has nausea for several days -- no vomiting -- some looser stools x3/day -- no diarrhea  -- no blood in stool  U.O. OK   Drinking fluids OK --   Not eating much due to nausea   Sleeping well  No fever   Reviewed home care advice   Per protocol = should be seen   Transferred to        Reason for Disposition    [1] MILD pain (e.g., does not interfere with normal activities) AND [2] pain comes and goes (cramps) AND [3] present > 48 hours    Protocols used: ABDOMINAL PAIN - FEMALE-A-AH

## 2021-06-06 NOTE — PATIENT INSTRUCTIONS - HE
I will comment on laboratory studies and the CT scan certainly will call you if anything is significantly abnormal and will proceed accordingly.

## 2021-06-06 NOTE — PROGRESS NOTES
ASSESSMENT:  1. Insomnia  Patient has benefited from trazodone.    2. Constipation  Longstanding issue though generally well managed with MiraLAX and milk of magnesia.  - Comprehensive Metabolic Panel  - HM2(CBC w/o Differential)  - C-Reactive Protein  - Celiac(Gluten)Antibody Panel  - CT Abdomen Pelvis With Oral With IV Contrast; Future  - Erythrocyte Sedimentation Rate    3. Abdominal fullness  Patient with a longstanding history of upper abdominal fullness though this has worsened more recently.  - Comprehensive Metabolic Panel  - HM2(CBC w/o Differential)  - C-Reactive Protein  - Celiac(Gluten)Antibody Panel  - CT Abdomen Pelvis With Oral With IV Contrast; Future  - Erythrocyte Sedimentation Rate    4. Nausea  Patient with essentially daily nausea, unclear or unknown trigger.  - Comprehensive Metabolic Panel  - HM2(CBC w/o Differential)  - C-Reactive Protein  - Celiac(Gluten)Antibody Panel  - CT Abdomen Pelvis With Oral With IV Contrast; Future  - Erythrocyte Sedimentation Rate        PLAN:  1.  The patient has had a fairly extensive GI work-up in the past, negative has been told that she has irritable bowel syndrome.  2.  I emphasized to the patient that I cannot give her that diagnosis definitively, without further work-up, though it certainly a possibility.  3.  Laboratory studies as above.  4.  CT scan of the abdomen pelvis.  5.  Receipt accordingly, but if the results are essentially normal or equivocal, would then refer to gastroenterology.    Orders Placed This Encounter   Procedures     CT Abdomen Pelvis With Oral With IV Contrast     Standing Status:   Future     Standing Expiration Date:   3/17/2021     Order Specific Question:   Reason for Exam (Describe Symptoms):     Answer:   upper abd fullness, constipation, nausea, longstanding     Order Specific Question:   Can the procedure be changed per Radiologist protocol?     Answer:   Yes     Order Specific Question:   If this is a diagnostic procedure,  "have the patient's age and recent imaging history been considered?     Answer:   Yes     Comprehensive Metabolic Panel     HM2(CBC w/o Differential)     C-Reactive Protein     Celiac(Gluten)Antibody Panel     Erythrocyte Sedimentation Rate     There are no discontinued medications.    No follow-ups on file.    CHIEF COMPLAINT:  Chief Complaint   Patient presents with     GI Problem     off and on for \"several\" months. Pt thinks she has \"stomach flu\" twice this year. Pt has a lot of discomfort in upper abdomen area        SUBJECTIVE:  Iesha is a 74 y.o. female who comes in with a longstanding history of GI issues.  Patient had a cholecystectomy years ago after that she had ongoing constipation which she generally manages well with a combination of milk of magnesia and MiraLAX.    The past several months, she has had a worsening of some longstanding symptoms, mainly some upper abdominal fullness.  Patient reports a fullness is in the upper part of the abdomen only, she would not describe it as pain or bloating but a sensation of fullness.  It does not seem to matter what she does or does not eat or does or does not drink.  She is very clear that she does not have symptoms of heartburn.    She is also having more last chronic daily sensation of nausea.  It is clearly worse if she eats anything after 6 PM though she reports that she still having nausea symptoms pretty much daily.  She wonders if her medications could be playing a role, while it is possible there does not seem to be an association between her taking pills and the nausea.    The patient has a longstanding history of GI issues, back in 2008 or so she had a 50 pound weight loss, she underwent extensive testing only to find that there was no obvious cause and incidentally she has maintained her current weight.    Otherwise there is been no other change in her health status.      REVIEW OF SYSTEMS:      All other systems are negative.    PFSH:  Immunization " History   Administered Date(s) Administered     DT (pediatric) 02/07/2006     Hep A, Adult IM (19yr & older) 08/26/2010, 08/11/2011     Hep A, historic 08/26/2010, 08/11/2011     Influenza high dose,seasonal,PF, 65+ yrs 10/17/2017, 09/19/2018     Pneumo Conj 13-V (2010&after) 03/28/2017     Pneumo Polysac 23-V 12/10/2019     Td, Adult, Absorbed 05/20/2009     Td,adult,historic,unspecified 05/20/2009     Tdap 12/10/2019     Typhoid, Inj, Inactive 12/12/2018     Social History     Socioeconomic History     Marital status:      Spouse name: Not on file     Number of children: 3     Years of education: Not on file     Highest education level: Not on file   Occupational History     Occupation:      Employer: RETIRED   Social Needs     Financial resource strain: Not on file     Food insecurity     Worry: Not on file     Inability: Not on file     Transportation needs     Medical: Not on file     Non-medical: Not on file   Tobacco Use     Smoking status: Never Smoker     Smokeless tobacco: Never Used   Substance and Sexual Activity     Alcohol use: No     Drug use: Yes     Comment: rare     Sexual activity: Not on file   Lifestyle     Physical activity     Days per week: Not on file     Minutes per session: Not on file     Stress: Not on file   Relationships     Social connections     Talks on phone: Not on file     Gets together: Not on file     Attends Confucianism service: Not on file     Active member of club or organization: Not on file     Attends meetings of clubs or organizations: Not on file     Relationship status: Not on file     Intimate partner violence     Fear of current or ex partner: Not on file     Emotionally abused: Not on file     Physically abused: Not on file     Forced sexual activity: Not on file   Other Topics Concern     Not on file   Social History Narrative    Diet- Regular        Exercise- Now nothing, walking in past        Senior Living         twice     No past medical  history on file.  Family History   Problem Relation Age of Onset     Cancer Brother         testicular, bladder, kidney, skin     Diabetes type II Brother      Prostate cancer Brother      Heart disease Brother 59        heart attack      Valvular heart disease Mother      Diabetes type II Mother      Stroke Father      Alcoholism Daughter      Diabetes Brother      Cancer Brother      Allergies Brother        MEDICATIONS:  Current Outpatient Medications   Medication Sig Dispense Refill     aspirin 81 MG EC tablet 81 mg daily.       cycloSPORINE (RESTASIS) 0.05 % ophthalmic emulsion Administer 1 drop to both eyes 2 (two) times a day.       hydrocortisone (ANUSOL-HC) 2.5 % rectal cream Apply rectally 2 times daily 30 g 1     MAGNESIUM HYDROXIDE (FOLEY MILK OF MAGNESIA ORAL) Take 1 capsule by mouth daily.       multivitamin with minerals (THERA-M) 9 mg iron-400 mcg Tab tablet Take 1 tablet by mouth daily.       POLYETHYLENE GLYCOL 3350 (MIRALAX ORAL) Powder.       traZODone (DESYREL) 50 MG tablet Take one to two at night as needed for sleep 60 tablet 2     No current facility-administered medications for this visit.        TOBACCO USE:  Social History     Tobacco Use   Smoking Status Never Smoker   Smokeless Tobacco Never Used       VITALS:  Vitals:    03/17/20 1123   BP: 135/76   Pulse: 71   Weight: 151 lb 11.2 oz (68.8 kg)     Wt Readings from Last 3 Encounters:   03/17/20 151 lb 11.2 oz (68.8 kg)   01/10/20 152 lb 8 oz (69.2 kg)   12/10/19 155 lb 8 oz (70.5 kg)       PHYSICAL EXAM:  Constitutional:   Reveals a female who overall looks healthy.  Vitals: per nursing notes.  HEENT:  Ears:  External canals, TMs clear.    Eyes:  EOMs full, PERRL.  Lungs: Clear to A&P without rales or wheezes.  Respiratory effort normal.  Cardiac:   Regular rate and rhythm, normal S1, S2, no murmur or gallop.  Musculoskeletal: No peripheral swelling.  Neuro:  Alert and oriented. Cranial nerves, motor, sensory exams are intact.  No  gross focal deficits.  Psychiatric:  Memory intact, mood appropriate.  Abdomen.  Very mild tenderness upper part of the abdomen slightly in the right upper quadrant, no masses palpated no guarding or rebound.    QUALITY MEASURES:      DATA REVIEWED:  She was seen January 10 of this year for nausea diagnosed essentially with a viral illness.

## 2021-06-08 NOTE — PROGRESS NOTES
ASSESSMENT:  1. Cough  Preliminary interpretation is probable right middle lobe pneumonia, patient has clinical symptoms certainly suggestive of a pneumonic process.  - XR Chest PA and Lateral      PLAN:  1.  Zithromax in the usual 5 day course  2.  Cheratussin with codeine cough syrup.      Orders Placed This Encounter   Procedures     XR Chest PA and Lateral     Order Specific Question:   Can the procedure be changed per Radiologist protocol?     Answer:   Yes     There are no discontinued medications.    No Follow-up on file.    CHIEF COMPLAINT:  Chief Complaint   Patient presents with     Cough     congestion,fatigue  flu x 12 days - dx at  room 10 days ago - SOB/wheeze with cough, chills  - no fever        SUBJECTIVE:  Iesha is a 71 y.o. female who presents to the clinic today with a cough.    Cough: She has been experiencing a cough, with congestion and fatigue with shortness of breath, for the past 12 days. She has also been experiencing nausea. She was seen at the [facility] on 1/29/2017 and diagnosed with influenza A after a positive rapid influenza test. Since, then she has continued to experience shortness of breath, wheezing, chills, and a continued cough. She denies any known fever, although she did note a fever prior to her first visit to the [facility].    She states that her nausea and cough have been bothering her the most now. She has been feeling tired. She has been taking Zofran for the nausea and recently switched to Compazine. This has been helpful. She has been able to sleep well recently, although she will get up at night to cough. She fell asleep six times yesterday during the day.    REVIEW OF SYSTEMS:   She continues to experience left-sided flank pain. All other systems are negative.    PFSH:  Immunization History   Administered Date(s) Administered     DT (pediatric) 02/07/2006     Hep A, historic 08/26/2010, 08/11/2011     Td, historic 05/20/2009     Social History     Social  History     Marital status:      Spouse name: N/A     Number of children: N/A     Years of education: N/A     Occupational History     Not on file.     Social History Main Topics     Smoking status: Never Smoker     Smokeless tobacco: Never Used     Alcohol use Not on file     Drug use: Not on file     Sexual activity: Not on file     Other Topics Concern     Not on file     Social History Narrative     Past Medical History:   Diagnosis Date     Endometrial cancer      Family History   Problem Relation Age of Onset     Cancer Brother      testicular, bladder, kidney, skin       MEDICATIONS:  Current Outpatient Prescriptions   Medication Sig Dispense Refill     aspirin 81 MG EC tablet 81 mg daily.       levothyroxine (SYNTHROID, LEVOTHROID) 75 MCG tablet TAKE ONE TABLET BY MOUTH ONCE DAILY 90 tablet 1     MAGNESIUM HYDROXIDE (FOLEY MILK OF MAGNESIA ORAL) Take 1 capsule by mouth daily.       CHADWICK FLAVOR, BULK, MISC Use As Directed. Tablet, x2 daily       melatonin 1 mg Tab tablet Take 1 mg by mouth. 2 times daily       MULTIVITAMIN ORAL 1 tablet daily.       ondansetron (ZOFRAN-ODT) 4 MG disintegrating tablet        POLYETHYLENE GLYCOL 3350 (MIRALAX ORAL) Powder.       prochlorperazine (COMPAZINE) 10 MG tablet        azithromycin (ZITHROMAX Z-ELIAN) 250 MG tablet Take 2 tablets (500 mg) on  Day 1,  followed by 1 tablet (250 mg) once daily on Days 2 through 5. 6 tablet 0     codeine-guaiFENesin (GUAIFENESIN AC)  mg/5 mL liquid Take 5-10 mL by mouth every 6 (six) hours as needed for cough. 240 mL 0     No current facility-administered medications for this visit.        TOBACCO USE:  History   Smoking Status     Never Smoker   Smokeless Tobacco     Never Used       VITALS:  Vitals:    02/08/17 1132   BP: 140/60   Temp: 98.6  F (37  C)   TempSrc: Oral   Weight: 150 lb (68 kg)     Wt Readings from Last 3 Encounters:   02/08/17 150 lb (68 kg)   12/16/15 145 lb 4.8 oz (65.9 kg)   07/10/15 143 lb 3.2 oz (65 kg)        PHYSICAL EXAM:  Constitutional:   Reveals an alert female that appears stated age.  Vitals: per nursing notes.  HEENT:  Ears:  External canals, TMs clear.    Eyes:  EOMs full, PERRL.  Lungs: Clear to A&P without rales or wheezes.  Respiratory effort normal.  Cardiac:   Regular rate and rhythm, normal S1, S2, no murmur or gallop.  Musculoskeletal: No peripheral swelling.  Neuro:  Alert and oriented. Cranial nerves, motor, sensory exams are intact.  No gross focal deficits.  Psychiatric:  Memory intact, mood appropriate.    Chest x-ray: Preliminary interpretation is possible infiltrate, right middle lobe.    DATA REVIEWED:  Additional History from Old Records Summarized (2): Reviewed 2/5/2017 note from the [facility] regarding nausea.  Decision to Obtain Records (1): None  Radiology Tests Summarized or Ordered (1): Ordered chest x-ray. Reviewed 2/5/2017 CT abdomen pelvis note.  Labs Reviewed or Ordered (1): None  Medicine Test Summarized or Ordered (1): None  Independent Review of EKG, X-RAY, or RAPID STREP (2 each): Interpreted chest x-ray.    The visit lasted a total of 7 minutes face to face with the patient. Over 50% of the time was spent counseling and educating the patient about a cough.    IPradeep, am scribing for and in the presence of, Dr. Lala.    I, Dr. Lala, personally performed the services described in this documentation, as scribed by Pradeep Lock in my presence, and it is both accurate and complete.    Total Data Points: 5

## 2021-06-08 NOTE — PROGRESS NOTES
ASSESSMENT:  1. Cough  Patient still has ongoing pneumonia by both us x-ray as well as clinical symptoms.  Just completed a five-day course of Zithromax.  - XR Chest PA and Lateral  - BNP(B-type Natriuretic Peptide)    2. Fatigue  Most likely secondary to ongoing pneumonia certainly other causes possible.  - XR Chest PA and Lateral  - HM1(CBC and Differential)  - Thyroid Stimulating Hormone (TSH)  - Comprehensive Metabolic Panel  - BNP(B-type Natriuretic Peptide)  - HM1 (CBC with Diff)      PLAN:  1.  Levaquin 500 mg daily ×7 days.  2.  Laboratory studies as above to exclude other causes.  3.  Explained to the patient that she probably will need a full month for recovery.  4.  At some point in the next several months or so we should repeat the chest x-ray, certainly see earlier if not improving.    Orders Placed This Encounter   Procedures     XR Chest PA and Lateral     Order Specific Question:   Can the procedure be changed per Radiologist protocol?     Answer:   Yes     Thyroid Stimulating Hormone (TSH)     Comprehensive Metabolic Panel     BNP(B-type Natriuretic Peptide)     HM1 (CBC with Diff)     Medications Discontinued During This Encounter   Medication Reason     azithromycin (ZITHROMAX Z-ELIAN) 250 MG tablet Therapy completed       No Follow-up on file.    CHIEF COMPLAINT:  Chief Complaint   Patient presents with     Fatigue     was seen last wed- not feeling better, feeling weak, nausea- feels heavy breathing  and extreme fatigue - feels SOB        SUBJECTIVE:  Iesha is a 71 y.o. female who presents to the clinic today with fatigue. She is here today with her daughter.    Fatigue: She was seen on 2/8/2017 and diagnosed with pneumonia via chest x-ray. She was given azithromycin and Cheratussin with codeine at that visit. She states that she felt better for a few days, but became very tired on Saturday night. She has been feeling tired the last three weeks and out of breath. She states that she has slept for  12 hours and will continue to feel tired afterward. She finds that it takes effort to breath. She continues to cough, but less so than she had been previously. She has not been using the Cheratussin with codeine. She states that the codeine causes her to have an upset stomach, and that medications tend to cause an upset stomach in general.    REVIEW OF SYSTEMS:   She denies head congestion. She experienced chills last night. She has been experiencing less nausea. She has had a decrease in her appetite and has been making herself eat. All other systems are negative.    PFSH:  Immunization History   Administered Date(s) Administered     DT (pediatric) 02/07/2006     Hep A, historic 08/26/2010, 08/11/2011     Td, historic 05/20/2009     Social History     Social History     Marital status:      Spouse name: N/A     Number of children: N/A     Years of education: N/A     Occupational History     Not on file.     Social History Main Topics     Smoking status: Never Smoker     Smokeless tobacco: Never Used     Alcohol use Not on file     Drug use: Not on file     Sexual activity: Not on file     Other Topics Concern     Not on file     Social History Narrative     Past Medical History:   Diagnosis Date     Endometrial cancer      Family History   Problem Relation Age of Onset     Cancer Brother      testicular, bladder, kidney, skin       MEDICATIONS:  Current Outpatient Prescriptions   Medication Sig Dispense Refill     aspirin 81 MG EC tablet 81 mg daily.       codeine-guaiFENesin (GUAIFENESIN AC)  mg/5 mL liquid Take 5-10 mL by mouth every 6 (six) hours as needed for cough. 240 mL 0     levothyroxine (SYNTHROID, LEVOTHROID) 75 MCG tablet TAKE ONE TABLET BY MOUTH ONCE DAILY 90 tablet 1     MAGNESIUM HYDROXIDE (FOLEY MILK OF MAGNESIA ORAL) Take 1 capsule by mouth daily.       CHADWICK FLAVOR, BULK, MISC Use As Directed. Tablet, x2 daily       melatonin 1 mg Tab tablet Take 1 mg by mouth. 2 times daily        MULTIVITAMIN ORAL 1 tablet daily.       ondansetron (ZOFRAN-ODT) 4 MG disintegrating tablet        POLYETHYLENE GLYCOL 3350 (MIRALAX ORAL) Powder.       prochlorperazine (COMPAZINE) 10 MG tablet        levoFLOXacin (LEVAQUIN) 500 MG tablet Take 1 tablet (500 mg total) by mouth daily for 7 days. 7 tablet 0     No current facility-administered medications for this visit.        TOBACCO USE:  History   Smoking Status     Never Smoker   Smokeless Tobacco     Never Used       VITALS:  Vitals:    02/13/17 0930   BP: 138/68   Pulse: 84   Temp: 97.7  F (36.5  C)   TempSrc: Oral   SpO2: 97%   Weight: 147 lb (66.7 kg)     Wt Readings from Last 3 Encounters:   02/13/17 147 lb (66.7 kg)   02/08/17 150 lb (68 kg)   12/16/15 145 lb 4.8 oz (65.9 kg)       PHYSICAL EXAM:  Constitutional:   Reveals an alert female that appears stated age. Patient looks tired.  Vitals: per nursing notes.  HEENT:  Ears:  External canals, TMs clear.    Eyes:  EOMs full, PERRL.  Lungs: Clear to A&P without rales or wheezes.  Respiratory effort normal.  Cardiac:   Regular rate and rhythm, normal S1, S2, no murmur or gallop.  Musculoskeletal: No peripheral swelling.  Neuro:  Alert and oriented. Cranial nerves, motor, sensory exams are intact.  No gross focal deficits.  Psychiatric:  Memory intact, mood appropriate.    Chest x-ray: Preliminary interpretation is presence of on-going pneumonia.    DATA REVIEWED:  Additional History from Old Records Summarized (2): None  Decision to Obtain Records (1): None  Radiology Tests Summarized or Ordered (1): Reviewed 2/8/2017 chest x-ray note. Ordered chest x-ray.  Labs Reviewed or Ordered (1): Ordered labs.  Medicine Test Summarized or Ordered (1): None  Independent Review of EKG, X-RAY, or RAPID STREP (2 each): Interpreted chest x-ray.    The visit lasted a total of 12 minutes face to face with the patient. Over 50% of the time was spent counseling and educating the patient about fatigue.    Pradeep RAZA, am  scribing for and in the presence of, Dr. Lala.    I, Dr. Lala, personally performed the services described in this documentation, as scribed by Pradeep Lock in my presence, and it is both accurate and complete.    Total Data Points: 4

## 2021-06-08 NOTE — TELEPHONE ENCOUNTER
Refill Approved    Rx renewed per Medication Renewal Policy. Medication was last renewed on 12/17/19.    Jeniffer Reid, [facility] Triage/Med Refill 5/13/2020     Requested Prescriptions   Pending Prescriptions Disp Refills     traZODone (DESYREL) 50 MG tablet [Pharmacy Med Name: traZODone HCl Oral Tablet 50 MG] 60 tablet 1     Sig: TAKE ONE OR TWO TABLETS BY MOUTH AT BEDTIME IF NEEDED FOR SLEEP       Tricyclics/Misc Antidepressant/Antianxiety Meds Refill Protocol Passed - 5/12/2020  3:22 PM        Passed - PCP or prescribing provider visit in last year     Last office visit with prescriber/PCP: 3/17/2020 Jamshid Lala MD OR same dept: 3/17/2020 Jamshid Lala MD OR same specialty: 3/17/2020 Jamshid Lala MD  Last physical: 12/10/2019 Last MTM visit: Visit date not found   Next visit within 3 mo: Visit date not found  Next physical within 3 mo: Visit date not found  Prescriber OR PCP: Jamshid Lala MD  Last diagnosis associated with med order: 1. Insomnia, unspecified type  - traZODone (DESYREL) 50 MG tablet [Pharmacy Med Name: traZODone HCl Oral Tablet 50 MG]; TAKE ONE OR TWO TABLETS BY MOUTH AT BEDTIME IF NEEDED FOR SLEEP  Dispense: 60 tablet; Refill: 1    If protocol passes may refill for 12 months if within 3 months of last provider visit (or a total of 15 months).

## 2021-06-09 ENCOUNTER — HOSPITAL ENCOUNTER (OUTPATIENT)
Dept: CARDIOLOGY | Facility: CLINIC | Age: 76
Discharge: HOME | End: 2021-06-09
Attending: INTERNAL MEDICINE
Payer: COMMERCIAL

## 2021-06-09 DIAGNOSIS — I35.0 AORTIC STENOSIS: ICD-10-CM

## 2021-06-09 LAB
CV STRESS CURRENT BP HE: NORMAL
CV STRESS CURRENT HR HE: 105
CV STRESS CURRENT HR HE: 107
CV STRESS CURRENT HR HE: 111
CV STRESS CURRENT HR HE: 111
CV STRESS CURRENT HR HE: 119
CV STRESS CURRENT HR HE: 125
CV STRESS CURRENT HR HE: 125
CV STRESS CURRENT HR HE: 128
CV STRESS CURRENT HR HE: 128
CV STRESS CURRENT HR HE: 130
CV STRESS CURRENT HR HE: 135
CV STRESS CURRENT HR HE: 136
CV STRESS CURRENT HR HE: 137
CV STRESS CURRENT HR HE: 78
CV STRESS CURRENT HR HE: 79
CV STRESS CURRENT HR HE: 80
CV STRESS CURRENT HR HE: 80
CV STRESS CURRENT HR HE: 82
CV STRESS CURRENT HR HE: 82
CV STRESS CURRENT HR HE: 83
CV STRESS CURRENT HR HE: 83
CV STRESS CURRENT HR HE: 86
CV STRESS CURRENT HR HE: 90
CV STRESS CURRENT HR HE: 90
CV STRESS CURRENT HR HE: 91
CV STRESS DEVIATION TIME HE: NORMAL
CV STRESS ECHO PERCENT HR HE: NORMAL
CV STRESS EXERCISE STAGE HE: NORMAL
CV STRESS FINAL RESTING BP HE: NORMAL
CV STRESS FINAL RESTING HR HE: 86
CV STRESS MAX HR HE: 137
CV STRESS MAX TREADMILL GRADE HE: 14
CV STRESS MAX TREADMILL SPEED HE: 3.4
CV STRESS PEAK DIA BP HE: NORMAL
CV STRESS PEAK SYS BP HE: NORMAL
CV STRESS PHASE HE: NORMAL
CV STRESS PROTOCOL HE: NORMAL
CV STRESS RESTING PT POSITION HE: NORMAL
CV STRESS RESTING PT POSITION HE: NORMAL
CV STRESS ST DEVIATION AMOUNT HE: NORMAL
CV STRESS ST DEVIATION ELEVATION HE: NORMAL
CV STRESS ST EVELATION AMOUNT HE: NORMAL
CV STRESS TEST TYPE HE: NORMAL
CV STRESS TOTAL STAGE TIME MIN 1 HE: NORMAL
ECHO EJECTION FRACTION ESTIMATED: 60 %
RATE PRESSURE PRODUCT: NORMAL
STRESS ECHO BASELINE DIASTOLIC HE: 84
STRESS ECHO BASELINE HR: 83
STRESS ECHO BASELINE SYSTOLIC BP: 134
STRESS ECHO CALCULATED PERCENT HR: 93 %
STRESS ECHO LAST STRESS DIASTOLIC BP: 74
STRESS ECHO LAST STRESS HR: 135
STRESS ECHO LAST STRESS SYSTOLIC BP: 164
STRESS ECHO POST ESTIMATED WORKLOAD: 8.1
STRESS ECHO POST EXERCISE DUR MIN: 6
STRESS ECHO POST EXERCISE DUR SEC: 45
STRESS ECHO TARGET HR: 147

## 2021-06-09 NOTE — PROGRESS NOTES
ASSESSMENT:  1. Physical exam  Patient generally has overall good health habits though hasn't been as physically active.  - Ambulatory referral for Colonoscopy  - DXA Bone Density Scan; Future    2. Constipation  Chronic long-standing, MiraLAX and milk of magnesia.    3. Hypothyroidism  Thyroid replacement.    4. Insomnia  Some trouble with sleep onset.    5. Low back pain  Prior lumbar laminectomy, more recent back pain now with left sciatic symptoms.    6. Endometrial cancer  Status post hysterectomy in the distant past but no evidence of any recurrence.    7. Anemia  Apparently a long-standing issue.  - Comprehensive Metabolic Panel  - HM1(CBC and Differential)  - Vitamin B12  - Ferritin  - HM1 (CBC with Diff)    8. Vertigo  Recent episode acute onset vertigo, patient had symptoms possibly secondary to an inner ear problem though there could be other causes as well.    9. Fatigue  Patient with recent pneumonia however she reports that she has in general less exercise tolerance and more fatigue.  - Comprehensive Metabolic Panel  - Echo Complete; Future  - Holter Monitor; Future    10. Heart murmur  Soft systolic murmur noted on today's exam.  - Lipid Cascade  - Echo Complete; Future  - Holter Monitor; Future      PLAN:  1.  Pneumonia 13 vaccine.  2.  DEXA scan  3.  Referral for colonoscopy.  4.  Cardiac echocardiogram and Holter monitor, both to look into the etiology of the heart murmur and possibly to uncover an etiology for fatigue.  5.  Laboratory studies as above including anemia labs.  6.  Proceed according to the above results, if fatigue continues may need to look at other etiologies.  Patient will be low risk for sleep apnea but certainly possible.  7.  Patient otherwise should be seen yearly.    Orders Placed This Encounter   Procedures     DXA Bone Density Scan     Standing Status:   Future     Standing Expiration Date:   3/28/2018     Order Specific Question:   Can the procedure be changed per  Radiologist protocol?     Answer:   Yes     Pneumococcal conjugate vaccine 13-valent 6wks-17yrs; >50yrs     Lipid Cascade     Order Specific Question:   Fasting is required?     Answer:   Yes     Comprehensive Metabolic Panel     Vitamin B12     Ferritin     HM1 (CBC with Diff)     Ambulatory referral for Colonoscopy     Referral Priority:   Routine     Referral Type:   Colonoscopy     Referral Reason:   Evaluation and Treatment     Requested Specialty:   Gastroenterology     Number of Visits Requested:   1     Echo Complete     Also with more fatigue     Standing Status:   Future     Standing Expiration Date:   6/26/2017     Order Specific Question:   Reason for exam?     Answer:   Cardiac murmur     Medications Discontinued During This Encounter   Medication Reason     ondansetron (ZOFRAN-ODT) 4 MG disintegrating tablet Therapy completed     prochlorperazine (COMPAZINE) 10 MG tablet Therapy completed       No Follow-up on file.    CHIEF COMPLAINT:  Chief Complaint   Patient presents with     Annual Exam      EST CARE-pt is fasting NEEDS: colonoscopy,dexa,zoster,pcv13 concerns: talk about moles on her back, dizzy spells lately, meds for thyroid , [place] follow-up with chest xray        SUBJECTIVE:  Iesha is a 71 y.o. female who presents to the clinic today for an annual exam. She would also like to establish care and is a former patient of Dr. Qiu, as well as Dr. Obregon.    Hypothyroidism: She is currently using levothyroxine. She has been experiencing more tiredness recently and is concerned that this is related to her thyroid issues.    Insomnia: She has difficulty falling asleep some nights. It will take her 2 to 3 hours to fall asleep at times, but she is okay other nights. She does get 8 hours of sleep at night some nights. She notes that she naps daily.    Constipation: She has had more constipation issues following her gallbladder removal, for which she will take daily Miralax.    Back Pain: She states that  she has pain in her left leg from degenerative disks in her lower back. She was seen by [facility] for this and had an MRI. The pain has caused her to become less active and she has stopped walking regularly. She has had back pain in the past, as well as prior back surgery, with pain in her right leg. After seeing [facility] in November, she did physical therapy and has continued to do exercises. She is not currently experiencing pain in her left leg after resting for the past several weeks with pneumonia.    Dizziness: She has experienced several incidents of dizziness after standing quickly after bending over. This happened twice two days ago, along with rapid heart rate and a feeling that she was going to fall.    Health Maintenance: She states that she has had a DXA scan in the past. She denies any history of fractures. She is agreeable to a DXA scan. She is agreeable to the pneumonia vaccine. She is agreeable to a referral for colonoscopy.    REVIEW OF SYSTEMS:   She has been taking daily low dose aspirin. She is feeling better following her pneumonia diagnosis several weeks ago, although she continues to experience a cough and phlegm. She has had endometrial cancer in 2000 and had a hysterectomy. She states that she has been borderline anemic for many years. She states that she has several moles on her back. She has had some moles removed in the past. She does not often get sick. All other systems are negative.    PFSH:  Immunization History   Administered Date(s) Administered     DT (pediatric) 02/07/2006     Hep A, historic 08/26/2010, 08/11/2011     Pneumo Conj 13-V (2010&after) 03/28/2017     Td, historic 05/20/2009     Social History     Social History     Marital status:      Spouse name: N/A     Number of children: N/A     Years of education: N/A     Occupational History      Retired     Social History Main Topics     Smoking status: Never Smoker     Smokeless tobacco: Never Used     Alcohol use No      "Drug use: Not on file     Sexual activity: Not on file     Other Topics Concern     Not on file     Social History Narrative    Diet- Regular        Exercise- Now nothing, walking in past     Past Medical History:   Diagnosis Date     Endometrial cancer 2000     Family History   Problem Relation Age of Onset     Cancer Brother      testicular, bladder, kidney, skin     Diabetes Brother      Diabetes Mother      Valvular heart disease Mother      Stroke Father        MEDICATIONS:  Current Outpatient Prescriptions   Medication Sig Dispense Refill     aspirin 81 MG EC tablet 81 mg daily.       levothyroxine (SYNTHROID, LEVOTHROID) 75 MCG tablet TAKE ONE TABLET BY MOUTH ONCE DAILY 90 tablet 1     MAGNESIUM HYDROXIDE (FOLEY MILK OF MAGNESIA ORAL) Take 1 capsule by mouth daily.       CHADWICK FLAVOR, BULK, MISC Use As Directed. Tablet, x2 daily       melatonin 1 mg Tab tablet Take 1 mg by mouth. 2 times daily       MULTIVITAMIN ORAL 1 tablet daily.       POLYETHYLENE GLYCOL 3350 (MIRALAX ORAL) Powder.       No current facility-administered medications for this visit.        TOBACCO USE:  History   Smoking Status     Never Smoker   Smokeless Tobacco     Never Used       VITALS:  Vitals:    03/28/17 0911   BP: 122/82   Pulse: 80   Weight: 148 lb (67.1 kg)   Height: 5' 9.5\" (1.765 m)     Wt Readings from Last 3 Encounters:   03/28/17 148 lb (67.1 kg)   02/13/17 147 lb (66.7 kg)   02/08/17 150 lb (68 kg)       PHYSICAL EXAM:  Constitutional:   Reveals an alert female that appears stated age.  Vitals: per nursing notes.  HEENT: Right Ear: External ear normal.   Left Ear: External ear normal.   Nose: Nose normal.   Mouth/Throat: Oropharynx is clear and moist.   Eyes: Conjunctivae and EOM are normal. Pupils are equal, round, and reactive to light. Right eye exhibits no discharge. Left eye exhibits no discharge.   Neck:  Supple, no carotid bruits or adenopathy.  Back:  No spine or CVA pain.  Thorax:  No bony deformities.  Lungs: " Clear to A&P without rales or wheezes.  Respiratory effort normal.  Cardiac:   Soft systolic murmur.  Abdomen:  Soft, active bowel sounds without bruits, mass, or tenderness.  Extremities:   No peripheral edema, pulses in the feet intact.    Skin:  A number of seborrheic keratoses on back.  Neuro:  Alert and oriented. Cranial nerves, motor, sensory exams are intact.  No gross focal deficits.  Psychiatric:  Memory intact, mood appropriate.    DATA REVIEWED:  Additional History from Old Records Summarized (2): Reviewed 11/8/2016 note from [facility] regarding back pain.  Decision to Obtain Records (1): None  Radiology Tests Summarized or Ordered (1): Reviewed 2/13/2017 chest x-ray note. Ordered DXA.  Labs Reviewed or Ordered (1): Ordered labs. Reviewed 2/13/2017 labs.  Medicine Test Summarized or Ordered (1): Ordered Holter monitor. Ordered echocardiogram.  Independent Review of EKG, X-RAY, or RAPID STREP (2 each): None    The visit lasted a total of 27 minutes face to face with the patient. Over 50% of the time was spent counseling and educating the patient about health maintenance.    IPradeep, am scribing for and in the presence of, Dr. Lala.    I, Dr. Lala, personally performed the services described in this documentation, as scribed by Pradeep Lock in my presence, and it is both accurate and complete.    Total Data Points: 5

## 2021-06-10 NOTE — PROGRESS NOTES
Assessment/Plan:       1. Establishing care with new doctor, encounter for  Reviewed patient's medical history, surgical history, family history, social history, and current problem list.  We also reviewed patient's medications and current concerns.    2. Constipation, unspecified constipation type  Recommended daily Metamucil for a trial of 1 week and a handout was provided.    3. Skin lesion  She has numerous skin lesions, none are particularly concerning the patient would like an examination performed by a dermatologist.  - Ambulatory referral to Dermatology    4. Colon cancer screening  - Cologuard    5. Visit for screening mammogram  - Mammo Screening Bilateral; Future      Return to clinic in 6 months for physical.     Subjective:      Iesha Patel is a 74 y.o. female who presents to clinic for establishment of care.    Patient and I discussed her history of endometrial cancer, no longer necessitating follow-up after hysterectomy and repeat normal Pap smears.  Patient also has a history of chronic constipation for which she takes daily laxatives.  Patient also has a history of a heart murmur which is different than the new found aortic stenosis.  She has not had any syncopal episodes.  Patient also has a history of osteopenia and tries to workout daily but does not do any weightbearing exercises.  Patient has chronic insomnia for which she takes trazodone and desires no changes in medication.  Patient does have a history of thyroid disease for which she is taking a significant amount of levothyroxine.  However, she was able to successfully wean off this and has had normal thyroid testing since that time.  During the surgery and while on a significant of levothyroxine patient had a single episode of atrial fibrillation.  This has not been repeated despite 2 separate heart monitors.  Patient is not on any rate controlling mdication or anticoagulation.      Past Medical History, Family History, and Social  History reviewed.     Current Outpatient Medications on File Prior to Visit   Medication Sig Dispense Refill     aspirin 81 MG EC tablet 81 mg daily.       cycloSPORINE (RESTASIS) 0.05 % ophthalmic emulsion Administer 1 drop to both eyes 2 (two) times a day.       hydrocortisone (ANUSOL-HC) 2.5 % rectal cream Apply rectally 2 times daily 30 g 1     MAGNESIUM HYDROXIDE (FOLEY MILK OF MAGNESIA ORAL) Take 1 capsule by mouth daily.       multivitamin with minerals (THERA-M) 9 mg iron-400 mcg Tab tablet Take 1 tablet by mouth daily.       POLYETHYLENE GLYCOL 3350 (MIRALAX ORAL) Powder.       traZODone (DESYREL) 50 MG tablet TAKE ONE OR TWO TABLETS BY MOUTH AT BEDTIME IF NEEDED FOR SLEEP 180 tablet 2     No current facility-administered medications on file prior to visit.        Review of systems is as stated in HPI.  The remainder of the 10 system review is otherwise negative.    Objective:     /82   Pulse 80   Wt 155 lb (70.3 kg)   SpO2 98%   BMI 23.57 kg/m   Body mass index is 23.57 kg/m .    Gen: Alert, NAD, appears stated age, normal hygiene   Psych: no apparent hallucinations or delusions, no pressured speech; alert, oriented x3  Skin: Numerous moles, freckles, and seborrheic keratoses      This note has been dictated using voice recognition software. Any grammatical or context distortions are unintentional and inherent to the the software.     Monica Carias MD

## 2021-06-11 NOTE — TELEPHONE ENCOUNTER
Medication Question or Clarification  Who is calling: Sadie Rush  What medication are you calling about (include dose and sig)?:   levothyroxine 25 mcg cap  1 capsule, Oral, QDaily         Summary: Take 1 capsule by mouth Daily at 6:00 am., Starting Tue 9/29/2020, Normal        Who prescribed the medication?: Monica Carias MD  What is your question/concern?: Pharmacist is asking if Dr Carias wanted capsules as tablets are available?  Please advise.   Requested [facility]acy: [facility]  Okay to leave a detailed message?: Yes

## 2021-06-11 NOTE — PROGRESS NOTES
Assessment/Plan:     Patient presents to clinic for follow-up of several months of fatigue.  We discussed that this is possibly multifactorial and tried to delineate the specific symptoms patient is experiencing.    Patient has a history of an abnormal thyroid, but has been doing well without medication.  We will recheck her thyroid labs today, I suspect she will have hypothyroidism and evidence on lab work.  I also suspect patient will likely have low vitamin D given her history of osteopenia and her demographic of being postmenopausal and living in [place].    We also discussed sleep.  I think that patient would benefit from a middle of the night melatonin and decreasing napping and increasing nighttime sleeping.  I also think that she would likely benefit from a sleep study but this was deferred at this appointment.    Another concern would be myasthenia gravis.  There is a second peak in the demographic at age of diagnosis later in life, although it is predominantly males.  The proximal shoulder weakness that occurs intermittently is the biggest factor for this being on the differential.  With shared decision-making we chose not to do an aggressive work-up for that today, to treat the more typical causes of fatigue and poor sleep, and to revisit in 1 month to see how patient is doing.    Problem List Items Addressed This Visit     Osteopenia    Relevant Orders    Vitamin D, Total (25-Hydroxy)      Other Visit Diagnoses     Healthcare maintenance    -  Primary    Relevant Orders    Influenza,Quad,High Dose,PF 65 YR+ (Completed)    Encounter for immunization         Relevant Orders    Influenza,Quad,High Dose,PF 65 YR+ (Completed)    Fatigue, unspecified type        Relevant Orders    Thyroid Cascade    Vitamin D, Total (25-Hydroxy)          Return to clinic in 4 weeks.     Subjective:      Iesha Patel is a 75 y.o. female who presents to clinic for tiredness and fatigue.    Has been feeling tired for  "several months.  It does not occur all the time but occurs most days.  It usually lasts for approximately an hour.  Occasionally she feels as though she needs to nap, but sometimes just experiences significant fatigue in her proximal shoulders.  She finds she has to \"quit which she is doing, and experiences an overwhelming need to sit down\".  She will rest and then feel better.  She believes it becoming more frequent.  It usually only occurs once per day.  She does not feel sleepy but does feel weary.  She does find that she is napping most days.    Patient does have a history of hypothyroidism which apparently improved after surgery.  She was discontinued on her medication and still did well.    Patient states her bedtime is approximately 9:30 PM, her lites will be off before 10 PM, she has a sleep latency of less than 15 minutes most nights.  However, she almost always will awaken once between the hours of 2:58 AM for the bathroom.  Then she experiences a \"twilight sleep\" from 4 to 6 AM.  She always awakens finally at 6 AM but she cannot lay in bed any longer.    Patient does believe she snores and wakes up with a dry mouth.  She has never had a sleep study and does experience some anxiety before bed.      Past Medical History, Family History, and Social History reviewed.     Current Outpatient Medications on File Prior to Visit   Medication Sig Dispense Refill     aspirin 81 MG EC tablet 81 mg daily.       cycloSPORINE (RESTASIS) 0.05 % ophthalmic emulsion Administer 1 drop to both eyes 2 (two) times a day.       hydrocortisone (ANUSOL-HC) 2.5 % rectal cream Apply rectally 2 times daily 30 g 1     MAGNESIUM HYDROXIDE (FOLEY MILK OF MAGNESIA ORAL) Take 1 capsule by mouth daily.       multivitamin with minerals (THERA-M) 9 mg iron-400 mcg Tab tablet Take 1 tablet by mouth daily.       POLYETHYLENE GLYCOL 3350 (MIRALAX ORAL) Powder.       traZODone (DESYREL) 50 MG tablet TAKE ONE OR TWO TABLETS BY MOUTH AT " BEDTIME IF NEEDED FOR SLEEP 180 tablet 2     No current facility-administered medications on file prior to visit.        Review of systems is as stated in HPI.  The remainder of the 10 system review is otherwise negative.    Objective:     /84   Pulse 78   Wt 153 lb (69.4 kg)   SpO2 98%   BMI 23.26 kg/m   Body mass index is 23.26 kg/m .    Gen: Alert, NAD, appears stated age, normal hygiene   Psych: no apparent hallucinations or delusions, no pressured speech; alert, oriented x3      This note has been dictated using voice recognition software. Any grammatical or context distortions are unintentional and inherent to the the software.     Monica Carias MD

## 2021-06-11 NOTE — TELEPHONE ENCOUNTER
Called patient to discuss the thyroid.  I would recommend restarting levothyroxine despite patient's atypical history with this medication.  She understands how to take it at a low dose, 25 mcg once daily, and see how patient does in 4 weeks.

## 2021-06-11 NOTE — TELEPHONE ENCOUNTER
It appears she has taken levothyroxine tablets in the past.  rx pended for approval if appropriate

## 2021-06-13 NOTE — PROGRESS NOTES
Chief Complaint   Patient presents with     Flu Vaccine     Flu consent and contraindication forms are given/ signed/ reviewed and sent to medical records to scan.     Mable Walker CMA [facility] 10/17/2017 3:58 PM

## 2021-06-15 PROBLEM — I35.0 AORTIC STENOSIS: Status: ACTIVE | Noted: 2017-04-10

## 2021-06-15 PROBLEM — M85.80 OSTEOPENIA: Status: ACTIVE | Noted: 2017-04-10

## 2021-06-16 PROBLEM — R53.83 FATIGUE: Status: ACTIVE | Noted: 2021-05-28

## 2021-06-16 NOTE — TELEPHONE ENCOUNTER
Telephone Encounter by Claire Humphreys at 4/12/2019  2:38 PM     Author: Claire Humphreys Service: -- Author Type: --    Filed: 4/12/2019  2:42 PM Encounter Date: 4/11/2019 Status: Signed    : Claire Humphreys APPROVED:    Approval start date: 4/11/19  Approval end date: 4/11/21    Pharmacy has been notified of approval and will contact patient when medication is ready for pickup.

## 2021-06-16 NOTE — PROGRESS NOTES
Preoperative Exam    Scheduled Procedure: back surgery - spinal stenosis with lumbar claudication and  synovial cyst removal   Surgery Date:  2-16-18  Surgery Location: [facility]    Surgeon:  Dr. Lencho Lindsey     Assessment/Plan:     1. Preop examination  For lumbar surgery for lumbar spinal stenosis, not responding to usual conservative intervention.  - Electrocardiogram Perform and Read  - Basic Metabolic Panel    2. Insomnia  Chronic long-standing.    3. Aortic stenosis  Mild, asymptomatic.  Echocardiogram April 2017.    4. Constipation  Chronic, long-standing, on daily MiraLAX and milk of magnesia.    5. Hypothyroidism  Patient's TSH is undetectable, indicating over treatment.  - Thyroid Stimulating Hormone (TSH)    6. Low back pain  Has worsened considerably, patient is now limited due to her low back pain.  - HM2(CBC w/o Differential)  - Electrocardiogram Perform - Clinic  - Basic Metabolic Panel     7. Anemia  CBC today within normal limits     8. Visit for screening mammogram  Be signed on mammography screening.  - Mammo Screening Bilateral      PLAN:  1.  Twelve-lead EKG reviewed, sinus rhythm with T-wave abnormality.  2.  CBC, basic metabolic profile and TSH reviewed.  3.  TSH undetectable, will change levothyroxine from 75 mcg to 50 mcg daily.  4.  The patient will come back in for lab only visit in April for TSH recheck.  5.  The patient has chronic constipation given that she will most likely be on narcotic medication after surgery, the patient will probably need to increase her milk of magnesia and/or MiraLAX.  6.  For sleep, trazodone  mg at night.  7.  Order for screening mammogram.  8.  The patient should be seen at least yearly, sooner as needed.        Surgical Procedure Risk: Intermediate (reported cardiac risk generally 1-5%)  Have you had prior anesthesia?: Yes  Have you or any family members had a previous anesthesia reaction:  No  Do you or any family members have a history of a  clotting or bleeding disorder?: No  Cardiac Risk Assessment: no increased risk for major cardiac complications    Patient approved for surgery with general or local anesthesia.    Please Note:  No prior problems with surgery or anesthesia.  See plan under concerned about constipation.    Functional Status: Independent  Patient plans to recover at home with family.     Subjective:      Iesha Patel is a 72 y.o. female who presents for a preoperative consultation. She was seen by Stevie Villaseñor at [facility] on 1/09/18. At that time, he reviewed the findings of her lumbar MRI, which showed  spondylolisthesis at L4-5 with bilateral subarticular stenosis.  He proposed decompression at L4-5 and L5-S1 in addition to posterior spinal fusion for stabilization of her spondylolisthesis. She has been experiencing the back pain for one year. She can only stand for about 4 minutes before the pain becomes unbearable. She uses a walker for stabilization with ambulation. She is taking the gabapentin 300 mg consistently.     Insomnia: She has had difficulty with sleep for several years, but it has worsened over the last year. She notes that the gabapentin significantly improves her quality of sleep and wonders if there is an alternative she could take as needed. She currently takes melatonin before bed. She has more difficulty falling asleep than staying asleep. The back pain is not contributing to her trouble falling asleep.     Constipation: She takes milk of magnesia and miralax daily to regulate bowel movements.     Hypothyroidism: She takes the levothyroxine 75 mg consistently.     Health Maintenance:   Immunizations: She has had an influenza vaccine this year.  Colon: She wouldrather not have a colonoscopy. She is considering a less invasive screening method.   Mammo: She believes that she has had a mammogram within the last 2 years, although our records indicate that she is overdue.     ROS:  She takes a  "fish oil supplement daily. She has stopped taking aspirin daily in preparation for her procedure. She has soreness in the right second finger. She notes that it \"clicks\" occasionally. All other systems reviewed and are negative, other than those listed in the HPI.    Pertinent History  Do you have difficulty breathing or chest pain after walking up a flight of stairs: No  History of obstructive sleep apnea: No  Steroid use in the last 6 months: No  Frequent Aspirin/NSAID use: Yes: baby   Prior Blood Transfusion: No  Prior Blood Transfusion Reaction: No  If for some reason prior to, during or after the procedure, if it is medically indicated, would you be willing to have a blood transfusion?:  There is no transfusion refusal.    Current Outpatient Prescriptions   Medication Sig Dispense Refill     aspirin 81 MG EC tablet 81 mg daily.       MAGNESIUM HYDROXIDE ([employer] MILK OF MAGNESIA ORAL) Take 1 capsule by mouth daily.       magnesium hydroxide ([employer] MILK OF MAGNESIA) 311 MG Chew        CHADWICK FLAVOR, BULK, MISC Use As Directed. Tablet, x2 daily       melatonin 1 mg Tab tablet Take 1 mg by mouth. 2 times daily       MULTIVITAMIN ORAL 1 tablet daily.       POLYETHYLENE GLYCOL 3350 (MIRALAX ORAL) Powder.       gabapentin (NEURONTIN) 300 MG capsule        levothyroxine (SYNTHROID) 50 MCG tablet Take 1 tablet (50 mcg total) by mouth daily. 90 tablet 1     RESTASIS MULTIDOSE 0.05 % Drop        traZODone (DESYREL) 50 MG tablet Take one to two at night as needed for sleep 60 tablet 2     No current facility-administered medications for this visit.         Allergies   Allergen Reactions     Penicillins Hives     Age 14, hives in mouth       Patient Active Problem List   Diagnosis     Chronic Constipation     Insomnia     Hypothyroidism     Low back pain     Endometrial cancer     Anemia     Osteopenia     Aortic stenosis       Past Medical History:   Diagnosis Date     Fatigue      Heart murmur 03/28/2017    Soft " "systolic murmur noted on exam     Hyperlipidemia 2015     Onychocryptosis 02/22/2012       Past Surgical History:   Procedure Laterality Date     BACK SURGERY  2/16/18    spinal stenosis with lumbar claudication and synovial cyst removal. Dr. Lencho Lindsey at [facility]     CHOLECYSTECTOMY  1991    Description: Cholecystectomy;  Recorded: 03/01/2013;  Comments: 1991 by Dr. Tucker     HYSTERECTOMY      Secondary to endometrial cancer.     LUMBAR SPINE SURGERY  1985    Right sciatic, lumbar laminectomy.     MOLE REMOVAL  09/13/2011    seborrheic keratosis removed from her right anterior abdominal wall     TOTAL HIP ARTHROPLASTY Left 01/05/2011    Dr. Albert at      TUBAL LIGATION  1980            Social History     Social History     Marital status:      Spouse name: N/A     Number of children: 3     Years of education: N/A     Occupational History      Retired     Social History Main Topics     Smoking status: Never Smoker     Smokeless tobacco: Never Used     Alcohol use No     Drug use: No     Sexual activity: Not on file     Other Topics Concern     Not on file     Social History Narrative    Diet- Regular        Exercise- Now nothing, walking in past              Objective:     Vitals:    02/13/18 0853   BP: 120/62   Pulse: 86   Resp: 12   Temp: 97.8  F (36.6  C)   TempSrc: Oral   SpO2: 97%   Weight: 151 lb (68.5 kg)   Height: 5' 8\" (1.727 m)         Physical Exam:  Constitutional: Vitals: per nursing notes.  HEENT: Right Ear: External ear normal.   Left Ear: External ear normal.   Nose: Nose normal.   Mouth/Throat: Oropharynx is clear and moist.   Eyes: Conjunctivae and EOM are normal. Pupils are equal, round, and reactive to light. Right eye exhibits no discharge. Left eye exhibits no discharge.   Lungs: Clear to A&P without rales or wheezes.  Respiratory effort normal.  Cardiac:   Regular rate and rhythm, normal S1, S2, no murmur or gallop.  Neuro:  Alert and oriented. Cranial nerves, " motor, sensory exams are intact.  No gross focal deficits.    Patient Instructions     Hold all supplements, aspirin and NSAIDs for 7 days prior to surgery.  Follow your surgeon's direction on when to stop eating and drinking prior to surgery.  Your surgeon will be managing your pain after your surgery.    Patient has chronic constipation, this will need to be increased after surgery, she is already on MiraLAX and milk of magnesia.      EKG: Sinus rhythm, T-wave abnormality.    Labs:  Recent Results (from the past 24 hour(s))   Electrocardiogram Perform and Read    Collection Time: 02/13/18  9:30 AM   Result Value Ref Range    SYSTOLIC BLOOD PRESSURE  mmHg    DIASTOLIC BLOOD PRESSURE  mmHg    VENTRICULAR RATE 75 BPM    ATRIAL RATE 75 BPM    P-R INTERVAL 162 ms    QRS DURATION 84 ms    Q-T INTERVAL 374 ms    QTC CALCULATION (BEZET) 417 ms    P Axis 79 degrees    R AXIS 45 degrees    T AXIS 67 degrees    MUSE DIAGNOSIS       Normal sinus rhythm  T wave abnormality, consider anterior ischemia  Abnormal ECG  No previous ECGs available  Confirmed by JESSICA AREVALO MD LOC: ([id number]) on 2/13/2018 3:02:23 PM     Thyroid Stimulating Hormone (TSH)    Collection Time: 02/13/18  9:34 AM   Result Value Ref Range    TSH <0.01 (L) 0.30 - 5.00 uIU/mL   HM2(CBC w/o Differential)    Collection Time: 02/13/18  9:34 AM   Result Value Ref Range    WBC 6.9 4.0 - 11.0 thou/uL    RBC 4.29 3.80 - 5.40 mill/uL    Hemoglobin 12.4 12.0 - 16.0 g/dL    Hematocrit 37.7 35.0 - 47.0 %    MCV 88 80 - 100 fL    MCH 29.0 27.0 - 34.0 pg    MCHC 32.9 32.0 - 36.0 g/dL    RDW 13.9 11.0 - 14.5 %    Platelets 250 140 - 440 thou/uL    MPV 6.4 (L) 7.0 - 10.0 fL   Basic Metabolic Panel    Collection Time: 02/13/18  9:34 AM   Result Value Ref Range    Sodium 141 136 - 145 mmol/L    Potassium 4.2 3.5 - 5.0 mmol/L    Chloride 104 98 - 107 mmol/L    CO2 30 22 - 31 mmol/L    Anion Gap, Calculation 7 5 - 18 mmol/L    Glucose 91 70 - 125 mg/dL    Calcium 9.6 8.5  - 10.5 mg/dL    BUN 16 8 - 28 mg/dL    Creatinine 0.73 0.60 - 1.10 mg/dL    GFR MDRD Af Amer >60 >60 mL/min/1.73m2    GFR MDRD Non Af Amer >60 >60 mL/min/1.73m2       Immunization History   Administered Date(s) Administered     DT (pediatric) 02/07/2006     Hep A, Adult IM (19yr & older) 08/26/2010, 08/11/2011     Hep A, historic 08/26/2010, 08/11/2011     Influenza high dose, seasonal 10/17/2017     Pneumo Conj 13-V (2010&after) 03/28/2017     Td, Adult, Absorbed 05/20/2009     Td,adult,historic,unspecified 05/20/2009     ADDITIONAL HISTORY SUMMARIZED (2): Reviewed note from [facility] regarding proposed procedure and lumbar MRI results.   DECISION TO OBTAIN EXTRA INFORMATION (1): None.   RADIOLOGY TESTS (1): None.  LABS (1): Ordered CBC today.   MEDICINE TESTS (1): Ordered EKG today.   INDEPENDENT REVIEW (2 each): None.   TOTAL DATA POINTS: 4.      The visit lasted a total of 21 minutes face to face with the patient. Over 50% of the time was spent counseling and educating the patient about her procedure.    I, Carina Yu, am scribing for and in the presence of, Dr. Jamshid Lala.    I, Dr. Lala, personally performed the services described in this documentation, as scribed by Carina Yu in my presence, and it is both accurate and complete.      Electronically signed by Jamshid Lala MD 02/13/18 8:47 AM

## 2021-06-16 NOTE — TELEPHONE ENCOUNTER
The 10-year ASCVD risk score (Rabia BHATIA Jr., et al., 2013) is: 14.6%    Values used to calculate the score:      Age: 75 years      Sex: Female      Is Non- : No      Diabetic: No      Tobacco smoker: No      Systolic Blood Pressure: 138 mmHg      Is BP treated: No      HDL Cholesterol: 85 mg/dL      Total Cholesterol: 204 mg/dL      Called patient to discuss the results of lipid and ASCVD results. Recommend high intensity statin.   Also recommended echo given otherwise normal labs and persistent fatigue with a history of aortic stenosis.

## 2021-06-16 NOTE — PROGRESS NOTES
ASSESSMENT:  1. Atrial fibrillation  Recent diagnosis after surgery with rapid ventricular response and spontaneous cardioversion.  I suspect that this is more related to postsurgical state rather than underlying low TSH so that certainly could be a contributing factor.  - Holter Monitor; Future    2. Low back pain  Patient is status post lumbar fusion.    3. Hypothyroidism  Patient has had a very low TSH and has had thyroid adjustments.  - Thyroid Stimulating Hormone (TSH); Future    4. Insomnia  Chronic long-standing, trazodone is helpful though she got the best results from gabapentin.    5. Osteopenia  From prior DEXA scan.      PLAN:  1.  In terms of the thyroid, patient is currently on 50 mcg, repeat TSH on or after April 9 and proceed accordingly.  Of note, it was found after the visit the patient is in fact on 50 mcg not 25 mcg.  The patient was previously on 75 mcg this is decreased to 50 mcg in February.  2.  Repeat Holter monitor March 26, if this documents normal sinus rhythm then I would discontinue Xarelto, metoprolol and aspirin.  3.  Discussed with the patient that her rehabilitation from low back pain will be months to a year or perhaps even more.  4.  In terms of the insomnia, discussed with the patient that for now she will stay on trazodone but she got the best results from gabapentin and that is a potential future option.  5.  Follow-up in 3 months with clinic visit, see earlier as needed.    Orders Placed This Encounter   Procedures     Thyroid Stimulating Hormone (TSH)     Standing Status:   Future     Standing Expiration Date:   3/12/2019     Medications Discontinued During This Encounter   Medication Reason     levothyroxine (SYNTHROID) 50 MCG tablet Dose adjustment       No Follow-up on file.    CHIEF COMPLAINT:  Chief Complaint   Patient presents with     Hospital Visit Follow Up     short term rehab for lumbar clotication - doing better - talk about bone denisty exam and rx for osteo       "Heart Problem     talk about cardiology issues , has afib in hospital      Medication Management     talk about pain and rx - having discomfort and taking tylonol which helps slightly at times        SUBJECTIVE:  Iesha is a 72 y.o. female presenting to the clinic for a hospital visit follow-up regarding spinal stenosis. She is accompanied by her daughter today.     Spinal Stenosis: She underwent surgery for spinal stenosis at [facility] on 2/26/18. Following the surgery, she required short-term rehabilitation for lumbar claudication. She has been told that she should not engage in bending, twisting, or lifting for at least a couple of weeks. Today, her pain has improved, but her legs continue to greatly ache. She finds that she is most comfortable when lying down. She was given a prescription for Valium to take for her pain, but she requests a different medication because she does not tolerate the Valium well.     Atrial Fibrillation: Following her surgery for spinal stenosis, her heart went into atrial fibrillation. She had never experienced an episode of atrial fibrillation before. Following the event, she was started on both Xarelto and Toprol. She is also on a daily aspirin. She inquires if she should get a referral to cardiology for further evaluation.     Hypothyroidism: When she was seen for her pre-operative examination on 2/13/18, her prescription for levothyroxine was decreased. After she went into atrial fibrillation following the surgery, the surgeons further lowered her prescription as they believed it was a potential source of the abnormal heart rhythm.     Osteopenia: She is interested in getting started on a medication for osteopenia/osteoporosis. Her concern comes from her surgeon telling her that her bones were \"soft\". Her last DXA scan was on 4/5/17 and revealed that she has osteopenia.     Insomnia: She continues to take trazodone for insomnia. She was taken off of gabapentin following the " surgery though notes that she was able to sleep with the use of the medication. She would like to resume the use of the medication in the future.     Health Maintenance: She received the influenza vaccine for the season on 10/17/17.     REVIEW OF SYSTEMS:   All other systems are negative.    PFSH:  Immunization History   Administered Date(s) Administered     DT (pediatric) 02/07/2006     Hep A, Adult IM (19yr & older) 08/26/2010, 08/11/2011     Hep A, historic 08/26/2010, 08/11/2011     Influenza high dose, seasonal 10/17/2017     Pneumo Conj 13-V (2010&after) 03/28/2017     Td, Adult, Absorbed 05/20/2009     Td,adult,historic,unspecified 05/20/2009     Social History     Social History     Marital status:      Spouse name: N/A     Number of children: 3     Years of education: N/A     Occupational History      Retired     Social History Main Topics     Smoking status: Never Smoker     Smokeless tobacco: Never Used     Alcohol use No     Drug use: No     Sexual activity: Not on file     Other Topics Concern     Not on file     Social History Narrative    Diet- Regular        Exercise- Now nothing, walking in past     Past Medical History:   Diagnosis Date     Heart murmur 03/28/2017    Soft systolic murmur noted on exam     Onychocryptosis 02/22/2012     Family History   Problem Relation Age of Onset     Cancer Brother      testicular, bladder, kidney, skin     Diabetes type II Brother      Prostate cancer Brother      Heart disease Brother 59     heart attack      Valvular heart disease Mother      Diabetes type II Mother      Stroke Father      Diabetes Brother      Cancer Brother      Allergies Brother        MEDICATIONS:  Current Outpatient Prescriptions   Medication Sig Dispense Refill     metoprolol succinate (TOPROL-XL) 50 MG 24 hr tablet Take 50 mg by mouth daily.       rivaroxaban 20 mg Tab Take by mouth.       aspirin 81 MG EC tablet 81 mg daily.       gabapentin (NEURONTIN) 300 MG capsule         levothyroxine (SYNTHROID, LEVOTHROID) 25 MCG tablet Take 1 tablet (25 mcg total) by mouth daily.  0     MAGNESIUM HYDROXIDE ([employer] MILK OF MAGNESIA ORAL) Take 1 capsule by mouth daily.       magnesium hydroxide ([employer] MILK OF MAGNESIA) 311 MG Chew        CHADWICK FLAVOR, BULK, MISC Use As Directed. Tablet, x2 daily       melatonin 1 mg Tab tablet Take 1 mg by mouth. 2 times daily       MULTIVITAMIN ORAL 1 tablet daily.       POLYETHYLENE GLYCOL 3350 (MIRALAX ORAL) Powder.       RESTASIS MULTIDOSE 0.05 % Drop        tiZANidine (ZANAFLEX) 2 MG tablet Take 1 tablet (2 mg total) by mouth every 8 (eight) hours as needed. 30 tablet 0     traZODone (DESYREL) 50 MG tablet Take one to two at night as needed for sleep 60 tablet 2     No current facility-administered medications for this visit.        TOBACCO USE:  History   Smoking Status     Never Smoker   Smokeless Tobacco     Never Used       VITALS:  Vitals:    03/12/18 1352   BP: (!) 84/66   Pulse: 88   SpO2: 97%   Weight: 142 lb (64.4 kg)     Wt Readings from Last 3 Encounters:   03/12/18 142 lb (64.4 kg)   02/13/18 151 lb (68.5 kg)   04/07/17 148 lb (67.1 kg)       PHYSICAL EXAM:  Constitutional:   Reveals a pleasant female that appears stated age.  Vitals: per nursing notes.  Neuro:  Alert and oriented. Cranial nerves, motor, sensory exams are intact.  No gross focal deficits.  Psychiatric:  Memory intact, mood appropriate.    DATA REVIEWED:  Additional History from Old Records Summarized (2): Reviewed discharge summary 3/2/18; spinal stenosis.   Decision to Obtain Records (1): Accessed Care Everywhere.    Radiology Tests Summarized or Ordered (1): Reviewed DXA scan 4/5/17; osteopenia.   Labs Reviewed or Ordered (1): Reviewed labs 2/13/18; TSH.   Medicine Test Summarized or Ordered (1): Ordered future Holter monitor.  Independent Review of EKG, X-RAY, or RAPID STREP (2 each): None.     The visit lasted a total of 36 minutes face to face with the patient. Over  50% of the time was spent counseling and educating the patient about atrial fibrillation.    I, Efren Clancy, am scribing for and in the presence of, Dr. Lala.    I, Dr. Lala, personally performed the services described in this documentation, as scribed by Efren Clancy in my presence, and it is both accurate and complete.    Total data points: 6

## 2021-06-16 NOTE — TELEPHONE ENCOUNTER
Called patient to talk with her about scheduling her appointment with Dr. Lyons to be after her breast imaging, which is scheduled for May 4, 2021.  LMOM for her to return my call to reschedule her appointment.

## 2021-06-16 NOTE — PROGRESS NOTES
[facility]    Facility:   [facility] [[id number]]   Code Status: POLST AVAILABLE    Follow-up evaluation and discharge planning of 72-year-old female who underwent spinal fusion for chronic back pain with radicular symptoms right leg, postoperative course complicated with new onset atrial fibrillation, anticoagulated, stabilized and transferred to TCU for rehabilitation and management of medical problems. Course in TCU has been uncomplicated, patient desired to discontinue oxycodone, Ultram 50 mg Q8 hours PRN adequate in controlling pain. Pain limited to surgical site, radicular pain present preoperatively resolved. Patient is completing course of physical therapy. No cardiac symptomatology during TCU stay. Anticipate discharge to home setting in 24 hours.    Review of systems: denies chest pain or palpitations. Pain adequately controlled but remains symptomatic. History of constipation not currently symptomatic. No new radicular symptoms. Denies fever sweats or chills. Anxious to return to home setting. Remainder of 12 point review of systems obtained negative.    Exam: vital signs reviewed over past 48 hours. Patient alert and oriented times three, in no apparent distress. Mucous membranes moist. No credit bruits. S1 and S2 regular with 2/6 systolic murmur. Pulmonary exam without rales rhonchi or wheezes. Abdomen without masses tenderness organomegaly. Surgical site with wound edges well approximated, no surrounding erythema or drainage. Strength symmetrical upper and lower extremities. No calf tenderness or swelling. Pedal pulses symmetrical.    Impression and plan: status post spinal fusion, no evidence of wound complication, pain control adequate with Ultram, oxycodone discontinued. New onset atrial fibrillation, heart rate currently controlled, anticoagulated. History of constipation with adequate control. History of hypothyroidism. Discharge medications are as  follows:  Current Outpatient Prescriptions:      aspirin 81 MG EC tablet, 81 mg daily., Disp: , Rfl:      gabapentin (NEURONTIN) 300 MG capsule, , Disp: , Rfl:      levothyroxine (SYNTHROID, LEVOTHROID) 25 MCG tablet, Take 1 tablet (25 mcg total) by mouth daily., Disp: , Rfl: 0     MAGNESIUM HYDROXIDE ([employer] MILK OF MAGNESIA ORAL), Take 1 capsule by mouth daily., Disp: , Rfl:      magnesium hydroxide ([employer] MILK OF MAGNESIA) 311 MG Chew, , Disp: , Rfl:      CHADWICK FLAVOR, BULK, MISC, Use As Directed. Tablet, x2 daily, Disp: , Rfl:      melatonin 1 mg Tab tablet, Take 1 mg by mouth. 2 times daily, Disp: , Rfl:      metoprolol succinate (TOPROL-XL) 50 MG 24 hr tablet, Take 50 mg by mouth daily., Disp: , Rfl:      MULTIVITAMIN ORAL, 1 tablet daily., Disp: , Rfl:      POLYETHYLENE GLYCOL 3350 (MIRALAX ORAL), Powder., Disp: , Rfl:      RESTASIS MULTIDOSE 0.05 % Drop, , Disp: , Rfl:      rivaroxaban 20 mg Tab, Take by mouth., Disp: , Rfl:      tiZANidine (ZANAFLEX) 2 MG tablet, Take 1 tablet (2 mg total) by mouth every 8 (eight) hours as needed., Disp: 30 tablet, Rfl: 0     traZODone (DESYREL) 50 MG tablet, Take one to two at night as needed for sleep, Disp: 60 tablet, Rfl: 2 follow up will be with regular physician over next week, total time of discharge evaluation greater than 30 minutes, greater than 50% of time spent in coordination of care and counseling.      Electronically signed by: Kelle Dove MD

## 2021-06-16 NOTE — PROGRESS NOTES
[facility] Patient Outreach:     Discharge Date::  3/9/18      Reason for TCU stay (discharge diagnosis)::  Spinal fusion, A-fib      Are you feeling better, the same or worse since your discharge?:  Patient is feeling better          As part of your discharge plan, did they discuss home care with you?: No            Did you receive any new medications, or was there a change to your medications?: No            Do you have any follow up visits scheduled with your PCP or Specialist?:  Yes, with PCP      (RN) Is it scheduled soon enough (3-5 days)?: Yes

## 2021-06-16 NOTE — PROGRESS NOTES
[facility]    Facility:   [facility] [[id number]]   Code Status: POLST AVAILABLE    Admission evaluation to TCU of 72-year-old female. History is taken from accompanying hospital notes and from patient who is an excellent historian. Chronic low back pain, radicular pain right leg, chronic peripheral neuropathic pain, hypothyroidism, chronic insomnia and constipation, underwent spinal fusion, postoperative course complicated with new onset atrial fibrillation, heart rate controlled, beta blocker initiated, Coumadin initiated, stabilized and transferred to TCU for rehabilitation, pain management, management of medical problems.    Past medical history, social history, family history, drug allergies, current medication list, current problem list reviewed in epic.    Review of systems: denies chest pain or palpitations. Concerned regarding use of Coumadin. No personal history of atrial fibrillation. Denies orthopnea or PND. Recent satisfactory thyroid function study. Has been on current dose of Synthroid for some time. Nausea with pain medication. Previous radicular pain resolved, pain focal to lumbar region. Insomnia ongoing. Neuropathic pain lower extremities improved. Remainder of 12 point review of systems obtained negative.    Exam: vital signs reviewed since admission to TCU. Patient conversant and delightful, in no apparent distress. No facial asymmetry. No credit bruits. S1 and S2 regular with 2/6 systolic murmur. Pulmonary exam without rales rhonchi or wheezes. Abdomen without masses tenderness organomegaly. Surgical wound lumbar with wound edges well approximated, no surrounding erythema. Pedal pulses symmetrical. Strength and sensation intact upper and lower extremities. No hand drift. No calf tenderness or swelling.    Impression and plan: status post lumbar fusion, pre-operative right leg pain resolved, wound site with satisfactory status, no new focal neurologic  deficits. Nausea with hydrocodone, begin tramadol 50 mg Q6 hours PRN, monitor for persistent nausea. New onset atrial fibrillation, heart rate regular at present, Coumadin use reviewed. Insomnia on melatonin and trazodone, sleep with poor quality and TCU. Hypothyroidism on replacement. Multiple medical concerns are reviewed, detailed discussion regarding atrial fibrillation and anticoagulation. Total time of admission evaluation greater than 45 minutes, greater than 50% of time spent in coordination of care and counseling.      Electronically signed by: Kelle Dove MD

## 2021-06-16 NOTE — PROGRESS NOTES
Assessment and Plan:   Patient presents to clinic for annual wellness visit.    USPSTF Recommendations for age 75:  Patient has been counseled on/screened for:  - intimate partner violence and there are no concerns at this time  - a healthful diet and physical activity for CVD prevention  - Diabetes and hyperlipidemia: screening was performed.   - Hep C, ordered today  - Asprin use: patient chose to continue  Sexually transmitted infections: Patient would not like to be screened for chlamydia, gonorrhea, syphilis, HIV, and hepatitis  Colorectal Cancer: Cologuard ordered previously, pt will send in  Immunizations: up to date  Mammogram: performed in 2020  Bone Density: Dexa scan ordered today  Fall risk assessment: Get up and go test completed today without concern      1. Chronic fatigue  If patient's thyroid, vitamin D, and hemoglobin are normal, I would recommend that patient have a repeat cardiovascular evaluation either stress test or an echo given known aortic stenosis.  - Thyroid Cascade  - Vitamin D, Total (25-Hydroxy)  - Hemoglobin    2. Breast skin changes  3. Nipple discharge  Although there are no obvious skin findings on exam, patient does endorse significant and persistent right-sided breast pruritus on the lateral aspect of the breast tissue.  I can appreciate nonspontaneous straw-colored nipple discharge on that side as well.  Given patient's age and these findings, I do think is reasonable to refer to the breast surgeons for evaluation.  Most recent mammogram was less than 1 year ago and normal.  - Ambulatory referral to General Surgery    4. Osteopenia, unspecified location  - DXA Bone Density Scan; Future  - Vitamin D, Total (25-Hydroxy)          The patient's current medical problems were reviewed.    I have had an Advance Directives discussion with the patient.     The following health maintenance schedule was reviewed with the patient and provided in printed form in the after visit summary:    Health Maintenance Due   Topic Date Due     HEPATITIS C SCREENING  Never done        Subjective:   Chief Complaint: Iesha Patel is an 75 y.o. female here for an Annual Wellness visit.   HPI:    Some days patient just doesn't feel well. It comes and goes. It could last a week or she could have several days of feeling fine. She does not know how to determine triggers or additional symptom and hasn't been able to do so.     She has also noted a lateral itching on the right breast. Most recent mammogram was in 2020.     Review of Systems: Please see above.  The rest of the review of systems are negative for all systems.    Patient Care Team:  Monica Carias MD as PCP - General (Family Medicine)  Monica Carias MD as Assigned PCP     Patient Active Problem List   Diagnosis     Chronic Constipation     Insomnia     Osteopenia     Aortic stenosis     Past Medical History:   Diagnosis Date     Atrial fibrillation (H)     single episode never repeated, two separate monitor instances     Endometrial cancer (H) 2000     Insomnia       Past Surgical History:   Procedure Laterality Date     BACK SURGERY  2/16/18    spinal stenosis with lumbar claudication and synovial cyst removal. Dr. Lencho Lindsey at [facility]     CHOLECYSTECTOMY  1991    Description: Cholecystectomy;  Recorded: 03/01/2013;  Comments: 1991 by Dr. Tucker     HYSTERECTOMY  2000    Secondary to endometrial cancer.     LUMBAR FUSION  02/2018    L4 through S1     LUMBAR SPINE SURGERY  1985    Right sciatic, lumbar laminectomy.     MOLE REMOVAL  09/13/2011    seborrheic keratosis removed from her right anterior abdominal wall     OOPHORECTOMY Bilateral 2000     TOTAL HIP ARTHROPLASTY Left 01/05/2011    Dr. Albert at      TUBAL LIGATION  1980           Family History   Problem Relation Age of Onset     Cancer Brother         testicular, bladder, kidney, skin     Diabetes type II Brother      Prostate cancer Brother      Heart disease  Brother 59        heart attack      Valvular heart disease Mother      Diabetes type II Mother      Stroke Father      Alcoholism Daughter      Diabetes Brother      Cancer Brother      Allergies Brother       Social History     Socioeconomic History     Marital status:      Spouse name: Not on file     Number of children: 3     Years of education: Not on file     Highest education level: Not on file   Occupational History     Occupation:      Employer: RETIRED   Social Needs     Financial resource strain: Not on file     Food insecurity     Worry: Not on file     Inability: Not on file     Transportation needs     Medical: Not on file     Non-medical: Not on file   Tobacco Use     Smoking status: Never Smoker     Smokeless tobacco: Never Used   Substance and Sexual Activity     Alcohol use: No     Drug use: Yes     Comment: rare     Sexual activity: Not Currently     Partners: Female   Lifestyle     Physical activity     Days per week: Not on file     Minutes per session: Not on file     Stress: Not on file   Relationships     Social connections     Talks on phone: Not on file     Gets together: Not on file     Attends Anabaptist service: Not on file     Active member of club or organization: Not on file     Attends meetings of clubs or organizations: Not on file     Relationship status: Not on file     Intimate partner violence     Fear of current or ex partner: Not on file     Emotionally abused: Not on file     Physically abused: Not on file     Forced sexual activity: Not on file   Other Topics Concern     Not on file   Social History Narrative    Diet- Regular        Exercise- Now nothing, walking in past        Senior Living         twice      Current Outpatient Medications   Medication Sig Dispense Refill     aspirin 81 MG EC tablet 81 mg daily.       cycloSPORINE (RESTASIS) 0.05 % ophthalmic emulsion Administer 1 drop to both eyes 2 (two) times a day.       hydrocortisone (ANUSOL-HC) 2.5  "% rectal cream Apply rectally 2 times daily 30 g 1     levothyroxine (SYNTHROID, LEVOTHROID) 25 MCG tablet Take 1 tablet (25 mcg total) by mouth daily. 90 tablet 1     MAGNESIUM HYDROXIDE ([employer] MILK OF MAGNESIA ORAL) Take 1 capsule by mouth daily.       POLYETHYLENE GLYCOL 3350 (MIRALAX ORAL) Powder.       No current facility-administered medications for this visit.       Objective:   Vital Signs:   Visit Vitals  /78   Pulse 66   Ht 5' 8\" (1.727 m)   Wt 154 lb (69.9 kg)   SpO2 98%   BMI 23.42 kg/m           VisionScreening:  No exam data present     PHYSICAL EXAM  Gen: Alert, NAD, appears stated age, normal hygiene   Eyes: conjunctivae without injection, sclera clear, EOMI  ENT/mouth: nares clear, septum midline, absent rhinorrhea, throat without injection, neck is supple, no thyroid enlargement  CV: RRR, however, the heart sounds are distant, no murmur appreciated at this time, pedal edema absent bilaterally  Resp: CTAB, no wheezes, rales or ronchi  ABD: normoactive, non-tender to palpation, nondistended  MSK: grossly full range of motion in all joints, no obvious deformity  Neuro: CN II-XII grossly intact, no deficits in coordination  Psych: no apparent hallucinations or delusions, no pressured speech; alert, oriented x3  SKIN: Innumerable lesions scattered across patient's skin, most are seborrheic keratoses although several appear inflamed and irritated, possible actinic keratoses  Heme/lymph: no pallor, no active bleeding/bruising, no adenopathy appreciated  Breast: nontender to palpation, no masses appreciated, right sided unilateral straw-colored discharge appreciated, no obvious skin findings although patient does endorse significant pruritus in that area        Assessment Results 4/6/2021   Activities of Daily Living No help needed   Instrumental Activities of Daily Living No help needed   Mini Cog Total Score 3   Some recent data might be hidden     A Mini-Cog score of 0-2 suggests the " possibility of dementia, score of 3-5 suggests no dementia    Identified Health Risks:     The patient was counseled and encouraged to consider modifying their diet and eating habits. She was provided with information on recommended healthy diet options.  Information regarding advance directives (living [place]), including where she can download the appropriate form, was provided to the patient via the AVS.

## 2021-06-17 NOTE — PROGRESS NOTES
History:  This is a 75 y.o. woman who I'm asked to see (by Dr. Carias) for evaluation of right nipple discharge and breast itching.  She states that for the last several months she has had daily itching to the right breast.  It occurs on the lateral aspect.  It occurs only during the day and improves with scratching.  It does not bother her at night and it does not keep her from sleeping.  She has not tried any topical treatment such as lotion.  Has not tried any medications.  She denies any other new symptoms to her breasts.  She denies any spontaneous nipple discharge.  Does not have any skin changes or palpable masses.  When she recently went for a physical exam, nipple discharge on the right side was able to be elicited.  The patient has never noticed fluid coming from her nipple before this or since then.  She has never needed a needle biopsy in the past.    Past medical history:  Endometrial cancer  Hyperlipidemia  Hypothyroidism  Aortic stenosis    Past surgical history:  Lumbar surgery  Cholecystectomy  Hysterectomy  Left CONOR  Tubal ligation    Medications:     cycloSPORINE (RESTASIS) 0.05 % ophthalmic emulsion, Administer 1 drop to both eyes 2 (two) times a day., Disp: , Rfl:      hydrocortisone (ANUSOL-HC) 2.5 % rectal cream, Apply rectally 2 times daily, Disp: 30 g, Rfl: 1     levothyroxine (SYNTHROID, LEVOTHROID) 25 MCG tablet, Take 1 tablet (25 mcg total) by mouth daily., Disp: 90 tablet, Rfl: 1     MAGNESIUM HYDROXIDE (FOLEY MILK OF MAGNESIA ORAL), Take 1 capsule by mouth daily., Disp: , Rfl:      POLYETHYLENE GLYCOL 3350 (MIRALAX ORAL), Powder., Disp: , Rfl:     Allergies:  Penicillin    Social history:  Reports that she has never smoked. She has never used smokeless tobacco.  She reports that she does not drink alcohol or use illicit drugs.    Family history:  She has no family history of breast cancer.  Her brother had kidney cancer, bladder cancer, and testicular cancer.    Review of  "systems:  General: No complaints or constitutional symptoms  Skin: No complaints or symptoms   Hematologic/Lymphatic: No symptoms or complaints  Psychiatric: No symptoms or complaints  Endocrine: No excessive fatigue, no hypermetabolic symptoms reported  Respiratory: No cough, shortness of breath, or wheezing  Cardiovascular: No chest pain or dyspnea on exertion  Breast: Right nipple discharge with palpation only.  Gastrointestinal: No abdominal pain, nausea, diarrhea, or constipation  Musculoskeletal: No recent injuries reported  Neurological: No focal neurologic defects reported.      Exam:  Resp 16   Ht 5' 8\" (1.727 m)   Wt 154 lb (69.9 kg)   Breastfeeding No   BMI 23.42 kg/m    Body mass index is 23.42 kg/m .  General: Alert, cooperative, appears stated age   Skin: Skin color, texture, turgor normal, no rashes or lesions   Lymphatic: No obvious adenopathy, no swelling   Eyes: No scleral icterus, pupils equal  HENT: No traumatic injury to the head or face, no gross abnormalities  Lungs: Normal respiratory effort, breath sounds equal bilaterally  Heart: Regular rate and rhythm  Breasts: No skin dimpling, palpable mass or adenopathy.  The skin is overall visibly and palpably dry.  Multiple seborrheic keratosis across her chest.  Right clear nipple discharge could be elicited with significant palpation from a single duct within the upper inner quadrant.  Abdomen: Soft, non-distended and non-tender to palpation  Neurologic: Grossly intact    Imaging:  Pertinent images personally reviewed by myself and discussed with the patient.  Radiology reports:  EXAM: MAMMO DIAGNOSTIC 3D RIGHT, US BREAST RIGHT LIMITED 1-3 QUADRANTS  LOCATION: [facility]  DATE/TIME: 5/4/2021 1:06 PM     INDICATION: 75-year-old female with occasional pruritus of the skin of the right lateral breast; no overlying skin changes. Additionally, patient's provider noticed nonspontaneous straw-colored right nipple discharge " upon physical examination.     COMPARISON: Prior mammogram exams, including 08/26/2020, 09/05/2018, 01/06/2016, 09/21/2012     MAMMOGRAPHIC FINDINGS: Right full-field digital diagnostic mammograms performed. There are scattered areas of fibroglandular density. Images evaluated with the assistance of CAD. Breast tomosynthesis was used in interpretation. No suspicious masses, calcifications, or architectural distortion. Stable 5 x 4 x 3 mm mass in the central breast at middle depth with a 4 x 4 mm asymmetry in the retroareolar breast anterior depth, seen only on the CC view. Stable benign-appearing intramammary lymph nodes in the upper outer quadrant. No significant interval change.     ULTRASOUND FINDINGS: Targeted ultrasound of the retroareolar right breast in the area of clinical concern demonstrates unremarkable appearance of the retroareolar region. Specifically, no subareolar duct ectasia, intraductal/extraductal masses or suspicious shadowing. Benign 4 x 3 x 3 mm simple cyst at 6:00 position corresponds to the stable mammographic mass.     IMPRESSION:   No radiographic findings of malignancy. Specifically, no imaging findings to explain the episode of nonspontaneous right nipple discharge. The patient was instructed to return to the breast center should she experience any spontaneous unilateral clear or bloody nipple discharge arising from one duct. Otherwise, she should continue with routine annual screening mammography schedule.      These findings and recommendations were discussed with the patient at the conclusion of the examination.      ACR BI-RADS Category 2: Benign.    Pathology:  None    IMPRESSION:   Right nipple discharge and breast pruritis    PLAN:   Discussed the pathophysiology of nipple discharge, including the difference between physiologic and pathologic nipple discharge.  Physiologic nipple discharge is usually related to hyperprolactinemia.  This can be caused by trauma, stress (which could  include as a surgical procedure), medications or anesthesia.  More often it is bilateral.  It can be worked up with laboratories, including prolactin, UPT, thyroid function, and renal function.  Pathologic discharge is more commonly unilateral, localized to one duct, persistent, and spontaneous (as opposed to provoked).  Pathologic nipple discharge is most commonly due to intraductal papillomas, but 5-15% of the time, it is due to malignancy.  Based off of her presentation, she does not have any worrisome factors.  She does not appear to have either pathologic or physiologic nipple discharge.  She should continue with her screening mammograms.  In regard to the pruritus, I think the next and first step would be to work on moisturizing.  She currently uses lotion on her extremities for dry skin.  She will start doing this on her breasts as well.  If the itching continues, she can try topical Benadryl in this location.  This can be found over-the-counter.  I reassured her that this symptom is not worrisome for an underlying carcinoma.  She can follow-up in the breast clinic as needed.    Rema Lyons DO  General Surgeon  [facility]  Breast [facility]  [address] [address]  Office: [phone]  Employed by - [facility]

## 2021-06-17 NOTE — PROGRESS NOTES
"Pierce presents to [facility] today for a surgical consult with Dr. Lyons  regarding a right breast concern with itching and nipple discharge. This was only found on CBE with her primary md.  Never spontaneous.  Recent imaging was done and is negative, see report for details. .  RN assessment and EMR update. Resp 16   Ht 5' 8\" (1.727 m)   Wt 154 lb (69.9 kg)   Breastfeeding No   BMI 23.42 kg/m    Patient met with Dr. Lyons .  See dictation for details of visit and follow up plan.   RN time 12 mins.  "

## 2021-06-17 NOTE — PATIENT INSTRUCTIONS - HE
Patient Instructions by Jamshid Lala MD at 12/10/2019 11:30 AM     Author: Jamshid Lala MD Service: -- Author Type: Physician    Filed: 12/10/2019  1:09 PM Encounter Date: 12/10/2019 Status: Addendum    : Jamshid Lala MD (Physician)    Related Notes: Original Note by Jamshid Lala MD (Physician) filed at 12/10/2019 11:40 AM       On laboratory studies.  See us again in 1 year for another wellness examination.        Patient Education   Understanding USDA MyPlate  The USDA (US Department of Agriculture) has guidelines to help you make healthy food choices. These are called MyPlate. MyPlate shows the food groups that make up healthy meals using the image of a place setting. Before you eat, think about the healthiest choices for what to put onto your plate or into your cup or bowl. To learn more about building a healthy plate, visit www.choosemyplate.gov.       The Food Groups    Fruits: Any fruit or 100% fruit juice counts as part of the Fruit Group. Fruits may be fresh, canned, frozen, or dried, and may be whole, cut-up, or pureed. Make half your plate fruits and vegetables.    Vegetables: Any vegetable or 100% vegetable juice counts as a member of the Vegetable Group. Vegetables may be fresh, frozen, canned, or dried. They can be served raw or cooked and may be whole, cut-up, or mashed. Make half your plate fruits and vegetables.     Grains: All foods made from grains are part of the Grains Group. These include wheat, rice, oats, cornmeal, and barley such as bread, pasta, oatmeal, cereal, tortillas, and grits. Grains should be no more than a quarter of your plate. At least half of your grains should be whole grains.    Protein: This group includes meat, poultry, seafood, beans and peas, eggs, processed soy products (like tofu), nuts (including nut butters), and seeds. Make protein choices no more than a quarter of your plate. Meat and poultry choices should be lean or low fat.    Dairy: All  fluid milk products and foods made from milk that contain calcium, like yogurt and cheese are part of the Dairy Group. (Foods that have little calcium, such as cream, butter, and cream cheese, are not part of the group.) Most dairy choices should be low-fat or fat-free.    Oils: These are fats that are liquid at room temperature. They include canola, corn, olive, soybean, and sunflower oil. Foods that are mainly oil include mayonnaise, certain salad dressings, and soft margarines. You should have only 5 to 7 teaspoons of oils a day. You probably already get this much from the food you eat.  Use Veracode to Help Build Your Meals  The [employer] can help you plan and track your meals and activity. You can look up individual foods to see or compare their nutritional value. You can get guidelines for what and how much you should eat. You can compare your food choices. And you can assess personal physical activities and see ways you can improve. Go to www.[employer].gov/bfinance UKcker/.    5079-0710 [employer]. [address] [place] [address]. All rights reserved. This information is not intended as a substitute for professional medical care. Always follow your healthcare professional's instructions.           Patient Education   Understanding Advance Care Planning  Advance care planning is the process of deciding ones own future medical care. It helps ensure that if you cant speak for yourself, your wishes can still be carried out. The plan is a series of legal documents that note a persons wishes. The documents vary by state. Advance care planning may be done when a person has a serious illness that is expected to get worse. It may be done before major surgery. And it can help you and your family be prepared in case of a major illness or injury. Advance care planning helps with making decisions at these times.       A health care proxy is a person who acts as the voice of a patient when  the patient cant speak for himself or herself. The name of this role varies by state. It may be called a Durable Medical Power of  or Durable Power of  for Healthcare. It may be called an agent, surrogate, or advocate. Or it may be called a representative or decision maker. It is an official duty that is identified by a legal document. The document also varies by state.    Why Is Advance Care Planning Important?  If a person communicates their healthcare wishes:    They will be given medical care that matches their values and goals.    Their family members will not be forced to make decisions in a crisis with no guidance.  Creating a Plan  Making an advance care plan is often done in 3 steps:    Thinking about ones wishes. To create an advance care plan, you should think about what kind of medical treatment you would want if you lose the ability to communicate. Are there any situations in which you would refuse or stop treatment? Are there therapies you would want or not want? And whom do you want to make decisions for you? There are many places to learn more about how to plan for your care. Ask your doctor or  for resources.    Picking a health care proxy. This means choosing a trusted person to speak for you only when you cant speak for yourself. When you cannot make medical decisions, your proxy makes sure the instructions in your advance care plan are followed. A proxy does not make decisions based on his or her own opinions. They must put aside those opinions and values if needed, and carry out your wishes.    Filling out the legal documents. There are several kinds of legal documents for advance care planning. Each one tells health care providers your wishes. The documents may vary by state. They must be signed and may need to be witnessed or notarized. You can cancel or change them whenever you wish. Depending on your state, the documents may include a Healthcare Proxy form,  Living Will, Durable Medical Power of , Advance Directive, or others.  The Familys Role  The best help a family can give is to support their loved ones wishes. Open and honest communication is vital. Family should express any concerns they have about the patients choices while the patient can still make decisions.    6548-6968 [employer]. [address] [place] [address]. All rights reserved. This information is not intended as a substitute for professional medical care. Always follow your healthcare professional's instructions.         Also, [facility] [employer] [place] offers a free, downloadable health care directive that allows you to share your treatment choices and personal preferences if you cannot communicate your wishes. It also allows you to appoint another person (called a health care agent) to make health care decisions if you are unable to do so. You can download an advance directive by going here: [email or web address]     Patient Education   Personalized Prevention Plan  You are due for the preventive services outlined below.  Your care team is available to assist you in scheduling these services.  If you have already completed any of these items, please share that information with your care team to update in your medical record.  Health Maintenance   Topic Date Due   ? HEPATITIS C SCREENING  1945   ? MEDICARE ANNUAL WELLNESS VISIT  03/28/2018   ? PNEUMOCOCCAL IMMUNIZATION 65+ LOW/MEDIUM RISK (2 of 2 - PPSV23) 03/28/2018   ? DXA SCAN  04/05/2019   ? TD 18+ HE  05/20/2019   ? INFLUENZA VACCINE RULE BASED (1) 08/01/2019   ? FALL RISK ASSESSMENT  04/10/2020   ? MAMMOGRAM  09/05/2020   ? LIPID  03/28/2022   ? ADVANCE CARE PLANNING  03/28/2022   ? COLONOSCOPY  Discontinued   ? ZOSTER VACCINES  Discontinued

## 2021-06-17 NOTE — TELEPHONE ENCOUNTER
Refill Approved    Rx renewed per Medication Renewal Policy. Medication was last renewed on 11/19/20.    Alexx Brumfield, [facility] Triage/Med Refill 5/25/2021     Requested Prescriptions   Pending Prescriptions Disp Refills     levothyroxine (SYNTHROID, LEVOTHROID) 25 MCG tablet [Pharmacy Med Name: Levothyroxine Sodium Oral Tablet 25 MCG] 90 tablet 0     Sig: TAKE ONE TABLET BY MOUTH ONE TIME DAILY       Thyroid Hormones Protocol Passed - 5/24/2021  8:57 AM        Passed - Provider visit in past 12 months or next 3 months     Last office visit with prescriber/PCP: 9/28/2020 Monica Carias MD OR same dept: 9/28/2020 Monica Carias MD OR same specialty: 9/28/2020 Monica Carias MD  Last physical: 4/6/2021 Last MTM visit: Visit date not found   Next visit within 3 mo: Visit date not found  Next physical within 3 mo: Visit date not found  Prescriber OR PCP: Monica Carias MD  Last diagnosis associated with med order: 1. Hypothyroidism, unspecified type  - levothyroxine (SYNTHROID, LEVOTHROID) 25 MCG tablet [Pharmacy Med Name: Levothyroxine Sodium Oral Tablet 25 MCG]; TAKE ONE TABLET BY MOUTH ONE TIME DAILY   Dispense: 90 tablet; Refill: 0    If protocol passes may refill for 12 months if within 3 months of last provider visit (or a total of 15 months).             Passed - TSH on file in past 12 months for patient age 12 & older     TSH   Date Value Ref Range Status   04/06/2021 4.01 0.30 - 5.00 uIU/mL Final

## 2021-06-18 NOTE — PATIENT INSTRUCTIONS - HE
Patient Instructions by Monica Carias MD at 4/6/2021 11:30 AM     Author: Monica Carias MD Service: -- Author Type: Physician    Filed: 4/6/2021 11:06 AM Encounter Date: 4/6/2021 Status: Signed    : Monica Carias MD (Physician)         Patient Education   Understanding USDA MyPlate  The USDA (US Department of Agriculture) has guidelines to help you make healthy food choices. These are called MyPlate. MyPlate shows the food groups that make up healthy meals using the image of a place setting. Before you eat, think about the healthiest choices for what to put onto your plate or into your cup or bowl. To learn more about building a healthy plate, visit www.choosemyplate.gov.       The Food Groups    Fruits: Any fruit or 100% fruit juice counts as part of the Fruit Group. Fruits may be fresh, canned, frozen, or dried, and may be whole, cut-up, or pureed. Make half your plate fruits and vegetables.    Vegetables: Any vegetable or 100% vegetable juice counts as a member of the Vegetable Group. Vegetables may be fresh, frozen, canned, or dried. They can be served raw or cooked and may be whole, cut-up, or mashed. Make half your plate fruits and vegetables.     Grains: All foods made from grains are part of the Grains Group. These include wheat, rice, oats, cornmeal, and barley such as bread, pasta, oatmeal, cereal, tortillas, and grits. Grains should be no more than a quarter of your plate. At least half of your grains should be whole grains.    Protein: This group includes meat, poultry, seafood, beans and peas, eggs, processed soy products (like tofu), nuts (including nut butters), and seeds. Make protein choices no more than a quarter of your plate. Meat and poultry choices should be lean or low fat.    Dairy: All fluid milk products and foods made from milk that contain calcium, like yogurt and cheese are part of the Dairy Group. (Foods that have little calcium, such as cream, butter, and  cream cheese, are not part of the group.) Most dairy choices should be low-fat or fat-free.    Oils: These are fats that are liquid at room temperature. They include canola, corn, olive, soybean, and sunflower oil. Foods that are mainly oil include mayonnaise, certain salad dressings, and soft margarines. You should have only 5 to 7 teaspoons of oils a day. You probably already get this much from the food you eat.  Use Xercise4lesser to Help Build Your Meals  The Adjugcker can help you plan and track your meals and activity. You can look up individual foods to see or compare their nutritional value. You can get guidelines for what and how much you should eat. You can compare your food choices. And you can assess personal physical activities and see ways you can improve. Go to www.IPTE[place].gov/Optracecker/.    3625-2482 [facility]. [address] [place] [address]. All rights reserved. This information is not intended as a substitute for professional medical care. Always follow your healthcare professional's instructions.           Patient Education   Understanding Advance Care Planning  Advance care planning is the process of deciding ones own future medical care. It helps ensure that if you cant speak for yourself, your wishes can still be carried out. The plan is a series of legal documents that note a persons wishes. The documents vary by state. Advance care planning may be done when a person has a serious illness that is expected to get worse. It may be done before major surgery. And it can help you and your family be prepared in case of a major illness or injury. Advance care planning helps with making decisions at these times.       A health care proxy is a person who acts as the voice of a patient when the patient cant speak for himself or herself. The name of this role varies by state. It may be called a Durable Medical Power of  or Durable Power of  for  Healthcare. It may be called an agent, surrogate, or advocate. Or it may be called a representative or decision maker. It is an official duty that is identified by a legal document. The document also varies by state.    Why Is Advance Care Planning Important?  If a person communicates their healthcare wishes:    They will be given medical care that matches their values and goals.    Their family members will not be forced to make decisions in a crisis with no guidance.  Creating a Plan  Making an advance care plan is often done in 3 steps:    Thinking about ones wishes. To create an advance care plan, you should think about what kind of medical treatment you would want if you lose the ability to communicate. Are there any situations in which you would refuse or stop treatment? Are there therapies you would want or not want? And whom do you want to make decisions for you? There are many places to learn more about how to plan for your care. Ask your doctor or  for resources.    Picking a health care proxy. This means choosing a trusted person to speak for you only when you cant speak for yourself. When you cannot make medical decisions, your proxy makes sure the instructions in your advance care plan are followed. A proxy does not make decisions based on his or her own opinions. They must put aside those opinions and values if needed, and carry out your wishes.    Filling out the legal documents. There are several kinds of legal documents for advance care planning. Each one tells health care providers your wishes. The documents may vary by state. They must be signed and may need to be witnessed or notarized. You can cancel or change them whenever you wish. Depending on your state, the documents may include a Healthcare Proxy form, Living Will, Durable Medical Power of , Advance Directive, or others.  The Familys Role  The best help a family can give is to support their loved ones wishes. Open and  honest communication is vital. Family should express any concerns they have about the patients choices while the patient can still make decisions.    0413-8386 [employer]. [address] [place] [address], [place] [address]. All rights reserved. This information is not intended as a substitute for professional medical care. Always follow your healthcare professional's instructions.         Also, [facility] offers a free, downloadable health care directive that allows you to share your treatment choices and personal preferences if you cannot communicate your wishes. It also allows you to appoint another person (called a health care agent) to make health care decisions if you are unable to do so. You can download an advance directive by going here: [email or web address]     Patient Education   Personalized Prevention Plan  You are due for the preventive services outlined below.  Your care team is available to assist you in scheduling these services.  If you have already completed any of these items, please share that information with your care team to update in your medical record.  Health Maintenance Due   Topic Date Due   ? HEPATITIS C SCREENING  Never done   ? COVID-19 Vaccine (1) Never done   ? FALL RISK ASSESSMENT  12/10/2020

## 2021-06-18 NOTE — PATIENT INSTRUCTIONS - HE
Patient Instructions by Monica Carias MD at 8/7/2020  2:30 PM     Author: Monica Carias MD Service: -- Author Type: Physician    Filed: 8/7/2020  2:56 PM Encounter Date: 8/7/2020 Status: Signed    : Monica Carias MD (Physician)       Fiber!     Too long, didn't read summary:  - all fiber is not the same  - psyllium is the best to lower blood sugar and cholesterol, it also regulates bowel movements  - pretty much everyone should take it      If you want the science and more detail, keep reading:    There is general agreement with the statement that fiber is good for you. There is also general agreement that most people do not consume enough fiber in their diet and therefore would benefit from eating more fiber. Both statements are true when applied to fiber-rich whole foods (eg, fruits, vegetables, legumes, whole grains). The statements become less accurate, and less broadly applicable, when applied to fiber supplements.     If patients are compliant with their clinicians advice to change their eating habits, and a substantial portion of a given diet is replaced by high-fiber foods, then both the total calories consumed and the glycemic index of the diet should be significantly reduced.     However, many patients struggle to alter diet significantly. So how should one choose a fiber supplement?    There are 4 kinds:    1. Insoluble fiber, like wheat bran:   a. it has no significant laxative effect  b. insoluble fiber does not create a gel or alter viscosity and thus does not provide other (gel-dependent) fiber health benefits.  2. Soluble, nonviscous, readily fermented (eg, inulin, wheat dextrin, oligosaccharides, resistant starches):   a. dissolves in water; no increase in viscosity  b. rapidly and completely fermented (once fermented, the fiber is no longer present in stool, so you get rapid gas formation, increased flatulence, calorie uptake  c. may alter the numbers of specific  bacteria in the gut (eg, prebiotic effect)  d. no laxative effect at physiologic doses  e. does not gel or alter viscosity and thus does not provide any of the gel-dependent fiber health benefits. Readily fermented fibers are part of an emerging area of science relating to their effects on the gut microbiome, but to date, the marketed fiber supplements have no established clinically meaningful health benefits.  3. Soluble viscous/gel forming, readily fermented (eg, ?-glucan [oats, barley], raw guar gum):   a. dissolves in water, forms a viscous gel (eg, oatmeal),   b. improves glycemic control, improves blood sugars  c. lowers elevated serum cholesterol  d. readily fermented (causes flatulence and calorie uptake)   e. no significant laxative effect and no retained gel to attenuate diarrhea.  4. Soluble viscous/gel forming, nonfermented (ie, psyllium):   a. dissolves in water, forms a viscous gel  b. improves glycemic control (improves blood sugars)  c. lowers elevated serum cholesterol  d. not fermented (no gas production, no calorie harvest from fermentation by-products); because it is not fermented, it remains gelled throughout the large bowel, providing a  stool-normalizing effect: softens hard stool in constipation (relieves/prevents constipation) and firms/forms loose/liquid stool in diarrhea (relieves/prevents diarrhea), and normalizes stool form in IBS.    Of all the choices, Psyllium husk (or Metamucil) is the best for overall health benefits.     Psyllium can help with appetite control and improve blood sugar:  1. Gelling fiber can delay the absorption of nutrients long enough to deliver nutrients to the distal ileum (end of the small intestine), where they are not normally present.   2. Nutrients delivered to the distal ileum stimulate release of glucagon-like peptide-1 into the blood stream.   3. Glucagon-like peptide-1 significantly decreases appetite, increases insulin secretion, decreases glucagon  secretion (a peptide that stimulates glucose production in the liver), increases pancreatic ?-cell growth (cells that produce insulin), improves insulin production and sensitivity, and slows gastric emptying and small bowel transit via a feedback loop called the ileal brake phenomenon.    Psyllium can help lower cholesterol:  1. Lowering elevated serum cholesterol concentrations is also a gel-dependent phenomenon in the small bowel, and the effectiveness of cholesterol lowering is highly correlated with the viscosity of the gel-forming fiber:   2. The higher the viscosity of a gel-forming fiber is, the greater the effect on lowering elevated cholesterol concentrations.   3. When bile is trapped in a gel-forming fiber and eliminated via stool, the liver must produce more bile to meet digestive needs. Cholesterol is a component of bile, and the liver uses serum stores of cholesterol to generate more bile, effectively lowering serum LDL-cholesterol and total cholesterol concentration, without affecting good cholesterol.         https://www.ncbi.nlm.nih.gov/pmc/articles/MNW8189845/

## 2021-06-19 NOTE — LETTER
Letter by Jamshid Lala MD at      Author: Jamshid Lala MD Service: -- Author Type: --    Filed:  Encounter Date: 4/24/2019 Status: (Other)         Iesha Patel  [address] [facility] [address]             April 24, 2019         Dear Ms. Patel,    Below are the results from your recent visit: As discussed, there are 2 issues going on with the thyroid, there is a thyroid nodule that could you need follow-up on but the more immediate issue is inflammation of the thyroid called thyroiditis, and this is causing the thyroid to be presumably both at times overactive and underactive and does explain your symptoms.  Endocrinology will do a further work-up to determine cause and more importantly treatment.    Resulted Orders   US Thyroid    Narrative    EXAM: US THYROID  LOCATION: [facility]  DATE/TIME: 4/23/2019 9:08 AM    INDICATION: Patient has a suppressed TSH, she is on no thyroid replacement but she also has the presence of thyroid antibodies and has significant fatigue.  COMPARISON: None.  TECHNIQUE: Routine.    FINDINGS:  RIGHT thyroid lobe measures 4.5 x 1.7 x 1.7 cm. Pronounced diffuse heterogeneity of right lobe parenchyma.      LEFT thyroid lobe measures 5.2 x 2.3 x 2 cm. Very pronounced diffuse heterogeneity with a suggestion of numerous tiny nodules. No significant sized nodules and no normal glandular tissue evident.    Isthmus is diffusely heterogeneous with one dominant nodule.  Nodule: 2.2 x 1.8  Composition: Solid or almost completely solid 2   Echogenicity: Hyperechoic or isoechoic 1   Shape: Wider-than-tall 0   Margin: Smooth 0   Echogenic Foci: None, or large comet-tail artifacts 0   Point Total: 3 points. TI-RADS 3. FNA if >=2.5 cm. Follow if >=1.5 cm.      Impression    CONCLUSION:  1.  Low suspicion TI RADS 3 nodule within the isthmus warrants follow-up but not FNA. Suggest a follow-up exam in one year.  2.  Diffuse heterogeneity of thyroid parenchyma most suggestive of  chronic thyroiditis.    TI-RADS 1. No FNA.  TI-RADS 2. No FNA.  TI-RADS 3. FNA if >=2.5 cm. Follow up ultrasound in 1 year >=1.5 cm.  TI-RADS 4. FNA if >=1.5cm. Follow up ultrasound in 1 year >=1 cm.  TI-RADS 5. FNA if >=1 cm. Follow up ultrasound in 1 year >=0.5 cm.    -FNA no more than 2 of the most suspicious nodules, if FNA indicated.  -Follow no more than 4 of the most suspicious nodules.    ACR Thyroid Imaging, Reporting and Data System (TI-RADS): White Paper of the ACR TI-RADS Committee  Chris Roy. et al. Journal of the American College of Radiology 2017. Volume 14 (2017), Issue 5, 587-595.            Please call with questions or contact us using [employer].    Sincerely,        Electronically signed by Jamshid Lala MD

## 2021-06-19 NOTE — PROGRESS NOTES
ASSESSMENT:  1. Hypothyroidism  Despite being on a low 25 mcg dose, patient still has a low TSH suggesting that she is still getting too much thyroid medication.  This raises the suspicion that perhaps the patient's underlying thyroid dysfunction has resolved or perhaps those other reasons for this.  - Thyroid Cascade; Future    2. Atrial fibrillation (H)  Now resolved in sinus rhythm she is off her blood thinners.    3. Low back pain  Patient had surgery in February.  She is now walking and is having very little pain, was on gabapentin and other medications for this.    4. Insomnia  Long-standing issue gabapentin has helped but she would like to minimize medications.      PLAN:  1.  Patient will hold her 25 mcg dose of levothyroxine.  2.  Patient will have her thyroid cascade rechecked in mid September and then see me shortly after that.  3.  Patient is going to try to completely wean off the gabapentin.  4.  She will take trazodone only as needed for sleep.  5.  We also reviewed her medication list to narrow down to only those medication she is taking her needs to take.  6.  For atrial fibrillation.  The patient is using only aspirin, she would use metoprolol only as needed but is off blood thinners.  7.  Follow-up shortly after labs as above in mid September.    Orders Placed This Encounter   Procedures     Thyroid Cascade     Standing Status:   Future     Standing Expiration Date:   7/25/2019     Medications Discontinued During This Encounter   Medication Reason     rivaroxaban 20 mg Tab Therapy completed     rivaroxaban 20 mg Tab Therapy completed     metoprolol succinate (TOPROL-XL) 50 MG 24 hr tablet Therapy completed     magnesium hydroxide (FOLEY MILK OF MAGNESIA) 311 MG Chew Therapy completed     gabapentin (NEURONTIN) 300 MG capsule Reorder     tiZANidine (ZANAFLEX) 2 MG tablet Therapy completed     CHADWICK FLAVOR, BULK, MISC Therapy completed     melatonin 1 mg Tab tablet Therapy completed      MULTIVITAMIN ORAL Therapy completed       No Follow-up on file.    CHIEF COMPLAINT:  Chief Complaint   Patient presents with     Medication Management     med check        SUBJECTIVE:  Iesha is a 72 y.o. female is in for follow-up multiple issues.  Patient with a diagnosis of hypothyroidism however since her surgery may be slightly before we have been decreasing the dose of her thyroid replacement was on 75 and 50 now 25 mcg and even with this her TSH is actually low, explained to the patient that this would indicate actually that she is getting too much thyroid, it is possible that her underlying thyroid has corrected itself there may have been some underlying infection or other process causing alteration in thyroid function or perhaps medications or some other cause but in either case I am not sure she actually needs to be on thyroid medication at this time.    She is also status post low back surgery in February, she is walking up to 2 miles a day and is not having significant pain but she was on gabapentin initially up to 3 times a day now twice a day and she is actually recently cut back to once a day but she would like to get off of this.    She also has a long-standing history of insomnia and actually gabapentin does seem to help this.  But she would prefer to be on fewer rather than more pills and therefore she is considering getting off of gabapentin and using trazodone only as needed.    History of atrial fibrillation she has seen cardiology.  The atrial fibrillation happened after surgery and felt secondary to that.  Cardiology has now gotten her off of her blood thinner and metoprolol.  She does stay on aspirin at least for now.  She has metoprolol in case she does have an episode of atrial fibrillation.      REVIEW OF SYSTEMS:      All other systems are negative.    PFSH:  Immunization History   Administered Date(s) Administered     DT (pediatric) 02/07/2006     Hep A, Adult IM (19yr & older)  08/26/2010, 08/11/2011     Hep A, historic 08/26/2010, 08/11/2011     Influenza high dose, seasonal 10/17/2017     Pneumo Conj 13-V (2010&after) 03/28/2017     Td, Adult, Absorbed 05/20/2009     Td,adult,historic,unspecified 05/20/2009     Social History     Social History     Marital status:      Spouse name: N/A     Number of children: 3     Years of education: N/A     Occupational History      Retired     Social History Main Topics     Smoking status: Never Smoker     Smokeless tobacco: Never Used     Alcohol use No     Drug use: No     Sexual activity: Not on file     Other Topics Concern     Not on file     Social History Narrative    Diet- Regular        Exercise- Now nothing, walking in past     Past Medical History:   Diagnosis Date     Heart murmur 03/28/2017    Soft systolic murmur noted on exam     Onychocryptosis 02/22/2012     Family History   Problem Relation Age of Onset     Cancer Brother      testicular, bladder, kidney, skin     Diabetes type II Brother      Prostate cancer Brother      Heart disease Brother 59     heart attack      Valvular heart disease Mother      Diabetes type II Mother      Stroke Father      Diabetes Brother      Cancer Brother      Allergies Brother        MEDICATIONS:  Current Outpatient Prescriptions   Medication Sig Dispense Refill     aspirin 81 MG EC tablet 81 mg daily.       gabapentin (NEURONTIN) 300 MG capsule Take one at night 90 capsule 5     levothyroxine (SYNTHROID, LEVOTHROID) 25 MCG tablet Take 1 tablet (25 mcg total) by mouth daily. 90 tablet 1     MAGNESIUM HYDROXIDE (FOLEY MILK OF MAGNESIA ORAL) Take 1 capsule by mouth daily.       POLYETHYLENE GLYCOL 3350 (MIRALAX ORAL) Powder.       RESTASIS MULTIDOSE 0.05 % Drop        traZODone (DESYREL) 50 MG tablet Take one to two at night as needed for sleep 60 tablet 2     No current facility-administered medications for this visit.        TOBACCO USE:  History   Smoking Status     Never Smoker    Smokeless Tobacco     Never Used       VITALS:  Vitals:    07/25/18 0816   BP: 116/60   Pulse: 66   Weight: 152 lb (68.9 kg)     Wt Readings from Last 3 Encounters:   07/25/18 152 lb (68.9 kg)   03/12/18 142 lb (64.4 kg)   02/13/18 151 lb (68.5 kg)       PHYSICAL EXAM:  Constitutional:   Reveals a male who appears overall healthy younger than her stated age..  Vitals: per nursing notes.  Musculoskeletal: No peripheral swelling.  Neuro:  Alert and oriented. Cranial nerves, motor, sensory exams are intact.  No gross focal deficits.  Psychiatric:  Memory intact, mood appropriate.    QUALITY MEASURES:      DATA REVIEWED:

## 2021-06-19 NOTE — LETTER
Letter by Jamshid Lala MD at      Author: Jamshid Lala MD Service: -- Author Type: --    Filed:  Encounter Date: 7/26/2019 Status: (Other)         Iesha Patel  [address] [facility] [address]             July 26, 2019         Dear Ms. Patel,    Our records show that you are due for a colonoscopy.  We do want to inform you that there are a couple of other options besides the traditional colonoscopy that we offer.  If you would like to do the traditional colonoscopy, please contact a [facility] near you according to the card enclosed.  If you would like to do one of the other options available to you, please let you primary doctor know and they will get that ordered.  Enclosed is some information on the other options for you to read over.  If you have had any of these done at another facility, please arrange for us to receive those records so we can update your chart.    Please call with questions or contact us using [employer].    Sincerely,        Electronically signed by Jamshid Lala MD

## 2021-06-19 NOTE — LETTER
Letter by Jamshid Lala MD at      Author: Jamshid Lala MD Service: -- Author Type: --    Filed:  Encounter Date: 4/12/2019 Status: (Other)         Iesha Patel  [address] [facility] [address]             April 12, 2019         Dear Ms. Patel,    Below are the results from your recent visit: The thyroid studies show a mixed picture.  On the one hand the low TSH would indicate hyperthyroidism, though the free T4 is normal, she usually elevated in the setting of overt hyperthyroidism.  The thyroid peroxidase is generally elevated in the setting of hypothyroidism, however given the picture I would not put you on thyroid medication at this time.  I do not have a clear explanation as to exactly what is going on, but you clearly do need further evaluation of your thyroid, and I do believe that the thyroid not functioning normally is causing the symptoms that you are having.    I talked to my specialty , I do not want you to wait until June to be seen, rather we will try to facilitate you getting into endocrinology as soon as possible.    Resulted Orders   Thyroid Stimulating Hormone (TSH)   Result Value Ref Range    TSH 0.02 (L) 0.30 - 5.00 uIU/mL   T4, Free   Result Value Ref Range    Free T4 0.9 0.7 - 1.8 ng/dL   Thyroid Peroxidase Antibody   Result Value Ref Range    Thyroid Peroxidase Ab >2,000.0 (H) 0.0 - 5.6 IU/mL            Please call with questions or contact us using [employer].    Sincerely,        Electronically signed by Jamshid Lala MD

## 2021-06-19 NOTE — LETTER
Letter by Jamshid Lala MD at      Author: Jamshid Lala MD Service: -- Author Type: --    Filed:  Encounter Date: 5/31/2019 Status: (Other)         Iesha Patel  [address] [facility] [address]             May 31, 2019         Dear Ms. Patel,    Below are the results from your recent visit: Sugars good at 81, no diabetes.  Kidney tests are normal.  Blood counts are normal, you are not anemic at this time.  Vitamin B12 level is normal.  Ferritin which is a marker of iron stores is also normal.    The only elder male TSH or thyroid test, this would indicate the value being slightly low that your thyroid is currently overactive, but I would not do anything at this time other than watchful waiting.    I would like to see you in the next 4 to 6 weeks after you have had the echocardiogram, to further discuss.    Resulted Orders   Basic Metabolic Panel   Result Value Ref Range    Sodium 137 136 - 145 mmol/L    Potassium 4.6 3.5 - 5.0 mmol/L    Chloride 100 98 - 107 mmol/L    CO2 26 22 - 31 mmol/L    Anion Gap, Calculation 11 5 - 18 mmol/L    Glucose 81 70 - 125 mg/dL    Calcium 9.8 8.5 - 10.5 mg/dL    BUN 14 8 - 28 mg/dL    Creatinine 0.78 0.60 - 1.10 mg/dL    GFR MDRD Af Amer >60 >60 mL/min/1.73m2    GFR MDRD Non Af Amer >60 >60 mL/min/1.73m2    Narrative    Fasting Glucose reference range is 70-99 mg/dL per  American Diabetes Association (ADA) guidelines.   HM2(CBC w/o Differential)   Result Value Ref Range    WBC 6.0 4.0 - 11.0 thou/uL    RBC 4.40 3.80 - 5.40 mill/uL    Hemoglobin 13.2 12.0 - 16.0 g/dL    Hematocrit 39.7 35.0 - 47.0 %    MCV 90 80 - 100 fL    MCH 29.9 27.0 - 34.0 pg    MCHC 33.2 32.0 - 36.0 g/dL    RDW 12.7 11.0 - 14.5 %    Platelets 226 140 - 440 thou/uL    MPV 6.4 (L) 7.0 - 10.0 fL   Ferritin   Result Value Ref Range    Ferritin 33 10 - 130 ng/mL   Vitamin B12   Result Value Ref Range    Vitamin B-12 700 213 - 816 pg/mL   Thyroid Stimulating Hormone (TSH)   Result Value Ref Range     TSH 0.08 (L) 0.30 - 5.00 uIU/mL            Please call with questions or contact us using [email or web address].    Sincerely,        Electronically signed by Jamshid Lala MD

## 2021-06-19 NOTE — LETTER
Letter by Jamshid Lala MD at      Author: Jamshid Lala MD Service: -- Author Type: --    Filed:  Encounter Date: 6/18/2019 Status: (Other)         Iesha Patel  [address] [facility] [address]             June 18, 2019         Dear Ms. Patel,    Below are the results from your recent visit: For all intents and purposes, the echocardiogram the heart is essentially normal, I am pleased that simply holding 1 your medications the metoprolol has made such a big difference in that your overall feeling much better.    Resulted Orders   Echo Complete   Result Value Ref Range    BSA 1.81 m2    Hieght 68 in    Weight 2,416 lbs    /70 mmHg    HR 76 bpm    Height 68.0 in    Weight 151 lbs    LV volume diastolic 54 46 - 106 cm3    LV volume systolic 9 (!) 14 - 42 cm3    IVSd 0.644 0.6 - 0.9 cm    LVIDd 4.68 3.8 - 5.2 cm    LVIDs 2.92 2.2 - 3.5 cm    LVOT diam 2 cm    LVOT mean gradient 2 mmHg    LVOT peak VTI 20.7 cm    LVOT mean evelin 68.4 cm/s    LVOT peak evelin 109 cm/s    LVOT peak gradient 5 mmHg    LV PWd 0.697 0.6 - 0.9 cm    MV E' lat evelin 11.2 cm/s    MV E' med evelin 6.63 cm/s    AV mean evelin 183 cm/s    AV mean gradient 15 mmHg    AV VTI 51.7 cm    AV peak evelin 245 cm/s    AO root 2.9 cm    LA size 2.8 cm    LA/AO root ratio 0.966 no units    MV decel time 254 ms    MV peak A evelin 88.8 cm/s    MV peak E evelin 73.1 cm/s    LA area 1 9.58 cm2    LA length 3.99 cm    TAPSE 2.3 cm    IVS/PW ratio 0.9     LV FS 37.6 28 - 44 %    Echo LVEF calculated 83 55 - 75 %    LV mass 96.9 g    AV area 1.3 cm2    AV DIM IND evelin 0.4     MV E/A Ratio 0.8     LVOT area 3.14 cm2    LVOT SV 65.0 cm3    AV peak gradient 24.0 mmHg    LV systolic volume index 5.0 8 - 24 cm3/m2    LV diastolic volume index 29.8 29 - 61 cm3/m2    LV mass index 53.5 g/m2    LV SVi 35.9 ml/m2    MV med E/e' ratio 11.0     MV lat E/e' ratio 6.5     LV CO 4.9 l/min    LV Ci 2.7 l/min/m2    MV Avg E/e' Ratio 8.2 cm/s    AV DIM IND VTI 0.4     Narrative       When compared to the previous study dated 4/7/2017, no significant   change.    Normal left ventricular size, wall thickness and wall motion. Left   ventricle ejection fraction is normal. The calculated left ventricular   ejection fraction is 83%.    Normal right ventricular size and systolic function.    Aortic valve is not well visualized. It appears tricuspid aortic valve   with mild aortic valve stenosis. No regurgitation.               Please call with questions or contact us using [email or web address].    Sincerely,        Electronically signed by Jamshid Lala MD

## 2021-06-20 NOTE — LETTER
Letter by Jamshid Lala MD at      Author: Jamshid Lala MD Service: -- Author Type: --    Filed:  Encounter Date: 3/24/2020 Status: (Other)         Iesha Patel  [address]  [facility] [address]             March 24, 2020         Dear Ms. Jorge,    Below are the results from your recent visit: As discussed, the CT scan is essentially normal were not finding any specific cause.  Laboratory studies were also normal.  Continue the same treatments that you are doing, it is a potential future option to see gastroenterology let me know.    Resulted Orders   CT Abdomen Pelvis With Oral With IV Contrast    Narrative    EXAM: CT ABDOMEN PELVIS W ORAL W IV CONTRAST  LOCATION: [facility]  DATE/TIME: 3/24/2020 2:41 PM    INDICATION: Upper abdominal fullness. Nausea and constipation.  COMPARISON: 02/05/2017.  TECHNIQUE: CT scan of the abdomen and pelvis was performed following injection of IV contrast. Multiplanar reformats were obtained. Dose reduction techniques were used.  CONTRAST: 100 mL Omnipaque 350.    FINDINGS:   LOWER CHEST: Minimal basilar cylindrical bronchiectasis.    HEPATOBILIARY: Mild biliary prominence is presumed from post cholecystectomy state. No focal liver abnormality.    PANCREAS: Normal.    SPLEEN: Normal.    ADRENAL GLANDS: Normal.    KIDNEYS/BLADDER: Normal.    BOWEL: Stomach decompressed without overt wall thickening. Mild diffuse colonic stool. No bowel dilatation or significant wall thickening.    LYMPH NODES: No adenopathy.    VASCULATURE: Nonaneurysmal aorta. Trace atherosclerosis.    MUSCULOSKELETAL: Bony demineralization and degenerative changes. Surgical changes lumbosacral spine from L4 through S1. Left hip arthroplasty. Small fat-containing upper abdominal ventral hernia (series 2, image 68).      Impression    1.  Mild diffuse colonic stool. No obstruction. No diverticulitis.    2.  No free air, fluid or abscess.                  Please call with  questions or contact us using [email or web address].    Sincerely,        Electronically signed by Jamshid Lala MD

## 2021-06-20 NOTE — PROGRESS NOTES
ASSESSMENT:  1. Hypothyroidism  Issue has resolved, patient has been off thyroid replacement and yet has a normal TSH and is asymptomatic.  Perhaps she had an earlier infection or inflammation of the thyroid.  Remission    2. Endometrial cancer (H)  Admission.    3. Heart murmur  Noted in the past, asymptomatic probably some mild insufficiency.    4. Insomnia  Patient has trouble with sleep onset and to a greater extent sleep maintenance has not responded well to insomnia medications, I think her patterns are physiologic and unlikely to respond to medication.      PLAN:  1.  High-dose influenza vaccine.  2.  For now, keep the patient off of any thyroid medication.  3.  The patient is on essentially over-the-counter medications but also low-dose aspirin because of atrial fibrillation.  4.  Patient will be due for a Medicare wellness examination sometime spring or summer 2019.    Orders Placed This Encounter   Procedures     Influenza High Dose, Seasonal 65+ yrs     Medications Discontinued During This Encounter   Medication Reason     gabapentin (NEURONTIN) 300 MG capsule      traZODone (DESYREL) 50 MG tablet Therapy completed       No Follow-up on file.    CHIEF COMPLAINT:  Chief Complaint   Patient presents with     Follow-up     thyroid- off all rx and feels great      Insomnia     rx does not help- talk about another rx        SUBJECTIVE:  Iesha is a 73 y.o. female who presents for hypothyroid follow up. She has been holing her levothyroxine since 7/25/18. Normal thyroid cascade 9/17/18. She has been feeling great off the medication.     Insomnia: She continues to struggle with sleep. She often wakes up during the middle of the night and cannot fall back asleep. She is able to fall asleep easily. She is only able to sleep for 6-7 hours a night before she wakes up. Trazodone has not been working well for her. After she takes trazodone, she experiences nasal congestion, mouth dryness, and dizziness upon standing.  It does not help her fall back asleep. She has been able to survive on 6-7 hours of sleep every night with a few naps.     Weight Gain: She is walking 2 miles a day, 5 days a week. She is surprised to see weight gain. She is no longer taking gabapentin, which she suspects contributed to her weight gain.     Health Maintenance: She would like a flu shot today. The flu shot makes her feel miserable but she continues to get these yearly. Two years ago, she had the flu with pneumonia.     REVIEW OF SYSTEMS:   She takes a daily aspirin. She has been doing well post back surgery.   All other systems are negative.    PFS:  Immunization History   Administered Date(s) Administered     DT (pediatric) 02/07/2006     Hep A, Adult IM (19yr & older) 08/26/2010, 08/11/2011     Hep A, historic 08/26/2010, 08/11/2011     Influenza high dose, seasonal 10/17/2017, 09/19/2018     Pneumo Conj 13-V (2010&after) 03/28/2017     Td, Adult, Absorbed 05/20/2009     Td,adult,historic,unspecified 05/20/2009     Social History     Social History     Marital status:      Spouse name: N/A     Number of children: 3     Years of education: N/A     Occupational History      Retired     Social History Main Topics     Smoking status: Never Smoker     Smokeless tobacco: Never Used     Alcohol use No     Drug use: No     Sexual activity: Not on file     Other Topics Concern     Not on file     Social History Narrative    Diet- Regular        Exercise- Now nothing, walking in past     History reviewed. No pertinent past medical history.  Family History   Problem Relation Age of Onset     Cancer Brother      testicular, bladder, kidney, skin     Diabetes type II Brother      Prostate cancer Brother      Heart disease Brother 59     heart attack      Valvular heart disease Mother      Diabetes type II Mother      Stroke Father      Diabetes Brother      Cancer Brother      Allergies Brother        MEDICATIONS:  Current Outpatient  Prescriptions   Medication Sig Dispense Refill     aspirin 81 MG EC tablet 81 mg daily.       MAGNESIUM HYDROXIDE (FOLEY MILK OF MAGNESIA ORAL) Take 1 capsule by mouth daily.       POLYETHYLENE GLYCOL 3350 (MIRALAX ORAL) Powder.       RESTASIS MULTIDOSE 0.05 % Drop        No current facility-administered medications for this visit.        TOBACCO USE:  History   Smoking Status     Never Smoker   Smokeless Tobacco     Never Used       VITALS:  Vitals:    09/19/18 0959   BP: 110/70   Pulse: 74   Weight: 154 lb (69.9 kg)     Wt Readings from Last 3 Encounters:   09/19/18 154 lb (69.9 kg)   07/25/18 152 lb (68.9 kg)   03/12/18 142 lb (64.4 kg)       PHYSICAL EXAM:  Constitutional:   Reveals a well appearing elderly female.  Vitals: per nursing notes.  Neuro:  Alert and oriented. Cranial nerves, motor, sensory exams are intact.  No gross focal deficits.  Psychiatric:  Memory intact, mood appropriate.      DATA REVIEWED:  Additional History from Old Records Summarized (2): None  Decision to Obtain Records (1): None  Radiology Tests Summarized or Ordered (1): None  Labs Reviewed or Ordered (1): Reviewed thyroid cascade from 9/17/18, normal.   Medicine Test Summarized or Ordered (1): None  Independent Review of EKG, X-RAY, or RAPID STREP (2 each): None    The visit lasted a total of 15 minutes face to face with the patient. Over 50% of the time was spent counseling and educating the patient about thyroid follow up.    I, Lola Daniels, am scribing for and in the presence of, Dr. Lala.    I, Dr. Lala, personally performed the services described in this documentation, as scribed by Lola Daniels in my presence, and it is both accurate and complete.    Total data points: 1

## 2021-06-20 NOTE — LETTER
Letter by Jamshid Lala MD at      Author: Jamshid Lala MD Service: -- Author Type: --    Filed:  Encounter Date: 3/19/2020 Status: (Other)         Iesha Patel  [address] [facility] [address] [address]             March 19, 2020         Dear Ms. Patel,    Below are the results from your recent visit:  Sugar is good at 86, no diabetes.  Liver and kidney tests are normal.  Markers of inflammation are normal.  Test for celiac disease is negative.  Normal blood counts, no anemia.    Lab work is all normal, await results of CT scan.      Resulted Orders   Comprehensive Metabolic Panel   Result Value Ref Range    Sodium 140 136 - 145 mmol/L    Potassium 4.3 3.5 - 5.0 mmol/L    Chloride 99 98 - 107 mmol/L    CO2 27 22 - 31 mmol/L    Anion Gap, Calculation 14 5 - 18 mmol/L    Glucose 86 70 - 125 mg/dL    BUN 14 8 - 28 mg/dL    Creatinine 0.85 0.60 - 1.10 mg/dL    GFR MDRD Af Amer >60 >60 mL/min/1.73m2    GFR MDRD Non Af Amer >60 >60 mL/min/1.73m2    Bilirubin, Total 0.3 0.0 - 1.0 mg/dL    Calcium 9.9 8.5 - 10.5 mg/dL    Protein, Total 7.5 6.0 - 8.0 g/dL    Albumin 4.4 3.5 - 5.0 g/dL    Alkaline Phosphatase 56 45 - 120 U/L    AST 29 0 - 40 U/L    ALT 21 0 - 45 U/L    Narrative    Fasting Glucose reference range is 70-99 mg/dL per  American Diabetes Association (ADA) guidelines.   HM2(CBC w/o Differential)   Result Value Ref Range    WBC 6.2 4.0 - 11.0 thou/uL    RBC 4.61 3.80 - 5.40 mill/uL    Hemoglobin 14.3 12.0 - 16.0 g/dL    Hematocrit 42.6 35.0 - 47.0 %    MCV 93 80 - 100 fL    MCH 31.0 27.0 - 34.0 pg    MCHC 33.5 32.0 - 36.0 g/dL    RDW 12.9 11.0 - 14.5 %    Platelets 255 140 - 440 thou/uL    MPV 6.7 (L) 7.0 - 10.0 fL   C-Reactive Protein   Result Value Ref Range    CRP 0.1 0.0 - 0.8 mg/dL   Celiac(Gluten)Antibody Panel   Result Value Ref Range    Gliadin IgA 1.6 0.0-<7.0 U/mL    Gliadin IgG 2.0 0.0-<7.0 U/mL    Tissue Transglutaminase IgG AB <0.6 0.0-<7.0 U/mL    Tissue Transglutaminase IgA  AB 1.7 0.0-<7.0 U/mL    Immunoglobulin A 394 65 - 400 mg/dL    Narrative      < 7 U/mL = Negative    7-10 U/mL = Equivocal    > 10 U/mL = Positive    Positive results for the tTG and/or gliadin antibodies indicate possible celiac disease and a small intestinal biopsy my be indicated. Antibody levels decrease in patients on gluten-free diets; therefore, negative results do not exclude celiac disease. Total serum IgA is measured to identify selective IgA deficiency, which is present in up to 10% of celiac disease patients. Such patients would have negative results on IgA assays, but may have positive results on IgG antibody assays.   Erythrocyte Sedimentation Rate   Result Value Ref Range    Sed Rate 7 0 - 20 mm/hr         Please call with questions or contact us using [email or web address].    Sincerely,        Electronically signed by Jamshid Lala MD

## 2021-06-20 NOTE — LETTER
Letter by Jamshid Lala MD at      Author: Jamshid Lala MD Service: -- Author Type: --    Filed:  Encounter Date: 12/11/2019 Status: Signed         Iesha Patel  [address] [facility] [address] [address]             December 11, 2019         Dear Ms. Stahlzel,    Below are the results from your recent visit: TSH or thyroid is completely normal.  Sugars good at 88, no diabetes.  Kidney tests are completely normal.    Resulted Orders   Thyroid Stimulating Hormone (TSH)   Result Value Ref Range    TSH 3.54 0.30 - 5.00 uIU/mL   Basic Metabolic Panel   Result Value Ref Range    Sodium 137 136 - 145 mmol/L    Potassium 3.8 3.5 - 5.0 mmol/L    Chloride 99 98 - 107 mmol/L    CO2 30 22 - 31 mmol/L    Anion Gap, Calculation 8 5 - 18 mmol/L    Glucose 88 70 - 125 mg/dL    Calcium 9.2 8.5 - 10.5 mg/dL    BUN 11 8 - 28 mg/dL    Creatinine 0.79 0.60 - 1.10 mg/dL    GFR MDRD Af Amer >60 >60 mL/min/1.73m2    GFR MDRD Non Af Amer >60 >60 mL/min/1.73m2    Narrative    Fasting Glucose reference range is 70-99 mg/dL per  American Diabetes Association (ADA) guidelines.            Please call with questions or contact us using [employer].    Sincerely,        Electronically signed by Jamshid Lala MD

## 2021-06-26 NOTE — PATIENT INSTRUCTIONS - HE
Nice to meet you today.We are going to plan a stress echocardiogram where you walk on the treadmill and we take pictures of your and valve before and after exercise.We talked about a screening CT scan called a CT coronary calcium score which is a 5 minute test underneath a ct scanner looking for plaque in the heart blood vessels.My nurse is She and her number is [phone]    We talked about monitoring your pulse a few times per day and if you feel and irregularity to us.The home monitor options are an apple watch or [email or web address].

## 2021-07-03 NOTE — ADDENDUM NOTE
Addendum Note by Bushra Maza CMA at 4/30/2018  8:34 AM     Author: Bushra Maza CMA Service: -- Author Type: Certified Medical Assistant    Filed: 4/30/2018  8:34 AM Encounter Date: 4/26/2018 Status: Signed    : Bushra Maza CMA (Certified Medical Assistant)    Addended by: BUSHRA MAZA on: 4/30/2018 08:34 AM        Modules accepted: Orders

## 2021-07-04 NOTE — PROGRESS NOTES
Progress Notes by Lane Gerber MD at 5/28/2021  9:10 AM     Author: Lane Gerber MD Service: -- Author Type: Physician    Filed: 5/28/2021 10:20 AM Encounter Date: 5/28/2021 Status: Signed    : Lane Gerber MD (Physician)             [facility] Office Consult       Assessment:   1.  Aortic stenosis.  Echocardiogram personally reviewed.  The report indicates that the request was that she return for additional imaging which she did that day and I have personally reviewed and findings are consistent with mild to possibly mild to moderate aortic stenosis with a mean gradient of 16 mmHg.  We are going to plan an exercise stress echocardiogram because of diminished exercise tolerance, shortness of breath and this will allow us to the gradients across the aortic valve pre and post exercise.  Periodic follow-up is recommended.    2.  History of an isolated episode of atrial fibrillation.  This occurred in the postoperative sate after back surgery.  She was seen by Dr. Dorian Black and reportedly a event monitor and Holter monitor were pursued.  Did find the [employer] report from 2018 and an episode of SVT was described on that monitoring.  He was started on metoprolol but because of increased fatigue she discontinued the medication.    3.  Cardiovascular risk.  Her primary physician recently started her on statin but the patient reported increased muscle cramps and discontinued.  Calculated 10-year risk is 9.3%.  We did talk about the possibility of coronary calcium scoring and I did write down this information for her and she will update me.  1. Aortic stenosis  Echo Stress Exercise    ECG Clinic - Today   2. Fatigue        Plan:   1.  Stress echocardiogram  2.  Consider coronary calcium score patient will think on this further.  3.  Consider formal sleep evaluation will defer to Dr. Carias.  4.  Further recommendations pending the outcome of the above stress echocardiogram  plan.        History of Present Illness:   Thank you for asking the [facility] team to see Iesha Patel a 75 y.o.  female  in consultation  to evaluate aortic stenosis with recent echocardiogram as well as fatigue and diminished exercise tolerance.  Patient reports that she has had diminished exercise tolerance and used to walk 2 miles but is very wiped out after she does this activity.  This symptom has been going on for quite some time as a relates to fatigue although the diminished exercise tolerance appears to be more recent.  She describes a vague chest heaviness with activity but was not describing it as a discomfort, pain or ache.  She has had no awareness of rapid heartbeat, syncope or near syncope.  She denies orthopnea or PND but does report significant insomnia.    Cardiac risk factors are pertinent for prior tobacco use, negative for alcohol, negative for hypertension, negative for diabetes, mild hyperlipidemia.  Total cholesterol recently of 204, HDL of 85, LDL of 106 with a good cholesterol of 85.  Father with a history of strokes in his 60s.  Mother with a history of heart valve replacement with rheumatic fever.  Brother with testicular and bladder cancer.  A half brother who  of a myocardial infarction.    He did have a single episode of atrial fibrillation in 2018 post back surgery and was noted to be hyperthyroid at that time.  Her Synthroid dose was adjusted.  She saw Dr. Dorian Black at that time and elected to come off anticoagulation with a chads 2 vascular score of 2.  Patient is aware of the potential for recurrence of atrial fibrillation and the stroke risk.  We did spend some time discussing atrial fibrillation.  She was not aware of the atrial fibrillation at its onset by report.    Past Medical History:     Past Medical History:   Diagnosis Date   ? Atrial fibrillation (H)     single episode never repeated, two separate monitor instances   ? Endometrial cancer (H)     ? Insomnia    Hypothyroidism.    Past Surgical History:     Past Surgical History:   Procedure Laterality Date   ? BACK SURGERY  2/16/18    spinal stenosis with lumbar claudication and synovial cyst removal. Dr. Lencho Lindsey at [facility]   ? CHOLECYSTECTOMY  1991    Description: Cholecystectomy;  Recorded: 03/01/2013;  Comments: 1991 by Dr. Tucker   ? HYSTERECTOMY  2000    Secondary to endometrial cancer.   ? LUMBAR FUSION  02/2018    L4 through S1   ? LUMBAR SPINE SURGERY  1985    Right sciatic, lumbar laminectomy.   ? MOLE REMOVAL  09/13/2011    seborrheic keratosis removed from her right anterior abdominal wall   ? OOPHORECTOMY Bilateral 2000   ? TOTAL HIP ARTHROPLASTY Left 01/05/2011    Dr. Albert at    ? TUBAL LIGATION  1980            Family History:     Family History   Problem Relation Age of Onset   ? Cancer Brother         testicular, bladder, kidney, skin   ? Diabetes type II Brother    ? Prostate cancer Brother    ? Heart disease Brother 59        heart attack    ? Valvular heart disease Mother    ? Diabetes type II Mother    ? Stroke Father    ? Alcoholism Daughter    ? Diabetes Brother    ? Cancer Brother    ? Allergies Brother        Social History:    reports that she has never smoked. She has never used smokeless tobacco. She reports current drug use. She reports that she does not drink alcohol.  The primary care physician is Monica Craias MD  Meds:   Scheduled Meds:  Current Outpatient Medications   Medication Sig Dispense Refill   ? cycloSPORINE (RESTASIS) 0.05 % ophthalmic emulsion Administer 1 drop to both eyes 2 (two) times a day.     ? hydrocortisone (ANUSOL-HC) 2.5 % rectal cream Apply rectally 2 times daily 30 g 1   ? levothyroxine (SYNTHROID, LEVOTHROID) 25 MCG tablet TAKE ONE TABLET BY MOUTH ONE TIME DAILY  90 tablet 3   ? MAGNESIUM HYDROXIDE (FOLEY MILK OF MAGNESIA ORAL) Take 1 capsule by mouth daily.     ? POLYETHYLENE GLYCOL 3350 (MIRALAX ORAL) Powder.       No  current facility-administered medications for this visit.        PRN Meds:.    Allergies:   Penicillins          Objective:      Physical Exam  155 lb (70.3 kg)     Wt Readings from Last 3 Encounters:   05/28/21 155 lb (70.3 kg)   05/14/21 154 lb (69.9 kg)   05/05/21 154 lb (69.9 kg)     Body mass index is 23.57 kg/m .  /58 (Patient Site: Left Arm, Patient Position: Sitting, Cuff Size: Adult Regular)   Pulse 68   Resp 18   Wt 155 lb (70.3 kg)   BMI 23.57 kg/m      General Appearance:   Alert, cooperative and in no acute distress.   HEENT:  No scleral icterus; the mucous membranes were pink and moist.   Neck: JVP within normal limits. No thyromegaly. No HJR   Chest: The spine was straight. The chest was symmetric.   Lungs:   Respirations unlabored; the lungs are clear to auscultation.   Cardiovascular:   S1 and S2 with 1-2 over 6 early peaking murmur, clicks or rubs. Brachial, radial, carotid and posterior tibial pulses are intact and symetrical.  No carotid bruits noted   Abdomen:  No organomegaly, masses, bruits, or tenderness. Bowels sounds are present   Extremities: No cyanosis, clubbing, or edema.   Skin: No xanthelasma.   Neurologic: Mood and affect are appropriate.             Lab Reviewed Personally by myself    Lab Results   Component Value Date     04/06/2021    K 4.6 04/06/2021     04/06/2021    CO2 28 04/06/2021    BUN 10 04/06/2021    CREATININE 0.78 04/06/2021    CALCIUM 8.8 04/06/2021     Lab Results   Component Value Date    CHOL 204 (H) 04/06/2021    TRIG 66 04/06/2021    HDL 85 04/06/2021     BNP (pg/mL)   Date Value   02/13/2017 24     Creatinine (mg/dL)   Date Value   04/06/2021 0.78   03/17/2020 0.85   12/10/2019 0.79   05/29/2019 0.78     LDL Calculated (mg/dL)   Date Value   04/06/2021 106   03/28/2017 100   03/18/2015 86     Lab Results   Component Value Date    WBC 6.2 03/17/2020    WBC 5.3 03/18/2015    HGB 13.1 04/06/2021    HCT 42.6 03/17/2020    MCV 93 03/17/2020      2020       Cardiac Testing:  Echo Complete  Order# [id number]  Reading physician: Elizabeth Henry MD Ordering physician: Monica Carias MD Study date: 21   Performing Date Performing Department   May 14, 2021  CARDIAC TESTING [[id number]]   Patient Information    Patient Name   Iesha Patel MRN   [id number] Sex   Female  1   1945 (75 y.o.)   Indications    Dx: Nonrheumatic aortic valve stenosis [I35.0 (ICD-10-CM)]; Chronic fatigue [R53.82 (ICD-10-CM)]   Summary      1.Left ventricle ejection fraction is normal. The calculated left ventricular ejection fraction is 74%.    2.Normal right ventricular size and systolic function. TAPSE is normal, which is consistent with normal right ventricular systolic function.    3.The aortic valve is tricuspid. There is mild global calcification with reduced excursion of the aortic valve present. Mild aortic stenosis, mean gradient 15 mmHg but valve not adequately interrogated.    4.When compared to the previous study dated 2019, no significant change, however visually aortic valve appears to be more stenotic. Would recommend repeat study with closer attention to aortic valve hemodynamics if clinically indicated.              ECG personally reviewed by myself shows normal sinus rhythm, normal EKG.         Review of Systems:       Review of Systems:   General: WNL  Eyes: WNL  Ears/Nose/Throat: WNL  Lungs: WNL  Heart: WNL  Stomach: WNL  Bladder: WNL  Muscle/Joints: WNL  Skin: WNL  Nervous System: WNL  Mental Health: WNL     Blood: WNL      This note has been dictated using voice recognition software. Any grammatical or context distortions are unintentional and inherent to the software.

## 2021-07-06 VITALS
SYSTOLIC BLOOD PRESSURE: 108 MMHG | DIASTOLIC BLOOD PRESSURE: 58 MMHG | WEIGHT: 155 LBS | RESPIRATION RATE: 18 BRPM | HEART RATE: 68 BPM | BODY MASS INDEX: 23.57 KG/M2

## 2021-07-12 ENCOUNTER — TELEPHONE (OUTPATIENT)
Dept: CARDIOLOGY | Facility: CLINIC | Age: 76
End: 2021-07-12

## 2021-07-12 DIAGNOSIS — Z13.6 ENCOUNTER FOR SCREENING FOR CORONARY ARTERY DISEASE: Primary | ICD-10-CM

## 2021-07-12 NOTE — TELEPHONE ENCOUNTER
Pt LVM asking for order for CT Calcium score.      July 9, 2021  Lane Gerber MD  to Pierce Patel        5:24 PM  Thank you for your note.  The reason why had mentioned this coronary calcium score test which is a brief test where you lay underneath the CT scanner without contrast or needles to look for the amount of calcified plaque in the blood vessels of your heart.  This was because you were not able to tolerate statin medication and this would help us make further decisions.  Please let me know your thoughts.  However there is going to be a computer shutdown over the weekend and I am not sure I will receive your email so better yet please contact my nurse next week to discuss further.  Name is She and her number is [phone]    Last read by Iesha Patel at 11:06 AM on 7/12/2021.    CT Calcium score ordered.  Pt updated, and was given phone # to call to schedule.  -myron

## 2021-07-21 ENCOUNTER — RECORDS - HEALTHEAST (OUTPATIENT)
Dept: ADMINISTRATIVE | Facility: CLINIC | Age: 76
End: 2021-07-21

## 2021-07-30 ENCOUNTER — HOSPITAL ENCOUNTER (OUTPATIENT)
Dept: CT IMAGING | Facility: CLINIC | Age: 76
Discharge: HOME | End: 2021-07-30
Attending: INTERNAL MEDICINE | Admitting: INTERNAL MEDICINE
Payer: COMMERCIAL

## 2021-07-30 DIAGNOSIS — Z13.6 ENCOUNTER FOR SCREENING FOR CORONARY ARTERY DISEASE: ICD-10-CM

## 2021-07-30 LAB
CV CALCIUM SCORE AGATSTON LM: 0
CV CALCIUM SCORING AGATSON LAD: 0
CV CALCIUM SCORING AGATSTON CX: 0
CV CALCIUM SCORING AGATSTON RCA: 0
CV CALCIUM SCORING AGATSTON TOTAL: 0

## 2021-07-30 PROCEDURE — 75571 CT HRT W/O DYE W/CA TEST: CPT | Mod: 26 | Performed by: INTERNAL MEDICINE

## 2021-07-30 PROCEDURE — 75571 CT HRT W/O DYE W/CA TEST: CPT

## 2021-08-03 PROBLEM — I48.91 ATRIAL FIBRILLATION (H): Status: RESOLVED | Noted: 2018-03-12 | Resolved: 2020-08-07

## 2021-08-04 DIAGNOSIS — I31.39 PERICARDIAL EFFUSION: Primary | ICD-10-CM

## 2021-08-04 NOTE — PROGRESS NOTES
Result Care Coordination    Result Notes   Lane Gerber MD   8/3/2021  5:09 PM CDT Back to Top      The calcium score is read as 0 which is excellent.  This means there is no hardened plaque identified in her coronary vessels.  It was performed to further risk stratify her with respect to initiation of a statin given intolerance.       However there was a comment regarding small pericardial effusion.  Can we please tell her that there was some suggestion of a small amount of fluid around the sac around her heart.  I think it would be a good idea to repeat a resting echocardiogram both to look at the aortic valve and the pericardial effusion.  Please let me know if she is in agreement.        Echo ordered, call transferred to scheduling.  -[facility]

## 2021-08-31 ENCOUNTER — HOSPITAL ENCOUNTER (OUTPATIENT)
Dept: CARDIOLOGY | Facility: CLINIC | Age: 76
Discharge: HOME | End: 2021-08-31
Attending: INTERNAL MEDICINE | Admitting: INTERNAL MEDICINE
Payer: COMMERCIAL

## 2021-08-31 DIAGNOSIS — I31.39 PERICARDIAL EFFUSION: ICD-10-CM

## 2021-08-31 LAB — LVEF ECHO: NORMAL

## 2021-08-31 PROCEDURE — 93306 TTE W/DOPPLER COMPLETE: CPT

## 2021-08-31 PROCEDURE — 93306 TTE W/DOPPLER COMPLETE: CPT | Mod: 26 | Performed by: INTERNAL MEDICINE

## 2021-10-04 ENCOUNTER — TRANSFERRED RECORDS (OUTPATIENT)
Dept: HEALTH INFORMATION MANAGEMENT | Facility: CLINIC | Age: 76
End: 2021-10-04

## 2021-10-10 ENCOUNTER — HEALTH MAINTENANCE LETTER (OUTPATIENT)
Age: 76
End: 2021-10-10

## 2021-12-07 ASSESSMENT — ACTIVITIES OF DAILY LIVING (ADL): CURRENT_FUNCTION: NO ASSISTANCE NEEDED

## 2021-12-09 ENCOUNTER — OFFICE VISIT (OUTPATIENT)
Dept: FAMILY MEDICINE | Facility: CLINIC | Age: 76
End: 2021-12-09
Payer: COMMERCIAL

## 2021-12-09 VITALS
WEIGHT: 154 LBS | HEART RATE: 73 BPM | BODY MASS INDEX: 23.34 KG/M2 | SYSTOLIC BLOOD PRESSURE: 122 MMHG | OXYGEN SATURATION: 98 % | HEIGHT: 68 IN | DIASTOLIC BLOOD PRESSURE: 78 MMHG

## 2021-12-09 DIAGNOSIS — Z13.21 ENCOUNTER FOR VITAMIN DEFICIENCY SCREENING: ICD-10-CM

## 2021-12-09 DIAGNOSIS — M85.9 DISORDER OF BONE DENSITY AND STRUCTURE, UNSPECIFIED: ICD-10-CM

## 2021-12-09 DIAGNOSIS — F34.1 DYSTHYMIA: Primary | ICD-10-CM

## 2021-12-09 DIAGNOSIS — I35.0 AORTIC VALVE STENOSIS, ETIOLOGY OF CARDIAC VALVE DISEASE UNSPECIFIED: ICD-10-CM

## 2021-12-09 DIAGNOSIS — Z00.00 ENCOUNTER FOR MEDICARE ANNUAL WELLNESS EXAM: ICD-10-CM

## 2021-12-09 DIAGNOSIS — Z00.00 HEALTHCARE MAINTENANCE: ICD-10-CM

## 2021-12-09 DIAGNOSIS — M85.80 OSTEOPENIA, UNSPECIFIED LOCATION: ICD-10-CM

## 2021-12-09 DIAGNOSIS — Z12.11 SCREEN FOR COLON CANCER: ICD-10-CM

## 2021-12-09 DIAGNOSIS — E03.9 HYPOTHYROIDISM, UNSPECIFIED TYPE: ICD-10-CM

## 2021-12-09 PROBLEM — R00.0 SINUS TACHYCARDIA: Status: ACTIVE | Noted: 2021-12-09

## 2021-12-09 PROBLEM — M48.061 LUMBAR SPINAL STENOSIS: Status: ACTIVE | Noted: 2021-12-09

## 2021-12-09 LAB
ALBUMIN SERPL-MCNC: 4.3 G/DL (ref 3.5–5)
ALP SERPL-CCNC: 52 U/L (ref 45–120)
ALT SERPL W P-5'-P-CCNC: 16 U/L (ref 0–45)
ANION GAP SERPL CALCULATED.3IONS-SCNC: 6 MMOL/L (ref 5–18)
AST SERPL W P-5'-P-CCNC: 25 U/L (ref 0–40)
BILIRUB SERPL-MCNC: 0.4 MG/DL (ref 0–1)
BUN SERPL-MCNC: 11 MG/DL (ref 8–28)
CALCIUM SERPL-MCNC: 9.8 MG/DL (ref 8.5–10.5)
CHLORIDE BLD-SCNC: 98 MMOL/L (ref 98–107)
CHOLEST SERPL-MCNC: 195 MG/DL
CO2 SERPL-SCNC: 30 MMOL/L (ref 22–31)
CREAT SERPL-MCNC: 0.8 MG/DL (ref 0.6–1.1)
FASTING STATUS PATIENT QL REPORTED: NO
GFR SERPL CREATININE-BSD FRML MDRD: 73 ML/MIN/1.73M2
GLUCOSE BLD-MCNC: 93 MG/DL (ref 70–125)
HDLC SERPL-MCNC: 91 MG/DL
LDLC SERPL CALC-MCNC: 88 MG/DL
POTASSIUM BLD-SCNC: 4.4 MMOL/L (ref 3.5–5)
PROT SERPL-MCNC: 6.9 G/DL (ref 6–8)
SODIUM SERPL-SCNC: 134 MMOL/L (ref 136–145)
T4 FREE SERPL-MCNC: 0.8 NG/DL (ref 0.7–1.8)
TRIGL SERPL-MCNC: 81 MG/DL
TSH SERPL DL<=0.005 MIU/L-ACNC: 7.65 UIU/ML (ref 0.3–5)

## 2021-12-09 PROCEDURE — 99397 PER PM REEVAL EST PAT 65+ YR: CPT | Performed by: FAMILY MEDICINE

## 2021-12-09 PROCEDURE — 84443 ASSAY THYROID STIM HORMONE: CPT | Performed by: FAMILY MEDICINE

## 2021-12-09 PROCEDURE — 80061 LIPID PANEL: CPT | Performed by: FAMILY MEDICINE

## 2021-12-09 PROCEDURE — 82306 VITAMIN D 25 HYDROXY: CPT | Performed by: FAMILY MEDICINE

## 2021-12-09 PROCEDURE — 80053 COMPREHEN METABOLIC PANEL: CPT | Performed by: FAMILY MEDICINE

## 2021-12-09 PROCEDURE — 84439 ASSAY OF FREE THYROXINE: CPT | Performed by: FAMILY MEDICINE

## 2021-12-09 PROCEDURE — 36415 COLL VENOUS BLD VENIPUNCTURE: CPT | Performed by: FAMILY MEDICINE

## 2021-12-09 PROCEDURE — 99213 OFFICE O/P EST LOW 20 MIN: CPT | Mod: 25 | Performed by: FAMILY MEDICINE

## 2021-12-09 RX ORDER — FLUOXETINE 10 MG/1
10 TABLET, FILM COATED ORAL DAILY
Qty: 30 TABLET | Refills: 3 | Status: SHIPPED | OUTPATIENT
Start: 2021-12-09 | End: 2022-04-07

## 2021-12-09 ASSESSMENT — ACTIVITIES OF DAILY LIVING (ADL): CURRENT_FUNCTION: NO ASSISTANCE NEEDED

## 2021-12-09 ASSESSMENT — PATIENT HEALTH QUESTIONNAIRE - PHQ9: 5. POOR APPETITE OR OVEREATING: SEVERAL DAYS

## 2021-12-09 ASSESSMENT — ANXIETY QUESTIONNAIRES
GAD7 TOTAL SCORE: 7
1. FEELING NERVOUS, ANXIOUS, OR ON EDGE: SEVERAL DAYS
7. FEELING AFRAID AS IF SOMETHING AWFUL MIGHT HAPPEN: SEVERAL DAYS
3. WORRYING TOO MUCH ABOUT DIFFERENT THINGS: SEVERAL DAYS
6. BECOMING EASILY ANNOYED OR IRRITABLE: SEVERAL DAYS
2. NOT BEING ABLE TO STOP OR CONTROL WORRYING: SEVERAL DAYS
5. BEING SO RESTLESS THAT IT IS HARD TO SIT STILL: SEVERAL DAYS
IF YOU CHECKED OFF ANY PROBLEMS ON THIS QUESTIONNAIRE, HOW DIFFICULT HAVE THESE PROBLEMS MADE IT FOR YOU TO DO YOUR WORK, TAKE CARE OF THINGS AT HOME, OR GET ALONG WITH OTHER PEOPLE: SOMEWHAT DIFFICULT

## 2021-12-09 ASSESSMENT — MIFFLIN-ST. JEOR: SCORE: 1247.04

## 2021-12-09 NOTE — PROGRESS NOTES
"SUBJECTIVE:   Iesha Patel is a 76 year old female who presents for Preventive Visit.    Hemorrhoids:  - pt believes she has a hemorrhoid  - she struggles to get preparation H even applied because it is so 'swollen'  - it doesn't significantly itch but does cause pain    Depression/mood concerns:  - pt has felt really isolated and now doesn't like leaving the house  - she lost her nephew to covid and thinks it's bringing up the grief she had from losing her   - she does have a counselor      Patient has been advised of split billing requirements and indicates understanding: Yes   Are you in the first 12 months of your Medicare coverage?  No    Healthy Habits:     In general, how would you rate your overall health?  Good    Frequency of exercise:  4-5 days/week    Duration of exercise:  15-30 minutes    Do you usually eat at least 4 servings of fruit and vegetables a day, include whole grains    & fiber and avoid regularly eating high fat or \"junk\" foods?  Yes    Taking medications regularly:  Yes    Medication side effects:  Not applicable    Ability to successfully perform activities of daily living:  No assistance needed    Home Safety:  No safety concerns identified    Hearing Impairment:  No hearing concerns    In the past 6 months, have you been bothered by leaking of urine?  No    In general, how would you rate your overall mental or emotional health?  Fair      PHQ-2 Total Score: 2    Additional concerns today:  Yes    Do you feel safe in your environment? Yes    Have you ever done Advance Care Planning? (For example, a Health Directive, POLST, or a discussion with a medical provider or your loved ones about your wishes): Yes, advance care planning is on file.      Fall risk  Fallen 2 or more times in the past year?: No  Any fall with injury in the past year?: No  click delete button to remove this line now  Cognitive Screening   1) Repeat 3 items (Leader, Season, Table)    2) Clock draw: NORMAL  3) " "3 item recall: Recalls 2 objects   Results: NORMAL clock, 1-2 items recalled: COGNITIVE IMPAIRMENT LESS LIKELY    Mini-CogTM Copyright S Bandar. Licensed by the author for use in [facility]; reprinted with permission (za@.[facility]). All rights reserved.      Do you have sleep apnea, excessive snoring or daytime drowsiness?: no    Reviewed and updated as needed this visit by clinical staff  Tobacco  Allergies  Meds  Problems            Reviewed and updated as needed this visit by Provider    Meds  Problems           Social History     Tobacco Use     Smoking status: Never Smoker     Smokeless tobacco: Never Used   Substance Use Topics     Alcohol use: No       Alcohol Use 12/7/2021   Prescreen: >3 drinks/day or >7 drinks/week? Not Applicable       Current providers sharing in care for this patient include:   Patient Care Team:  Monica Carias MD as PCP - General  Monica Carias MD as Assigned PCP  Lane Gerber MD as Assigned Heart and Vascular Provider    The following health maintenance items are reviewed in Epic and correct as of today:  Health Maintenance Due   Topic Date Due     COLORECTAL CANCER SCREENING  07/14/2015       Review of Systems   Endorses: arthritis and depression  Constitutional, HEENT, cardiovascular, pulmonary, GI, , musculoskeletal, neuro, skin, endocrine and psych systems are negative, except as otherwise noted.    OBJECTIVE:   /78   Pulse 73   Ht 1.727 m (5' 8\")   Wt 69.9 kg (154 lb)   SpO2 98%   BMI 23.42 kg/m   Estimated body mass index is 23.42 kg/m  as calculated from the following:    Height as of this encounter: 1.727 m (5' 8\").    Weight as of this encounter: 69.9 kg (154 lb).  Physical Exam  Gen: Alert, NAD, appears stated age, normal hygiene   Eyes: conjunctivae without injection, sclera clear, EOMI  ENT/mouth: nares clear, septum midline, absent rhinorrhea, throat without injection, neck is supple, no thyroid enlargement  CV: RRR, no " murmur appreciated, pedal edema absent bilaterally  Resp: CTAB, no wheezes, rales or ronchi  ABD: normoactive, non-tender to palpation, nondistended  MSK: grossly full range of motion in all joints, no obvious deformity  Neuro: CN II-XII grossly intact, no deficits in coordination  Psych: no apparent hallucinations or delusions, no pressured speech; alert, oriented x3, tearful  SKIN: dry and without lesions  Heme/lymph: no pallor, no active bleeding/bruising, no adenopathy appreciated      ASSESSMENT / PLAN:     Pt presents for awv:    USPSTF Recommendations for age 76:  Patient has been counseled on/screened for:  - intimate partner violence and there are no concerns at this time  - a healthful diet and physical activity for CVD prevention  - Diabetes and hyperlipidemia: screening was performed.   - Hep C, negative in 2021  - Asprin use: patient chose not to start  Sexually transmitted infections: Patient would not like to be screened for chlamydia, gonorrhea, syphilis, HIV, and hepatitis  Colorectal Cancer: Cologuard previously ordered, pt will send in  Immunizations: up to date   Cervical Cancer Screening: done with screening  Mammogram: due in May 2022  Bone Density: Dexa scan due in 2023  Fall risk assessment: Get up and go test completed today without concern      Iesha was seen today for wellness visit.    Diagnoses and all orders for this visit:    Dysthymia  Extensive discussion about treatment for mood.  Appreciate the patient already has a therapist, low-dose Prozac prescribed today.  We also check a vitamin D and a thyroid.  -     FLUoxetine (PROZAC) 10 MG tablet; Take 1 tablet (10 mg) by mouth daily  -     Vitamin D Deficiency    Hypothyroidism, unspecified type  -     TSH with free T4 reflex    Aortic valve stenosis, etiology of cardiac valve disease unspecified  Mild per recent echo, managed by cardiology, asymptomatic    Osteopenia, unspecified location  Disorder of bone density and structure,  "unspecified   -     Vitamin D Deficiency    Screen for colon cancer  Patient will send in her Cologuard at home    Healthcare maintenance  -     Comprehensive metabolic panel (BMP + Alb, Alk Phos, ALT, AST, Total. Bili, TP)  -     Lipid panel reflex to direct LDL Non-fasting    Encounter for Medicare annual wellness exam    Encounter for vitamin deficiency screening  -     Vitamin D Deficiency    Other orders  -     REVIEW OF HEALTH MAINTENANCE PROTOCOL ORDERS        Patient has been advised of split billing requirements and indicates understanding: Yes  COUNSELING:  Reviewed preventive health counseling, as reflected in patient instructions    Estimated body mass index is 23.42 kg/m  as calculated from the following:    Height as of this encounter: 1.727 m (5' 8\").    Weight as of this encounter: 69.9 kg (154 lb).    She reports that she has never smoked. She has never used smokeless tobacco.      Appropriate preventive services were discussed with this patient, including applicable screening as appropriate for cardiovascular disease, diabetes, osteopenia/osteoporosis, and glaucoma.  As appropriate for age/gender, discussed screening for colorectal cancer, prostate cancer, breast cancer, and cervical cancer. Checklist reviewing preventive services available has been given to the patient.    Reviewed patients plan of care and provided an AVS. The Basic Care Plan (routine screening as documented in Health Maintenance) for Iesha meets the Care Plan requirement. This Care Plan has been established and reviewed with the Patient.    Counseling Resources:  ATP IV Guidelines  Pooled Cohorts Equation Calculator  Breast Cancer Risk Calculator  Breast Cancer: Medication to Reduce Risk  FRAX Risk Assessment  ICSI Preventive Guidelines  Dietary Guidelines for Americans, 2010  REHAPP's MyPlate  ASA Prophylaxis  Lung CA Screening    Monica Carias MD  [facility]    Identified Health Risks:    The patient " was provided with suggestions to help her develop a healthy emotional lifestyle.

## 2021-12-09 NOTE — PATIENT INSTRUCTIONS
Young hot widows club to help with grief        Patient Education   Personalized Prevention Plan  You are due for the preventive services outlined below.  Your care team is available to assist you in scheduling these services.  If you have already completed any of these items, please share that information with your care team to update in your medical record.  Health Maintenance Due   Topic Date Due     Colorectal Cancer Screening  07/14/2015     Your Health Risk Assessment indicates you feel you are not in good emotional health.    Recreation   Recreation is not limited to sports and team events. It includes any activity that provides relaxation, interest, enjoyment, and exercise. Recreation provides an outlet for physical, mental, and social energy. It can give a sense of worth and achievement. It can help you stay healthy.    Mental Exercise and Social Involvement  Mental and emotional health is as important as physical health. Keep in touch with friends and family. Stay as active as possible. Continue to learn and challenge yourself.   Things you can do to stay mentally active are:    Learn something new, like a foreign language or musical instrument.     Play SCRABBLE or do crossword puzzles. If you cannot find people to play these games with you at home, you can play them with others on your computer through the Internet.     Join a games club--anything from card games to chess or checkers or lawn bowling.     Start a new hobby.     Go back to school.     Volunteer.     Read.   Keep up with world events.

## 2021-12-10 DIAGNOSIS — E03.9 HYPOTHYROIDISM, UNSPECIFIED TYPE: Primary | ICD-10-CM

## 2021-12-10 LAB — DEPRECATED CALCIDIOL+CALCIFEROL SERPL-MC: 59 UG/L (ref 30–80)

## 2021-12-10 RX ORDER — LEVOTHYROXINE SODIUM 50 UG/1
50 TABLET ORAL DAILY
Qty: 90 TABLET | Refills: 3 | Status: SHIPPED | OUTPATIENT
Start: 2021-12-10 | End: 2022-09-10

## 2021-12-10 ASSESSMENT — PATIENT HEALTH QUESTIONNAIRE - PHQ9: SUM OF ALL RESPONSES TO PHQ QUESTIONS 1-9: 5

## 2021-12-10 ASSESSMENT — ANXIETY QUESTIONNAIRES: GAD7 TOTAL SCORE: 7

## 2022-03-29 ENCOUNTER — TRANSFERRED RECORDS (OUTPATIENT)
Dept: HEALTH INFORMATION MANAGEMENT | Facility: CLINIC | Age: 77
End: 2022-03-29
Payer: COMMERCIAL

## 2022-04-06 DIAGNOSIS — F34.1 DYSTHYMIA: ICD-10-CM

## 2022-04-07 RX ORDER — FLUOXETINE 10 MG/1
TABLET, FILM COATED ORAL
Qty: 30 TABLET | Refills: 0 | Status: SHIPPED | OUTPATIENT
Start: 2022-04-07 | End: 2022-05-09

## 2022-04-07 NOTE — TELEPHONE ENCOUNTER
"Routing refill request to provider for review/approval because:  PHQ9 score - greater than 4.    Last Written Prescription Date:  12/9/21  Last Fill Quantity: 30,  # refills: 3   Last office visit provider:  12/9/21     Requested Prescriptions   Pending Prescriptions Disp Refills     FLUoxetine (PROZAC) 10 MG tablet [Pharmacy Med Name: FLUoxetine HCl Oral Tablet 10 MG] 30 tablet 0     Sig: TAKE ONE TABLET BY MOUTH ONE TIME DAILY       SSRIs Protocol Failed - 4/7/2022 10:34 AM        Failed - PHQ-9 score less than 5 in past 6 months     Please review last PHQ-9 score.           Passed - Medication is active on med list        Passed - Patient is age 18 or older        Passed - No active pregnancy on record        Passed - No positive pregnancy test in last 12 months        Passed - Recent (6 mo) or future (30 days) visit within the authorizing provider's specialty     Patient had office visit in the last 6 months or has a visit in the next 30 days with authorizing provider or within the authorizing provider's specialty.  See \"Patient Info\" tab in inbasket, or \"Choose Columns\" in Meds & Orders section of the refill encounter.                 Alexx Brumfield RN 04/07/22 10:34 AM  "

## 2022-04-15 ENCOUNTER — TRANSFERRED RECORDS (OUTPATIENT)
Dept: HEALTH INFORMATION MANAGEMENT | Facility: CLINIC | Age: 77
End: 2022-04-15
Payer: COMMERCIAL

## 2022-05-06 DIAGNOSIS — F34.1 DYSTHYMIA: ICD-10-CM

## 2022-05-09 DIAGNOSIS — F34.1 DYSTHYMIA: ICD-10-CM

## 2022-05-09 RX ORDER — FLUOXETINE 10 MG/1
TABLET, FILM COATED ORAL
Qty: 30 TABLET | Refills: 0 | Status: SHIPPED | OUTPATIENT
Start: 2022-05-09 | End: 2022-06-07

## 2022-05-09 NOTE — TELEPHONE ENCOUNTER
Pt is calling stating that she sent a request a few days ago but now will be out for tomorrow.     Pt is worried about missing a dose and would like to have this sent to pharmacy as soon as possible.

## 2022-05-09 NOTE — TELEPHONE ENCOUNTER
"Routing refill request to provider for review/approval because:  PHQ9 score - greater than 4.    Last Written Prescription Date:  4/7/22  Last Fill Quantity: 30,  # refills: 0   Last office visit provider:  12/9/21     Requested Prescriptions   Pending Prescriptions Disp Refills     FLUoxetine (PROZAC) 10 MG tablet [Pharmacy Med Name: FLUoxetine HCl Oral Tablet 10 MG] 30 tablet 0     Sig: TAKE ONE TABLET BY MOUTH ONE TIME DAILY       SSRIs Protocol Failed - 5/9/2022 11:14 AM        Failed - PHQ-9 score less than 5 in past 6 months     Please review last PHQ-9 score.           Passed - Medication is active on med list        Passed - Patient is age 18 or older        Passed - No active pregnancy on record        Passed - No positive pregnancy test in last 12 months        Passed - Recent (6 mo) or future (30 days) visit within the authorizing provider's specialty     Patient had office visit in the last 6 months or has a visit in the next 30 days with authorizing provider or within the authorizing provider's specialty.  See \"Patient Info\" tab in inbasket, or \"Choose Columns\" in Meds & Orders section of the refill encounter.                 Alexx Brumfield RN 05/09/22 11:14 AM    "

## 2022-05-11 DIAGNOSIS — F34.1 DYSTHYMIA: ICD-10-CM

## 2022-05-11 RX ORDER — FLUOXETINE 10 MG/1
TABLET, FILM COATED ORAL
Qty: 30 TABLET | Refills: 0 | OUTPATIENT
Start: 2022-05-11

## 2022-05-12 NOTE — TELEPHONE ENCOUNTER
Patient calling back saying that [facility] did not fill this yet, patient only has enough to get through today, please advise asap.         Call taken on 5/12/2022 at 8:17 AM by Vidya Acosta

## 2022-05-12 NOTE — TELEPHONE ENCOUNTER
Patient is requesting to have it changed to 3 fills at a time to be less of a hassle to call each month, please adjust if appropriate.         Per RN- other encounter Rx filled on 5/9/2022- 0 refills    Call taken on 5/12/2022 at 8:17 AM by Vidya Acosta

## 2022-05-12 NOTE — TELEPHONE ENCOUNTER
Pharmacy confirmation from [facility] states they received this medication request at 12:44 on the 9th of May. Called to double check and pharmacy stated they never received that RX. Called in Rx for patient today. Patient informed and will  at pharmacy.

## 2022-05-12 NOTE — TELEPHONE ENCOUNTER
Please call pharmacy to see if they have refill on file, looks like it was sent in on 5/9.    Thanks,  Dr. Oneill

## 2022-05-12 NOTE — TELEPHONE ENCOUNTER
Pharmacy called stating they do not have this refill request. She states she is not sure with happened within the system but is asking if it can be resent as soon as possible. Please advise Rx was sent but pharmacy does not have it and is asking for the same Rx just to be resent. Please advise

## 2022-05-13 RX ORDER — FLUOXETINE 10 MG/1
TABLET, FILM COATED ORAL
Qty: 30 TABLET | Refills: 0 | OUTPATIENT
Start: 2022-05-13

## 2022-05-17 ENCOUNTER — TRANSFERRED RECORDS (OUTPATIENT)
Dept: HEALTH INFORMATION MANAGEMENT | Facility: CLINIC | Age: 77
End: 2022-05-17
Payer: COMMERCIAL

## 2022-06-14 ENCOUNTER — TRANSFERRED RECORDS (OUTPATIENT)
Dept: HEALTH INFORMATION MANAGEMENT | Facility: CLINIC | Age: 77
End: 2022-06-14
Payer: COMMERCIAL

## 2022-06-23 ENCOUNTER — HOSPITAL ENCOUNTER (OUTPATIENT)
Dept: RADIOLOGY | Facility: CLINIC | Age: 77
Discharge: HOME | End: 2022-06-23
Attending: INTERNAL MEDICINE | Admitting: INTERNAL MEDICINE
Payer: COMMERCIAL

## 2022-06-23 DIAGNOSIS — K60.1 CHRONIC ANAL FISSURE: ICD-10-CM

## 2022-06-23 DIAGNOSIS — R19.5 LOOSE STOOLS: ICD-10-CM

## 2022-06-23 PROCEDURE — 74018 RADEX ABDOMEN 1 VIEW: CPT

## 2022-06-28 ENCOUNTER — TRANSFERRED RECORDS (OUTPATIENT)
Dept: HEALTH INFORMATION MANAGEMENT | Facility: CLINIC | Age: 77
End: 2022-06-28

## 2022-07-18 ENCOUNTER — TRANSFERRED RECORDS (OUTPATIENT)
Dept: HEALTH INFORMATION MANAGEMENT | Facility: CLINIC | Age: 77
End: 2022-07-18

## 2022-09-04 ASSESSMENT — PATIENT HEALTH QUESTIONNAIRE - PHQ9
SUM OF ALL RESPONSES TO PHQ QUESTIONS 1-9: 2
10. IF YOU CHECKED OFF ANY PROBLEMS, HOW DIFFICULT HAVE THESE PROBLEMS MADE IT FOR YOU TO DO YOUR WORK, TAKE CARE OF THINGS AT HOME, OR GET ALONG WITH OTHER PEOPLE: NOT DIFFICULT AT ALL
SUM OF ALL RESPONSES TO PHQ QUESTIONS 1-9: 2

## 2022-09-06 PROBLEM — I25.10 ASCVD (ARTERIOSCLEROTIC CARDIOVASCULAR DISEASE): Status: ACTIVE | Noted: 2022-09-06

## 2022-09-06 PROBLEM — F34.1 DYSTHYMIA: Status: ACTIVE | Noted: 2022-09-06

## 2022-09-06 PROBLEM — R53.83 FATIGUE: Status: RESOLVED | Noted: 2021-05-28 | Resolved: 2022-09-06

## 2022-09-07 ENCOUNTER — OFFICE VISIT (OUTPATIENT)
Dept: FAMILY MEDICINE | Facility: CLINIC | Age: 77
End: 2022-09-07
Payer: COMMERCIAL

## 2022-09-07 VITALS
SYSTOLIC BLOOD PRESSURE: 136 MMHG | DIASTOLIC BLOOD PRESSURE: 84 MMHG | BODY MASS INDEX: 23.3 KG/M2 | OXYGEN SATURATION: 98 % | HEIGHT: 68 IN | HEART RATE: 76 BPM | WEIGHT: 153.7 LBS | TEMPERATURE: 98.3 F

## 2022-09-07 DIAGNOSIS — F34.1 DYSTHYMIA: ICD-10-CM

## 2022-09-07 DIAGNOSIS — I25.10 ASCVD (ARTERIOSCLEROTIC CARDIOVASCULAR DISEASE): ICD-10-CM

## 2022-09-07 DIAGNOSIS — E03.9 HYPOTHYROIDISM, UNSPECIFIED TYPE: Primary | ICD-10-CM

## 2022-09-07 LAB — TSH SERPL DL<=0.005 MIU/L-ACNC: 5.9 UIU/ML (ref 0.3–4.2)

## 2022-09-07 PROCEDURE — 36415 COLL VENOUS BLD VENIPUNCTURE: CPT | Performed by: FAMILY MEDICINE

## 2022-09-07 PROCEDURE — 99214 OFFICE O/P EST MOD 30 MIN: CPT | Mod: 25 | Performed by: FAMILY MEDICINE

## 2022-09-07 PROCEDURE — 90662 IIV NO PRSV INCREASED AG IM: CPT | Performed by: FAMILY MEDICINE

## 2022-09-07 PROCEDURE — G0008 ADMIN INFLUENZA VIRUS VAC: HCPCS | Performed by: FAMILY MEDICINE

## 2022-09-07 PROCEDURE — 84443 ASSAY THYROID STIM HORMONE: CPT | Performed by: FAMILY MEDICINE

## 2022-09-07 ASSESSMENT — PATIENT HEALTH QUESTIONNAIRE - PHQ9
10. IF YOU CHECKED OFF ANY PROBLEMS, HOW DIFFICULT HAVE THESE PROBLEMS MADE IT FOR YOU TO DO YOUR WORK, TAKE CARE OF THINGS AT HOME, OR GET ALONG WITH OTHER PEOPLE: NOT DIFFICULT AT ALL
SUM OF ALL RESPONSES TO PHQ QUESTIONS 1-9: 2

## 2022-09-07 ASSESSMENT — PAIN SCALES - GENERAL: PAINLEVEL: NO PAIN (0)

## 2022-09-07 NOTE — PROGRESS NOTES
Assessment & Plan     Hypothyroidism, unspecified type  Clinically euthyroid.  She has added Linzess at the same time she is taking levothyroxine for the past 2 weeks.  We will check TSH today and will keep this in mind.  - TSH; Future  - TSH    Dysthymia  Stable on fluoxetine at current dose.  Continue.  We discussed consideration of attempting to wean in the spring.    ASCVD (arteriosclerotic cardiovascular disease)  CT coronary calcium score of 0.  It does not seem that heart disease is significant risk for her.  She has not tolerated statins in the past.  Cardiology suggest that aspirin is not strongly indicated, therefore she elects to not take aspirin which is reasonable.  - STATIN NOT PRESCRIBED (INTENTIONAL); Please choose reason not prescribed from choices below.  - ASPIRIN NOT PRESCRIBED (INTENTIONAL); Please choose reason not prescribed from choices below.      35 minutes spent on the date of the encounter doing chart review, history and exam, documentation and further activities per the note           No follow-ups on file.   Follow-up Visit   Expected date:  Jan 07, 2023 (Approximate)      Follow Up Appointment Details:     Follow-up with whom?: Me    Follow-Up for what?: Medicare Wellness    Welcome or Annual?: Annual Wellness    How?: In Person                    Edie Esqueda MD  [facility]    Ana Julian is a 76 year old, presenting for the following health issues:  Recheck Medication (Est. Care - labs - Medications )      Here to establish care.  Started on fluoxetine in December due to depression symptoms, diagnosed with dysthymia.  Notes that the COVID years have been rough, she lost a 42-year-old nephew to COVID, was feeling very down.  She started seeing a therapist.  Tolerating fluoxetine well and in fact is sleeping better than before, feels better than she has in several years.  Would like to continue but consider options for attempt to wean in the future.   History of aortic stenosis seen by Dr. Gerber, recommend intermittent echocardiograms.  Previous diagnosis of atherosclerotic heart disease, she actually had a coronary calcium score of 0.  She tried to take a statin but did not tolerate it.  Saw Dr. Gerber, no further interventions were recommended.  History of hypothyroidism, her levothyroxine dose was adjusted in December, due for follow-up.  History of chronic constipation, has been working to taper her laxatives as she was started on Linzess, currently taking MiraLAX once daily and Citrucel.  Followed by Dr. Alexx Carroll of [facility].  Previously seen by Dr. Villatoro for chronic anal fissure status post sphincterotomy, doing well from that standpoint.  Isolated history of atrial fibrillation postoperatively, took metoprolol for a little while but it caused fatigue.  She has had a history of a Holter monitor after that episode, did not have any recurrence and so remains off stroke prophylaxis for that reason.    She is exercising by walking.  She also does some shoulder and back exercises with history of lumbar stenosis.    She was born to missionary parents, spent her teenage years in [place].   and lived in [place] for 7 years, later was .  She remarried in 2008, he passed in 2013.  She is currently residing in a senior apartment independently.  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  She has 3 children, 3 grandchildren, and 1 great granddaughter.    History of Present Illness       Mental Health Follow-up:  Patient presents to follow-up on Depression.Patient's depression since last visit has been:  Good  The patient is not having other symptoms associated with depression.      Any significant life events: No  Patient is not feeling anxious or having panic attacks.  Patient has no concerns about alcohol or drug use.    She eats 2-3 servings of fruits and vegetables  "daily.She consumes 0 sweetened beverage(s) daily.She exercises with enough effort to increase her heart rate 20 to 29 minutes per day.  She exercises with enough effort to increase her heart rate 5 days per week.   She is taking medications regularly.    Today's PHQ-9         PHQ-9 Total Score: 2    PHQ-9 Q9 Thoughts of better off dead/self-harm past 2 weeks :   Not at all    How difficult have these problems made it for you to do your work, take care of things at home, or get along with other people: Not difficult at all             Review of Systems         Objective    /84 (BP Location: Left arm, Patient Position: Sitting, Cuff Size: Adult Large)   Pulse 76   Temp 98.3  F (36.8  C) (Oral)   Ht 1.73 m (5' 8.11\")   Wt 69.7 kg (153 lb 11.2 oz)   SpO2 98%   BMI 23.29 kg/m    Body mass index is 23.29 kg/m .  Physical Exam   Alert female.  Thought processes and judgment intact.  Appropriate affect, very pleasant.  Heart was regular rate and rhythm.  Lungs clear.  No edema.                    [unfilled]  [unfilled]  "

## 2022-09-10 DIAGNOSIS — E03.9 HYPOTHYROIDISM, UNSPECIFIED TYPE: ICD-10-CM

## 2022-09-10 RX ORDER — LEVOTHYROXINE SODIUM 75 UG/1
75 TABLET ORAL DAILY
Qty: 90 TABLET | Refills: 3 | Status: SHIPPED | OUTPATIENT
Start: 2022-09-10 | End: 2023-08-29

## 2022-09-20 ENCOUNTER — TRANSFERRED RECORDS (OUTPATIENT)
Dept: HEALTH INFORMATION MANAGEMENT | Facility: CLINIC | Age: 77
End: 2022-09-20

## 2022-10-16 PROBLEM — R00.0 SINUS TACHYCARDIA: Status: RESOLVED | Noted: 2021-12-09 | Resolved: 2022-10-16

## 2022-10-16 PROBLEM — I25.10 ASCVD (ARTERIOSCLEROTIC CARDIOVASCULAR DISEASE): Status: RESOLVED | Noted: 2022-09-06 | Resolved: 2022-10-16

## 2022-10-17 ENCOUNTER — OFFICE VISIT (OUTPATIENT)
Dept: FAMILY MEDICINE | Facility: CLINIC | Age: 77
End: 2022-10-17
Payer: COMMERCIAL

## 2022-10-17 VITALS
RESPIRATION RATE: 16 BRPM | DIASTOLIC BLOOD PRESSURE: 70 MMHG | TEMPERATURE: 98.3 F | HEIGHT: 68 IN | SYSTOLIC BLOOD PRESSURE: 132 MMHG | BODY MASS INDEX: 23.75 KG/M2 | OXYGEN SATURATION: 96 % | WEIGHT: 156.7 LBS | HEART RATE: 82 BPM

## 2022-10-17 DIAGNOSIS — I35.0 AORTIC VALVE STENOSIS, ETIOLOGY OF CARDIAC VALVE DISEASE UNSPECIFIED: ICD-10-CM

## 2022-10-17 DIAGNOSIS — H25.9 AGE-RELATED CATARACT OF BOTH EYES, UNSPECIFIED AGE-RELATED CATARACT TYPE: ICD-10-CM

## 2022-10-17 DIAGNOSIS — F34.1 DYSTHYMIA: ICD-10-CM

## 2022-10-17 DIAGNOSIS — E03.9 HYPOTHYROIDISM, UNSPECIFIED TYPE: ICD-10-CM

## 2022-10-17 DIAGNOSIS — Z01.818 PREOP GENERAL PHYSICAL EXAM: Primary | ICD-10-CM

## 2022-10-17 LAB — TSH SERPL DL<=0.005 MIU/L-ACNC: 0.89 UIU/ML (ref 0.3–4.2)

## 2022-10-17 PROCEDURE — 99214 OFFICE O/P EST MOD 30 MIN: CPT | Performed by: FAMILY MEDICINE

## 2022-10-17 PROCEDURE — 84443 ASSAY THYROID STIM HORMONE: CPT | Performed by: FAMILY MEDICINE

## 2022-10-17 PROCEDURE — 36415 COLL VENOUS BLD VENIPUNCTURE: CPT | Performed by: FAMILY MEDICINE

## 2022-10-17 ASSESSMENT — PAIN SCALES - GENERAL: PAINLEVEL: NO PAIN (0)

## 2022-10-17 NOTE — PROGRESS NOTES
[facility]  [address]  [facility] [address]  Phone: [phone]  Fax: [phone]  Primary Provider: Madhu Kidd  Pre-op Performing Provider: MADHU KIDD    {Provider  Link to PREOP SmartSet  Use this to apply standard patient instructions to AVS; includes medication directions, common orders, guidelines for anemia, warfarin, additional testing   :[id number]}  PREOPERATIVE EVALUATION:  Today's date: 10/17/2022    Iesha Patel is a 77 year old female who presents for a preoperative evaluation.    Surgical Information:  Surgery/Procedure: cataract  Surgery Location: [facility]  Surgeon: Dr Rodriguez  Surgery Date: 11-3-22 rt eye and left eye 11-17-22  Time of Surgery: To be determined  Where patient plans to recover: At home with family  Fax number for surgical facility: [phone]    Type of Anesthesia Anticipated: Choice    Assessment & Plan     The proposed surgical procedure is considered LOW risk.    Preop general physical exam  No significant contraindications to surgery identified.  She may proceed with surgery as scheduled without further clinical clarification or evaluation and may proceed with choice of anesthesia.  She will take her usual medications on morning of surgery with a sip of water.    Age-related cataract of both eyes, unspecified age-related cataract type  To proceed with surgery as scheduled.    Dysthymia  Continue fluoxetine at current dose.    Aortic valve stenosis, etiology of cardiac valve disease unspecified  Mild.  No intervention necessary.  Noted murmur on exam.    Hypothyroidism, unspecified type  Clinically euthyroid.  Continue levothyroxine.  We will check follow-up TSH level today.  - TSH with free T4 reflex; Future         Risks and Recommendations:  The patient has the following additional risks and recommendations for perioperative complications:   - No identified additional risk factors other than previously  addressed    Medication Instructions:  Patient is to take all scheduled medications on the day of surgery    RECOMMENDATION:  APPROVAL GIVEN to proceed with proposed procedure, without further diagnostic evaluation.                      Subjective     HPI related to upcoming procedure: Progressive visual disruption due to bilateral cataracts, requiring surgical intervention.  Taking Linzess and management of chronic constipation and tolerating well.  Remains on levothyroxine and management of hypothyroidism.  We increased that dose last month, needing follow-up labs today.  Remains on fluoxetine and management of dysthymia which has been stable.  Known mild aortic stenosis.    Preop Questions 10/17/2022   1. Have you ever had a heart attack or stroke? No   2. Have you ever had surgery on your heart or blood vessels, such as a stent placement, a coronary artery bypass, or surgery on an artery in your head, neck, heart, or legs? No   3. Do you have chest pain with activity? No   4. Do you have a history of  heart failure? No   5. Do you currently have a cold, bronchitis or symptoms of other infection? No   6. Do you have a cough, shortness of breath, or wheezing? No   7. Do you or anyone in your family have previous history of blood clots? No   8. Do you or does anyone in your family have a serious bleeding problem such as prolonged bleeding following surgeries or cuts? No   9. Have you ever had problems with anemia or been told to take iron pills? No   10. Have you had any abnormal blood loss such as black, tarry or bloody stools, or abnormal vaginal bleeding? No   11. Have you ever had a blood transfusion? YES - after hip surgery   11a. Have you ever had a transfusion reaction? No   12. Are you willing to have a blood transfusion if it is medically needed before, during, or after your surgery? Yes   13. Have you or any of your relatives ever had problems with anesthesia? No   14. Do you have sleep apnea, excessive  snoring or daytime drowsiness? No   15. Do you have any artifical heart valves or other implanted medical devices like a pacemaker, defibrillator, or continuous glucose monitor? No   16. Do you have artificial joints? YES -  Left hip   17. Are you allergic to latex? No       Health Care Directive:  Patient has a Health Care Directive on file      Preoperative Review of :   reviewed - no record of controlled substances prescribed.    Review of Systems  Constitutional, neuro, ENT, endocrine, pulmonary, cardiac, gastrointestinal, genitourinary, musculoskeletal, integument and psychiatric systems are negative, except as otherwise noted.    Patient Active Problem List    Diagnosis Date Noted     Dysthymia 09/06/2022     Priority: Medium     Hypothyroidism 12/09/2021     Priority: Medium     Lumbar spinal stenosis 12/09/2021     Priority: Medium     Chronic Constipation      Priority: Medium     Longstanding  After Cholecystectomy  Miralax, MOM         Insomnia      Priority: Medium     Longstanding, getting worse  Trouble with sleep onset, sleep maintenance  Melatonin  Trazodone, Feb 2018, dizzy, dry mouth, minimal help  Gabapentin-most helpful         Osteopenia 04/10/2017     Priority: Medium     DEXA 2017         Aortic valve stenosis, etiology of cardiac valve disease unspecified 04/10/2017     Priority: Medium     Diagnosed April 2017, 2019, stable  Heart murmur  Mild  Watchful waiting          Past Medical History:   Diagnosis Date     Atrial fibrillation (H)     single episode never repeated, two separate monitor instances     Endometrial cancer (H) 2000     Insomnia      Past Surgical History:   Procedure Laterality Date     BACK SURGERY  2/16/18    spinal stenosis with lumbar claudication and synovial cyst removal. Dr. Lencho Lindsey at [facility]     CHOLECYSTECTOMY  1991    Description: Cholecystectomy;  Recorded: 03/01/2013;  Comments: 1991 by Dr. Tucker     HYSTERECTOMY  2000    Secondary to  "endometrial cancer.     LUMBAR FUSION  02/2018    L4 through S1     LUMBAR SPINE SURGERY  1985    Right sciatic, lumbar laminectomy.     MOLE REMOVAL  09/13/2011    seborrheic keratosis removed from her right anterior abdominal wall     OOPHORECTOMY Bilateral 2000     TOTAL HIP ARTHROPLASTY Left 01/05/2011    Dr. Albert at      TUBAL LIGATION  1980          Current Outpatient Medications   Medication Sig Dispense Refill     ASPIRIN NOT PRESCRIBED (INTENTIONAL) Please choose reason not prescribed from choices below.       FLUoxetine (PROZAC) 10 MG tablet TAKE ONE TABLET BY MOUTH ONE TIME DAILY 90 tablet 1     levothyroxine (SYNTHROID/LEVOTHROID) 75 MCG tablet Take 1 tablet (75 mcg) by mouth daily 90 tablet 3     linaclotide (LINZESS) 72 MCG capsule Take 1 capsule by mouth every morning (before breakfast)       methylcellulose (CITRUCEL) powder Take 1 Tablespoonful by mouth daily       STATIN NOT PRESCRIBED (INTENTIONAL) Please choose reason not prescribed from choices below.         Allergies   Allergen Reactions     Penicillins Hives     Age 14, hives in mouth        Social History     Tobacco Use     Smoking status: Never     Smokeless tobacco: Never   Substance Use Topics     Alcohol use: No     Family History   Problem Relation Age of Onset     Cancer Brother         testicular, bladder, kidney, skin     Diabetes Type 2  Brother      Prostate Cancer Brother      Heart Disease Brother 59.00        heart attack      Valvular heart disease Mother      Diabetes Type 2  Mother      Cerebrovascular Disease Father      Alcoholism Daughter      Diabetes Brother      Cancer Brother      Allergies Brother      History   Drug Use Unknown         Objective     /70   Pulse 82   Temp 98.3  F (36.8  C) (Oral)   Resp 16   Ht 1.715 m (5' 7.5\")   Wt 71.1 kg (156 lb 11.2 oz)   SpO2 96%   BMI 24.18 kg/m      Physical Exam    GENERAL APPEARANCE: healthy, alert and no distress     EYES: EOMI, PERRL     HENT: ear canals " and TM's normal and nose and mouth without ulcers or lesions; partial dentures     NECK: no adenopathy, no asymmetry, masses, or scars and thyroid normal to palpation     RESP: lungs clear to auscultation - no rales, rhonchi or wheezes     CV: regular rates and rhythm, normal S1 S2, no S3 or S4 and no murmur, click or rub     ABDOMEN:  soft, nontender, no HSM or masses and bowel sounds normal     MS: extremities normal- no gross deformities noted, no evidence of inflammation in joints, FROM in all extremities.     SKIN: no suspicious lesions or rashes     NEURO: Normal strength and tone, sensory exam grossly normal, mentation intact and speech normal     PSYCH: mentation appears normal. and affect normal/bright     LYMPHATICS: No cervical adenopathy    Recent Labs   Lab Test 12/09/21  1108 04/06/21  1205   HGB  --  13.1   * 138   POTASSIUM 4.4 4.6   CR 0.80 0.78        Diagnostics:  No labs were ordered during this visit.   No EKG required for low risk surgery (cataract, skin procedure, breast biopsy, etc).  No EKG required, no history of coronary heart disease, significant arrhythmia, peripheral arterial disease or other structural heart disease.    Revised Cardiac Risk Index (RCRI):  The patient has the following serious cardiovascular risks for perioperative complications:   - No serious cardiac risks = 0 points     RCRI Interpretation: 0 points: Class I (very low risk - 0.4% complication rate)           Signed Electronically by: Edie Esqueda MD  Copy of this evaluation report is provided to requesting physician.

## 2022-11-23 DIAGNOSIS — F34.1 DYSTHYMIA: ICD-10-CM

## 2022-11-23 DIAGNOSIS — E03.9 HYPOTHYROIDISM, UNSPECIFIED TYPE: ICD-10-CM

## 2022-11-24 RX ORDER — LEVOTHYROXINE SODIUM 50 UG/1
TABLET ORAL
Qty: 90 TABLET | Refills: 0 | OUTPATIENT
Start: 2022-11-24

## 2022-11-24 RX ORDER — FLUOXETINE 10 MG/1
TABLET, FILM COATED ORAL
Qty: 90 TABLET | Refills: 0 | Status: SHIPPED | OUTPATIENT
Start: 2022-11-24 | End: 2023-02-24

## 2022-11-25 NOTE — TELEPHONE ENCOUNTER
"Last Written Prescription Date:  6/7/2022  Last Fill Quantity: 90,  # refills: 1   Last office visit provider:  10/17/2022     Requested Prescriptions   Pending Prescriptions Disp Refills     FLUoxetine (PROZAC) 10 MG tablet [Pharmacy Med Name: FLUoxetine HCl Oral Tablet 10 MG] 90 tablet 0     Sig: TAKE ONE TABLET BY MOUTH ONE TIME DAILY       SSRIs Protocol Passed - 11/23/2022  9:26 AM        Passed - PHQ-9 score less than 5 in past 6 months     Please review last PHQ-9 score.           Passed - Medication is active on med list        Passed - Patient is age 18 or older        Passed - No active pregnancy on record        Passed - No positive pregnancy test in last 12 months        Passed - Recent (6 mo) or future (30 days) visit within the authorizing provider's specialty     Patient had office visit in the last 6 months or has a visit in the next 30 days with authorizing provider or within the authorizing provider's specialty.  See \"Patient Info\" tab in inbasket, or \"Choose Columns\" in Meds & Orders section of the refill encounter.                 Monica Bocanegra RN 11/24/22 7:03 PM  "

## 2022-11-25 NOTE — TELEPHONE ENCOUNTER
"Per the notes on 9/9/22: increase your levothyroxine dose to 75 mcg daily, then recheck your thyroid level at a lab only visit in about 6 weeks or so.      For the 75mg tablets:  Last Written Prescription Date:  9/10/22  Last Fill Quantity: 90,  # refills: 3   Last office visit provider:  10/17/22    Requested Prescriptions   Pending Prescriptions Disp Refills     levothyroxine (SYNTHROID/LEVOTHROID) 50 MCG tablet [Pharmacy Med Name: Levothyroxine Sodium Oral Tablet 50 MCG] 90 tablet 0     Sig: TAKE ONE TABLET BY MOUTH ONE TIME DAILY       Thyroid Protocol Passed - 11/24/2022  7:00 PM        Passed - Patient is 12 years or older        Passed - Recent (12 mo) or future (30 days) visit within the authorizing provider's specialty     Patient has had an office visit with the authorizing provider or a provider within the authorizing providers department within the previous 12 mos or has a future within next 30 days. See \"Patient Info\" tab in inbasket, or \"Choose Columns\" in Meds & Orders section of the refill encounter.              Passed - Medication is active on med list        Passed - Normal TSH on file in past 12 months     Recent Labs   Lab Test 10/17/22  1622   TSH 0.89              Passed - No active pregnancy on record     If patient is pregnant or has had a positive pregnancy test, please check TSH.          Passed - No positive pregnancy test in past 12 months     If patient is pregnant or has had a positive pregnancy test, please check TSH.               KORY MORGAN RN 11/24/22 7:01 PM  "
Risks/benefits discussed with patient or patient surrogate

## 2023-01-29 ENCOUNTER — HEALTH MAINTENANCE LETTER (OUTPATIENT)
Age: 78
End: 2023-01-29

## 2023-02-05 ENCOUNTER — MYC MEDICAL ADVICE (OUTPATIENT)
Dept: FAMILY MEDICINE | Facility: CLINIC | Age: 78
End: 2023-02-05
Payer: COMMERCIAL

## 2023-02-05 DIAGNOSIS — E03.9 HYPOTHYROIDISM, UNSPECIFIED TYPE: Primary | ICD-10-CM

## 2023-02-08 ENCOUNTER — LAB (OUTPATIENT)
Dept: LAB | Facility: CLINIC | Age: 78
End: 2023-02-08
Payer: COMMERCIAL

## 2023-02-08 DIAGNOSIS — E03.9 HYPOTHYROIDISM, UNSPECIFIED TYPE: ICD-10-CM

## 2023-02-08 LAB — TSH SERPL DL<=0.005 MIU/L-ACNC: 0.89 UIU/ML (ref 0.3–4.2)

## 2023-02-08 PROCEDURE — 36415 COLL VENOUS BLD VENIPUNCTURE: CPT

## 2023-02-08 PROCEDURE — 84443 ASSAY THYROID STIM HORMONE: CPT

## 2023-02-17 ENCOUNTER — TRANSFERRED RECORDS (OUTPATIENT)
Dept: HEALTH INFORMATION MANAGEMENT | Facility: CLINIC | Age: 78
End: 2023-02-17

## 2023-02-23 DIAGNOSIS — F34.1 DYSTHYMIA: ICD-10-CM

## 2023-02-24 RX ORDER — FLUOXETINE 10 MG/1
TABLET, FILM COATED ORAL
Qty: 90 TABLET | Refills: 0 | Status: SHIPPED | OUTPATIENT
Start: 2023-02-24 | End: 2023-05-23

## 2023-02-24 NOTE — TELEPHONE ENCOUNTER
Prescription approved per [facility] Refill Protocol.  HATTIE DallasN, RN  [facility]

## 2023-03-06 ENCOUNTER — TRANSFERRED RECORDS (OUTPATIENT)
Dept: HEALTH INFORMATION MANAGEMENT | Facility: CLINIC | Age: 78
End: 2023-03-06

## 2023-03-10 ENCOUNTER — NURSE TRIAGE (OUTPATIENT)
Dept: NURSING | Facility: CLINIC | Age: 78
End: 2023-03-10
Payer: COMMERCIAL

## 2023-03-10 DIAGNOSIS — U07.1 INFECTION DUE TO 2019 NOVEL CORONAVIRUS: Primary | ICD-10-CM

## 2023-03-10 NOTE — TELEPHONE ENCOUNTER
Provider Recommendation Follow Up:   Reached patient/caregiver. Informed of provider's recommendations. Patient verbalized understanding and agrees with the plan.      Nurse Triage SBAR    Is this a 2nd Level Triage? NO    Situation:   Patient tested positive for COVID 3/9, sx began 3/8    Background:  Risk factors: >50 years old, aortic valve stenosis, a. Fib  Labs - No LFTs in last 6 months, hx GFR >60    Assessment:   Cough, sore throat, headache, congestion    Protocol Recommended Disposition:   Discuss With PCP And Callback By Nurse Within 1 Hour    Recommendation:   COVID 19 Tx protocol initiated   Order sent to pharmacy      Does the patient meet one of the following criteria for ADS visit consideration? 16+ years old, with an [facility] PCP     TIP  Providers, please consider if this condition is appropriate for management at one of our Acute and Diagnostic Services sites.     If patient is a good candidate, please use dotphrase <dot>triageresponse and select Refer to ADS to document.    RN COVID TREATMENT VISIT  03/10/23      The patient has been triaged and does not require a higher level of care.    Iesha JUAN JOSÉ Patel  77 year old  Current weight? 156 lbs     Has the patient been seen by a primary care provider at an [facility] or [email or web address] within the past two years? Yes.   Have you been in close proximity to/do you have a known exposure to a person with a confirmed case of influenza? No.     General treatment eligibility:  Date of positive COVID test (PCR or at home)?  3/9/23    Are you or have you been hospitalized for this COVID-19 infection? No.   Have you received monoclonal antibodies or antiviral treatment for COVID-19 since this positive test? No.   Do you have any of the following conditions that place you at risk of being very sick from COVID-19?   - Age 50 years or older  - Heart conditions such as cardiomyopathies, congenital heart defects, coronary artery disease, heart  arrhythmias, heart failure, hypertension, valve disorders   Yes, patient has at least one high risk condition as noted above.     Current COVID symptoms:   - cough  - fatigue  - headache  - sore throat  - congestion or runny nose  Yes. Patient has at least one symptom as selected.     How many days since symptoms started? 5 days or less. Established patient, 12 years or older weighing at least 88.2 lbs, who has symptoms that started in the past 5 days, has not been hospitalized nor received treatment already, and is at risk for being very sick from COVID-19.     Treatment eligibility by RN:    Are you currently pregnant or nursing? No    Do you have a clinically significant hypersensitivity to nirmatrelvir or ritonavir, or toxic epidermal necrolysis (TEN) or Ortega-Huber Syndrome? No    Do you have a history of hepatitis, any hepatic impairment on the Problem List (such as Child-Costello Class C, cirrhosis, fatty liver disease, alcoholic liver disease), or was the last liver lab (hepatic panel, ALT, AST, ALK Phos, bilirubin) elevated in the past 6 months? No    Do you have any history of severe renal impairment (eGFR < 30mL/min)? No    Is patient eligible to continue?   Yes, patient meets all eligibility requirements for the RN COVID treatment (as denoted by all no responses above).     Current Outpatient Medications   Medication Sig Dispense Refill     ASPIRIN NOT PRESCRIBED (INTENTIONAL) Please choose reason not prescribed from choices below.       FLUoxetine (PROZAC) 10 MG tablet TAKE ONE TABLET BY MOUTH ONE TIME DAILY 90 tablet 0     levothyroxine (SYNTHROID/LEVOTHROID) 75 MCG tablet Take 1 tablet (75 mcg) by mouth daily 90 tablet 3     linaclotide (LINZESS) 72 MCG capsule Take 1 capsule by mouth every morning (before breakfast)       methylcellulose (CITRUCEL) powder Take 1 Tablespoonful by mouth daily       STATIN NOT PRESCRIBED (INTENTIONAL) Please choose reason not prescribed from choices below.          Medications from List 1 of the standing order (on medications that exclude the use of Paxlovid) that patient is taking: NONE. Is patient taking Marvel's Wort? No  Is patient taking Isha's Wort or any meds from List 1? No.   Medications from List 2 of the standing order (on meds that provider needs to adjust) that patient is taking: NONE. Is patient on any of the meds from List 2? No.   Medications from List 3 of standing order (on meds that a RN needs to adjust) that patient is taking: NONE. Is patient on any meds from List 3? No.     Paxlovid has an approximate 90% reduction in hospitalization. Paxlovid can possibly cause altered sense of taste, diarrhea (loose, watery stools), high blood pressure, muscle aches.     Would patient like a Paxlovid prescription?   Yes.   Lab Results   Component Value Date    GFRESTIMATED 73 12/09/2021       Was last eGFR reduced? No, eGFR 60 or greater/ No Result on record. Patient can receive the normal renal function dose. Paxlovid Rx sent to [place] [facility]   [facility]     Temporary change to home medications: None    All medication adjustments (holds, etc) were discussed with the patient and patient was asked to repeat back (teachback) their med adjustment.  Did patient understand med adjustment? No medication adjustments needed.         Reviewed the following instructions with the patient:    Paxlovid (nimatrelvir and ritonavir)    How it works  Two medicines (nirmatrelvir and ritonavir) are taken together. They stop the virus from growing. Less amount of virus is easier for your body to fight.    How to take    Medicine comes in a daily container with both medicine tablets. Take by mouth twice daily (once in the morning, once at night) for 5 days.    The number of tablets to take varies by patient.    Don't chew or break capsules. Swallow whole.    When to take  Take as soon as possible after positive COVID-19 test result, and within 5 days of your first  symptoms.    Possible side effects  Can cause altered sense of taste, diarrhea (loose, watery stools), high blood pressure, muscle aches.    Rema Calle, RN       eRma Calle, HATTIEN, RN  [facility]

## 2023-03-10 NOTE — TELEPHONE ENCOUNTER
Nurse Triage SBAR    Is this a 2nd Level Triage? YES, LICENSED PRACTITIONER REVIEW IS REQUIRED    Situation: Patient calling with Covid positive with sx .  Consent: not needed    Assessment:   Tested positive for Covid last night.   Wednesday evening is when sx began.   SX:  Sore throat - slight headache - cold symptoms - nasal congestion -   DENIES:  Fever - body aches - loss of smell and taste -   Worst sx is sore throat.    Denies fever  Denies SOB, breathing concern - states not wheezing.   * Compared to yesterday, pt states they are the same.    * Pt has had all vaccines and boosters - Moderna.    * HIGH RISK DISEASE:  Denies any.   Pt is 77 years old.    *     Protocol Recommended Disposition:   Discuss with PCP and nurse call back within 1 hour.     Recommendation: Advised patient to discuss with providers office when they call back.   . Reviewed concerning symptoms and when to call back.     Routed to provider    Does the patient meet one of the following criteria for ADS visit consideration? 16+ years old, with an [facility] PCP     TIP  Providers, please consider if this condition is appropriate for management at one of our Acute and Diagnostic Services sites.     If patient is a good candidate, please use dotphrase <dot>triageresponse and select Refer to ADS to document.         Georgia Simon RN [facility] 3/10/2023 7:49 AM                              Reason for Disposition    [1] HIGH RISK for severe COVID complications (e.g., weak immune system, age > 64 years, obesity with BMI of 30 or higher, pregnant, chronic lung disease or other chronic medical condition) AND [2] COVID symptoms (e.g., cough, fever)  (Exceptions: Already seen by PCP and no new or worsening symptoms.)    Additional Information    Negative: SEVERE difficulty breathing (e.g., struggling for each breath, speaks in single words)    Negative: Difficult to awaken or acting confused (e.g., disoriented, slurred speech)     Negative: Bluish (or gray) lips or face now    Negative: Shock suspected (e.g., cold/pale/clammy skin, too weak to stand, low BP, rapid pulse)    Negative: Sounds like a life-threatening emergency to the triager    Negative: [1] Diagnosed or suspected COVID-19 AND [2] symptoms lasting 3 or more weeks    Negative: [1] COVID-19 exposure AND [2] no symptoms    Negative: COVID-19 vaccine reaction suspected (e.g., fever, headache, muscle aches) occurring 1 to 3 days after getting vaccine    Negative: COVID-19 vaccine, questions about    Negative: [1] Lives with someone known to have influenza (flu test positive) AND [2] flu-like symptoms (e.g., cough, runny nose, sore throat, SOB; with or without fever)    Negative: [1] Adult with possible COVID-19 symptoms AND [2] triager concerned about severity of symptoms or other causes    Negative: COVID-19 and breastfeeding, questions about    Negative: SEVERE or constant chest pain or pressure  (Exception: Mild central chest pain, present only when coughing.)    Negative: MODERATE difficulty breathing (e.g., speaks in phrases, SOB even at rest, pulse 100-120)    Negative: Headache and stiff neck (can't touch chin to chest)    Negative: Oxygen level (e.g., pulse oximetry) 90 percent or lower    Negative: Chest pain or pressure  (Exception: MILD central chest pain, present only when coughing)    Negative: Patient sounds very sick or weak to the triager    Negative: MILD difficulty breathing (e.g., minimal/no SOB at rest, SOB with walking, pulse <100)    Negative: Fever > 103 F (39.4 C)    Negative: [1] Fever > 101 F (38.3 C) AND [2] over 60 years of age    Negative: [1] Fever > 100.0 F (37.8 C) AND [2] bedridden (e.g., nursing home patient, CVA, chronic illness, recovering from surgery)    Protocols used: CORONAVIRUS (COVID-19) DIAGNOSED OR [id number]

## 2023-03-20 ENCOUNTER — MYC MEDICAL ADVICE (OUTPATIENT)
Dept: FAMILY MEDICINE | Facility: CLINIC | Age: 78
End: 2023-03-20
Payer: COMMERCIAL

## 2023-03-20 NOTE — TELEPHONE ENCOUNTER
Writer called patient and advised that she continue to wear a mask in public and offered a virtual visit for further recommendations from a provider.     Patient agreed and virtual visit scheduled for 3/21/23.     LUCI Wcik, RN  [facility]

## 2023-03-21 ENCOUNTER — VIRTUAL VISIT (OUTPATIENT)
Dept: FAMILY MEDICINE | Facility: CLINIC | Age: 78
End: 2023-03-21
Payer: COMMERCIAL

## 2023-03-21 DIAGNOSIS — U07.1 INFECTION DUE TO 2019 NOVEL CORONAVIRUS: Primary | ICD-10-CM

## 2023-03-21 PROCEDURE — 99213 OFFICE O/P EST LOW 20 MIN: CPT | Mod: VID | Performed by: NURSE PRACTITIONER

## 2023-03-21 ASSESSMENT — PATIENT HEALTH QUESTIONNAIRE - PHQ9
10. IF YOU CHECKED OFF ANY PROBLEMS, HOW DIFFICULT HAVE THESE PROBLEMS MADE IT FOR YOU TO DO YOUR WORK, TAKE CARE OF THINGS AT HOME, OR GET ALONG WITH OTHER PEOPLE: NOT DIFFICULT AT ALL
SUM OF ALL RESPONSES TO PHQ QUESTIONS 1-9: 1
SUM OF ALL RESPONSES TO PHQ QUESTIONS 1-9: 1

## 2023-03-21 NOTE — PROGRESS NOTES
Iesha is a 77 year old who is being evaluated via a billable video visit.      How would you like to obtain your AVS? MyChart  If the video visit is dropped, the invitation should be resent by: Text to cell phone: [phone]  Will anyone else be joining your video visit? No          Assessment & Plan     Infection due to 2019 novel coronavirus  We discussed that patient's current symptoms may be a rebound COVID infection following use of Paxlovid.  Her symptoms at this point are mostly sinus related.  I recommend treating conservatively with over-the-counter medications including Mucinex, Tylenol and getting adequate rest and hydration.  We reviewed quarantine guidelines and symptoms that would require follow-up evaluation.  She will continue to closely monitor symptoms.      No follow-ups on file.    LORENZO Chan [facility]   Iesha is a 77 year old, presenting for the following health issues:  Covid Concern (This day 12 this has been going on. Just congested running nose - feeling tired )      HPI     Patient is here today with concerns of COVID-19 symptoms.  She tested positive initially for COVID 12 days ago.  At the time had symptoms of body aches, chills.  She was given a prescription for Paxlovid which she finished 5 days ago.  2 days ago, she developed sinus symptoms including congestion, runny nose and fatigue.  She states that she feels worse in many ways than she did when she initially tested positive for COVID.  She did take another COVID test recently which was positive again.  She denies any persistent fevers or body aches at this point.  No difficulty breathing or shortness of breath.  She quarantined for the 5 days after her initial positive test and is wondering what to do about quarantining now.        Review of Systems   Review of Systems - pertinent positives noted in HPI, otherwise ROS is negative.        Objective    Vitals - Patient  "Reported  Weight (Patient Reported): 72.6 kg (160 lb)  Height (Patient Reported): 172.7 cm (5' 8\")  BMI (Based on Pt Reported Ht/Wt): 24.33  Temperature (Patient Reported): 98.6  F (37  C)  Pain Score: No Pain (0)        Physical Exam   GENERAL: Healthy, alert and no distress  EYES: Eyes grossly normal to inspection.  No discharge or erythema, or obvious scleral/conjunctival abnormalities.  RESP: No audible wheeze, cough, or visible cyanosis.  No visible retractions or increased work of breathing.    SKIN: Visible skin clear. No significant rash, abnormal pigmentation or lesions.  NEURO: Cranial nerves grossly intact.  Mentation and speech appropriate for age.  PSYCH: Mentation appears normal, affect normal/bright, judgement and insight intact, normal speech and appearance well-groomed.            Video-Visit Details    Type of service:  Video Visit   Video Start Time: 8:53 am  Video End Time:9:03    Originating Location (pt. Location): Home    Distant Location (provider location):  Off-site  Platform used for Video Visit: Humberto"

## 2023-03-23 ENCOUNTER — NURSE TRIAGE (OUTPATIENT)
Dept: NURSING | Facility: CLINIC | Age: 78
End: 2023-03-23
Payer: COMMERCIAL

## 2023-03-23 NOTE — TELEPHONE ENCOUNTER
Pt is phoning stating that she has been having COVID for the past 14 days and has completed Paxlovid     Pt is having chest pain when coughing     No fever     Pt would like to be seen to ensure that she is not getting pneumonia     Per disposition: See in Office Today or Tomorrow    Transferred to scheduling     Care advice given per protocol and when to call back. Pt verbalized understanding and agrees to plan of care.    Radha Winkler RN  [facility]  7:57 AM 3/23/2023        Reason for Disposition    Patient wants to be seen    Additional Information    Negative: Bluish (or gray) lips or face    Negative: SEVERE difficulty breathing (e.g., struggling for each breath, speaks in single words)    Negative: Rapid onset of cough and has hives    Negative: Coughing started suddenly after medicine, an allergic food or bee sting    Negative: Difficulty breathing after exposure to flames, smoke, or fumes    Negative: Sounds like a life-threatening emergency to the triager    Negative: Previous asthma attacks and this feels like asthma attack    Negative: Dry cough (non-productive; no sputum or minimal clear sputum) and within 14 days of COVID-19 Exposure    Negative: MODERATE difficulty breathing (e.g., speaks in phrases, SOB even at rest, pulse 100-120) and still present when not coughing    Negative: Chest pain present when not coughing    Negative: Passed out (i.e., fainted, collapsed and was not responding)    Negative: Patient sounds very sick or weak to the triager    Negative: MILD difficulty breathing (e.g., minimal/no SOB at rest, SOB with walking, pulse <100) and still present when not coughing    Negative: Coughed up > 1 tablespoon (15 ml) blood (Exception: Blood-tinged sputum.)    Negative: Increasing ankle swelling    Negative: Fever > 100.0 F (37.8 C) and bedridden (e.g., nursing home patient, stroke, chronic illness, recovering from surgery)    Negative: Fever > 100.0 F (37.8 C) and has  diabetes mellitus or a weak immune system (e.g., HIV positive, cancer chemotherapy, organ transplant, splenectomy, chronic steroids)    Negative: Fever > 103 F (39.4 C)    Negative: Fever > 101 F (38.3 C) and over 60 years of age    Negative: Wheezing is present    Negative: SEVERE coughing spells (e.g., whooping sound after coughing, vomiting after coughing)    Negative: Coughing up albert-colored (reddish-brown) or blood-tinged sputum    Negative: Fever present > 3 days (72 hours)    Negative: Fever returns after gone for over 24 hours and symptoms worse or not improved    Negative: Using nasal washes and pain medicine > 24 hours and sinus pain persists    Negative: Known COPD or other severe lung disease (i.e., bronchiectasis, cystic fibrosis, lung surgery) and worsening symptoms (i.e., increased sputum purulence or amount, increased breathing difficulty)    Negative: Continuous (nonstop) coughing interferes with work or school and no improvement using cough treatment per Care Advice    Protocols used: COUGH-A-OH

## 2023-03-28 ENCOUNTER — TRANSFERRED RECORDS (OUTPATIENT)
Dept: HEALTH INFORMATION MANAGEMENT | Facility: CLINIC | Age: 78
End: 2023-03-28

## 2023-04-11 ENCOUNTER — TRANSFERRED RECORDS (OUTPATIENT)
Dept: HEALTH INFORMATION MANAGEMENT | Facility: CLINIC | Age: 78
End: 2023-04-11
Payer: COMMERCIAL

## 2023-04-19 ASSESSMENT — ENCOUNTER SYMPTOMS
PARESTHESIAS: 0
DIZZINESS: 0
CHILLS: 0
WEAKNESS: 0
SORE THROAT: 0
DIARRHEA: 0
COUGH: 0
DYSURIA: 0
SHORTNESS OF BREATH: 0
NAUSEA: 0
CONSTIPATION: 1
JOINT SWELLING: 0
FEVER: 0
MYALGIAS: 0
PALPITATIONS: 0
HEADACHES: 0
HEARTBURN: 1
HEMATOCHEZIA: 0
NERVOUS/ANXIOUS: 0
EYE PAIN: 0
HEMATURIA: 0
BREAST MASS: 0
ARTHRALGIAS: 1
ABDOMINAL PAIN: 0
FREQUENCY: 0

## 2023-04-19 ASSESSMENT — ACTIVITIES OF DAILY LIVING (ADL): CURRENT_FUNCTION: NO ASSISTANCE NEEDED

## 2023-04-21 ENCOUNTER — TRANSFERRED RECORDS (OUTPATIENT)
Dept: HEALTH INFORMATION MANAGEMENT | Facility: CLINIC | Age: 78
End: 2023-04-21
Payer: COMMERCIAL

## 2023-04-24 ENCOUNTER — OFFICE VISIT (OUTPATIENT)
Dept: FAMILY MEDICINE | Facility: CLINIC | Age: 78
End: 2023-04-24
Payer: COMMERCIAL

## 2023-04-24 VITALS
DIASTOLIC BLOOD PRESSURE: 72 MMHG | SYSTOLIC BLOOD PRESSURE: 128 MMHG | TEMPERATURE: 97.8 F | RESPIRATION RATE: 16 BRPM | HEIGHT: 68 IN | OXYGEN SATURATION: 97 % | WEIGHT: 160.3 LBS | BODY MASS INDEX: 24.29 KG/M2 | HEART RATE: 74 BPM

## 2023-04-24 DIAGNOSIS — E03.9 HYPOTHYROIDISM, UNSPECIFIED TYPE: ICD-10-CM

## 2023-04-24 DIAGNOSIS — K59.00 CONSTIPATION, UNSPECIFIED CONSTIPATION TYPE: ICD-10-CM

## 2023-04-24 DIAGNOSIS — F34.1 DYSTHYMIA: ICD-10-CM

## 2023-04-24 DIAGNOSIS — F51.01 PRIMARY INSOMNIA: ICD-10-CM

## 2023-04-24 DIAGNOSIS — M48.061 SPINAL STENOSIS OF LUMBAR REGION, UNSPECIFIED WHETHER NEUROGENIC CLAUDICATION PRESENT: ICD-10-CM

## 2023-04-24 DIAGNOSIS — H04.123 DRY EYES: ICD-10-CM

## 2023-04-24 DIAGNOSIS — I35.0 AORTIC VALVE STENOSIS, ETIOLOGY OF CARDIAC VALVE DISEASE UNSPECIFIED: ICD-10-CM

## 2023-04-24 DIAGNOSIS — M85.80 OSTEOPENIA, UNSPECIFIED LOCATION: ICD-10-CM

## 2023-04-24 DIAGNOSIS — Z00.00 MEDICARE ANNUAL WELLNESS VISIT, SUBSEQUENT: Primary | ICD-10-CM

## 2023-04-24 DIAGNOSIS — Z78.0 ASYMPTOMATIC POSTMENOPAUSAL STATUS: ICD-10-CM

## 2023-04-24 DIAGNOSIS — Z12.31 VISIT FOR SCREENING MAMMOGRAM: ICD-10-CM

## 2023-04-24 LAB
CHOLEST SERPL-MCNC: 191 MG/DL
HDLC SERPL-MCNC: 97 MG/DL
LDLC SERPL CALC-MCNC: 81 MG/DL
NONHDLC SERPL-MCNC: 94 MG/DL
TRIGL SERPL-MCNC: 63 MG/DL

## 2023-04-24 PROCEDURE — 80061 LIPID PANEL: CPT | Performed by: FAMILY MEDICINE

## 2023-04-24 PROCEDURE — 36415 COLL VENOUS BLD VENIPUNCTURE: CPT | Performed by: FAMILY MEDICINE

## 2023-04-24 PROCEDURE — 82306 VITAMIN D 25 HYDROXY: CPT | Performed by: FAMILY MEDICINE

## 2023-04-24 PROCEDURE — G0439 PPPS, SUBSEQ VISIT: HCPCS | Performed by: FAMILY MEDICINE

## 2023-04-24 RX ORDER — FLUOROMETHOLONE 0.1 %
SUSPENSION, DROPS(FINAL DOSAGE FORM)(ML) OPHTHALMIC (EYE) DAILY
COMMUNITY
Start: 2023-04-17 | End: 2023-04-24

## 2023-04-24 ASSESSMENT — ENCOUNTER SYMPTOMS
NERVOUS/ANXIOUS: 0
ARTHRALGIAS: 1
HEARTBURN: 1
HEMATURIA: 0
WEAKNESS: 0
HEMATOCHEZIA: 0
JOINT SWELLING: 0
DIZZINESS: 0
BREAST MASS: 0
EYE PAIN: 0
MYALGIAS: 0
HEADACHES: 0
NAUSEA: 0
CONSTIPATION: 1
FREQUENCY: 0
PARESTHESIAS: 0
PALPITATIONS: 0
SORE THROAT: 0
COUGH: 0
FEVER: 0
DYSURIA: 0
DIARRHEA: 0
ABDOMINAL PAIN: 0
CHILLS: 0
SHORTNESS OF BREATH: 0

## 2023-04-24 ASSESSMENT — ACTIVITIES OF DAILY LIVING (ADL): CURRENT_FUNCTION: NO ASSISTANCE NEEDED

## 2023-04-24 ASSESSMENT — PAIN SCALES - GENERAL: PAINLEVEL: NO PAIN (0)

## 2023-04-24 NOTE — PATIENT INSTRUCTIONS
Patient Education   Personalized Prevention Plan  You are due for the preventive services outlined below.  Your care team is available to assist you in scheduling these services.  If you have already completed any of these items, please share that information with your care team to update in your medical record.  Health Maintenance Due   Topic Date Due     Depression Action Plan  Never done     Osteoporosis Screening  04/27/2023     Mammogram  05/04/2023       Understanding USDA MyPlate  The USDA has guidelines to help you make healthy food choices. These are called MyPlate. MyPlate shows the food groups that make up healthy meals using the image of a place setting. Before you eat, think about the healthiest choices for what to put on your plate or in your cup or bowl. To learn more about building a healthy plate, visit www.choosemyplate.gov.     The food groups    Fruits. Any fruit or 100% fruit juice counts as part of the Fruit Group. Fruits may be fresh, canned, frozen, or dried, and may be whole, cut-up, or pureed. Make 1/2 of your plate fruits and vegetables.    Vegetables. Any vegetable or 100% vegetable juice counts as a member of the Vegetable Group. Vegetables may be fresh, frozen, canned, or dried. They can be served raw or cooked and may be whole, cut-up, or mashed. Make 1/2 of your plate fruits and vegetables.    Grains. All foods made from grains are part of the Grains Group. These include wheat, rice, oats, cornmeal, and barley. Grains are often used to make foods such as bread, pasta, oatmeal, cereal, tortillas, and grits. Grains should be no more than 1/4 of your plate. At least half of your grains should be whole grains.    Protein. This group includes meat, poultry, seafood, beans and peas, eggs, processed soy products (such as tofu), nuts (including nut butters), and seeds. Make protein choices no more than 1/4 of your plate. Meat and poultry choices should be lean or low fat.    Dairy. The  Dairy Group includes all fluid milk products and foods made from milk that contain calcium, such as yogurt and cheese. (Foods that have little calcium, such as cream, butter, and cream cheese, are not part of this group.) Most dairy choices should be low-fat or fat-free.    Oils. Oils aren't a food group, but they do contain essential nutrients. However it's important to watch your intake of oils. These are fats that are liquid at room temperature. They include canola, corn, olive, soybean, vegetable, and sunflower oil. Foods that are mainly oil include mayonnaise, certain salad dressings, and soft margarines. You likely already get your daily oil allowance from the foods you eat.  Things to limit  Eating healthy also means limiting these things in your diet:    Salt (sodium). Many processed foods have a lot of sodium. To keep sodium intake down, eat fresh vegetables, meats, poultry, and seafood when possible. Purchase low-sodium, reduced-sodium, or no-salt-added food products at the store. And don't add salt to your meals at home. Instead, season them with herbs and spices such as dill, oregano, cumin, and paprika. Or try adding flavor with lemon or lime zest and juice.    Saturated fat. Saturated fats are most often found in animal products such as beef, pork, and chicken. They are often solid at room temperature, such as butter. To reduce your saturated fat intake, choose leaner cuts of meat and poultry. And try healthier cooking methods such as grilling, broiling, roasting, or baking. For a simple lower-fat swap, use plain nonfat yogurt instead of mayonnaise when making potato salad or macaroni salad.    Added sugars. These are sugars added to foods. They are in foods such as ice cream, candy, soda, fruit drinks, sports drinks, energy drinks, cookies, pastries, jams, and syrups. Cut down on added sugars by sharing sweet treats with a family member or friend. You can also choose fruit for dessert, and drink water  or other unsweetened beverages.  [employer] last reviewed this educational content on 6/1/2020 2000-2022 [employer]. All rights reserved. This information is not intended as a substitute for professional medical care. Always follow your healthcare professional's instructions.

## 2023-04-24 NOTE — PROGRESS NOTES
"SUBJECTIVE:   Iesha is a 77 year old who presents for Preventive Visit.      9/7/2022     8:58 AM   Additional Questions   Roomed by Nichole     Patient has been advised of split billing requirements and indicates understanding: Yes  Are you in the first 12 months of your Medicare coverage?  No    Doing well overall.  Cataract surgery performed late last year, since then has had dry eyes, managing with steroid eyedrops and currently down to 1 drop/day to keep it in check.  She is also been struggling with osteoarthritis and bursitis of her left shoulder, recent steroid injection, will soon be having steroid injection for cervical spine stenosis as well, feeling well supported.  Normal TSH in February, remains compliant with levothyroxine.  Due for follow-up bone density scan with known history of osteopenia and due for vitamin D follow-up.  Chronic constipation managed with Linzess and Citrucel, will be seeing Dr. Mtz of [facility] as she is concerned she may have anal stenosis contributing to her symptoms.  Remains on fluoxetine 10 mg daily and management of her dysthymia.  Sleeping well on fluoxetine.  History of aortic stenosis that remains asymptomatic, was told she should have another echo in a few years or so in the next 1 to 2 years.    Healthy Habits:     In general, how would you rate your overall health?  Good    Frequency of exercise:  4-5 days/week    Duration of exercise:  15-30 minutes    Do you usually eat at least 4 servings of fruit and vegetables a day, include whole grains    & fiber and avoid regularly eating high fat or \"junk\" foods?  No    Taking medications regularly:  Yes    Medication side effects:  Not applicable    Ability to successfully perform activities of daily living:  No assistance needed    Home Safety:  No safety concerns identified    Hearing Impairment:  No hearing concerns    In the past 6 months, have you been bothered by leaking of urine?  No    In general, how would you " rate your overall mental or emotional health?  Good      PHQ-2 Total Score: 1    Additional concerns today:  No      Have you ever done Advance Care Planning? (For example, a Health Directive, POLST, or a discussion with a medical provider or your loved ones about your wishes): Yes, advance care planning is on file.       Fall risk  Fallen 2 or more times in the past year?: No  Any fall with injury in the past year?: No    Cognitive Screening   1) Repeat 3 items (Leader, Season, Table)    2) Clock draw: NORMAL  3) 3 item recall: Recalls 3 objects  Results: 3 items recalled: COGNITIVE IMPAIRMENT LESS LIKELY    Mini-CogTM Copyright S Bandar. Licensed by the author for use in [facility]; reprinted with permission (za@[facility]). All rights reserved.      Reviewed and updated as needed this visit by clinical staff   Tobacco  Allergies  Meds              Reviewed and updated as needed this visit by Provider                 Social History     Tobacco Use     Smoking status: Never     Smokeless tobacco: Never   Vaping Use     Vaping status: Never Used   Substance Use Topics     Alcohol use: No             4/19/2023     7:07 PM   Alcohol Use   Prescreen: >3 drinks/day or >7 drinks/week? Not Applicable     Do you have a current opioid prescription? No  Do you use any other controlled substances or medications that are not prescribed by a provider? None      Current providers sharing in care for this patient include:   Patient Care Team:  Edie Esqueda MD as PCP - General (Family Medicine)  Edie Esqueda MD as Assigned PCP    The following health maintenance items are reviewed in Epic and correct as of today:  Health Maintenance   Topic Date Due     DEPRESSION ACTION PLAN  Never done     MEDICARE ANNUAL WELLNESS VISIT  12/09/2022     DEXA  04/27/2023     MAMMO SCREENING  05/04/2023     PHQ-9  09/21/2023     TSH W/FREE T4 REFLEX  02/08/2024     ANNUAL REVIEW OF HM ORDERS  03/21/2024     FALL RISK  ASSESSMENT  04/24/2024     COLORECTAL CANCER SCREENING  02/28/2025     LIPID  12/09/2026     ADVANCE CARE PLANNING  12/09/2026     DTAP/TDAP/TD IMMUNIZATION (2 - Td or Tdap) 12/10/2029     HEPATITIS C SCREENING  Completed     INFLUENZA VACCINE  Completed     Pneumococcal Vaccine: 65+ Years  Completed     COVID-19 Vaccine  Completed     IPV IMMUNIZATION  Aged Out     MENINGITIS IMMUNIZATION  Aged Out     ZOSTER IMMUNIZATION  Discontinued     Lab work is in process  Labs reviewed in EPIC  BP Readings from Last 3 Encounters:   04/24/23 128/72   10/17/22 132/70   09/07/22 136/84    Wt Readings from Last 3 Encounters:   04/24/23 72.7 kg (160 lb 4.8 oz)   10/17/22 71.1 kg (156 lb 11.2 oz)   09/07/22 69.7 kg (153 lb 11.2 oz)                  Patient Active Problem List   Diagnosis     Chronic Constipation     Insomnia     Osteopenia     Aortic valve stenosis, etiology of cardiac valve disease unspecified     Hypothyroidism     Lumbar spinal stenosis     Dysthymia     Past Surgical History:   Procedure Laterality Date     BACK SURGERY  2/16/18    spinal stenosis with lumbar claudication and synovial cyst removal. Dr. Lencho Lindsey at [facility]     CHOLECYSTECTOMY  1991    Description: Cholecystectomy;  Recorded: 03/01/2013;  Comments: 1991 by Dr. Tucker     HYSTERECTOMY  2000    Secondary to endometrial cancer.     LUMBAR FUSION  02/2018    L4 through S1     LUMBAR SPINE SURGERY  1985    Right sciatic, lumbar laminectomy.     MOLE REMOVAL  09/13/2011    seborrheic keratosis removed from her right anterior abdominal wall     OOPHORECTOMY Bilateral 2000     TOTAL HIP ARTHROPLASTY Left 01/05/2011    Dr. Albert at      TUBAL LIGATION  1980            Social History     Tobacco Use     Smoking status: Never     Smokeless tobacco: Never   Vaping Use     Vaping status: Never Used   Substance Use Topics     Alcohol use: No     Family History   Problem Relation Age of Onset     Cancer Brother         testicular, bladder,  kidney, skin     Diabetes Type 2  Brother      Prostate Cancer Brother      Heart Disease Brother 59.00        heart attack      Valvular heart disease Mother      Diabetes Type 2  Mother      Cerebrovascular Disease Father      Alcoholism Daughter      Diabetes Brother      Cancer Brother      Allergies Brother          Current Outpatient Medications   Medication Sig Dispense Refill     FLUoxetine (PROZAC) 10 MG tablet TAKE ONE TABLET BY MOUTH ONE TIME DAILY 90 tablet 0     levothyroxine (SYNTHROID/LEVOTHROID) 75 MCG tablet Take 1 tablet (75 mcg) by mouth daily 90 tablet 3     linaclotide (LINZESS) 72 MCG capsule Take 1 capsule by mouth every morning (before breakfast)       methylcellulose (CITRUCEL) powder Take 1 Tablespoonful by mouth daily       ASPIRIN NOT PRESCRIBED (INTENTIONAL) Please choose reason not prescribed from choices below.       STATIN NOT PRESCRIBED (INTENTIONAL) Please choose reason not prescribed from choices below.       Allergies   Allergen Reactions     Penicillins Hives     Age 14, hives in mouth     Recent Labs   Lab Test 02/08/23  1547 10/17/22  1622 09/07/22  0954 12/09/21  1108 04/06/21  1205 09/28/20  1611 03/17/20  1152 02/13/18  0934 03/28/17  0953   LDL  --   --   --  88 106  --   --   --  100   HDL  --   --   --  91 85  --   --   --  88   TRIG  --   --   --  81 66  --   --   --  63   ALT  --   --   --  16 20  --  21  --   --    CR  --   --   --  0.80 0.78  --  0.85   < >  --    GFRESTIMATED  --   --   --  73 >60  --  >60   < >  --    GFRESTBLACK  --   --   --   --  >60  --  >60   < >  --    POTASSIUM  --   --   --  4.4 4.6  --  4.3   < >  --    TSH 0.89 0.89   < > 7.65* 4.01   < >  --    < >  --     < > = values in this interval not displayed.        Pertinent mammograms are reviewed under the imaging tab.    Review of Systems   Constitutional: Negative for chills and fever.   HENT: Negative for congestion, ear pain, hearing loss and sore throat.    Eyes: Negative for pain and  "visual disturbance.   Respiratory: Negative for cough and shortness of breath.    Cardiovascular: Negative for chest pain, palpitations and peripheral edema.   Gastrointestinal: Positive for constipation and heartburn. Negative for abdominal pain, diarrhea, hematochezia and nausea.   Breasts:  Negative for tenderness, breast mass and discharge.   Genitourinary: Negative for dysuria, frequency, genital sores, hematuria, pelvic pain, urgency, vaginal bleeding and vaginal discharge.   Musculoskeletal: Positive for arthralgias. Negative for joint swelling and myalgias.   Skin: Negative for rash.   Neurological: Negative for dizziness, weakness, headaches and paresthesias.   Psychiatric/Behavioral: Negative for mood changes. The patient is not nervous/anxious.          OBJECTIVE:   /72   Pulse 74   Temp 97.8  F (36.6  C) (Oral)   Resp 16   Ht 1.715 m (5' 7.5\")   Wt 72.7 kg (160 lb 4.8 oz)   LMP  (LMP Unknown)   SpO2 97%   BMI 24.74 kg/m   Estimated body mass index is 24.74 kg/m  as calculated from the following:    Height as of this encounter: 1.715 m (5' 7.5\").    Weight as of this encounter: 72.7 kg (160 lb 4.8 oz).  Physical Exam  GENERAL APPEARANCE: healthy, alert and no distress  EYES: Eyes grossly normal to inspection, PERRL and conjunctivae and sclerae normal  HENT: ear canals and TM's normal, nose and mouth without ulcers or lesions, oropharynx clear and oral mucous membranes moist  NECK: no adenopathy, no asymmetry, masses, or scars and thyroid normal to palpation  RESP: lungs clear to auscultation - no rales, rhonchi or wheezes  CV: regular rate and rhythm, normal S1 S2, no S3 or S4, no murmur, click or rub, no peripheral edema and peripheral pulses strong  ABDOMEN: soft, nontender, no hepatosplenomegaly, no masses and bowel sounds normal  MS: no musculoskeletal defects are noted and gait is age appropriate without ataxia  SKIN: no suspicious lesions or rashes  NEURO: Normal strength and tone, " sensory exam grossly normal, mentation intact and speech normal  PSYCH: mentation appears normal and affect normal/bright  She declines breast exam        ASSESSMENT / PLAN:     Medicare annual wellness visit, subsequent  At today's visit, we discussed lifestyle interventions to promote self-management and wellness, including maintenance of a healthy weight, healthy diet, regular physical activity and exercise, and falls prevention.  We will obtain nonfasting lipids today.  Orders placed for screening mammogram and bone density scan.  Immunizations reviewed and up-to-date.  - Lipid panel reflex to direct LDL Non-fasting; Future  - Lipid panel reflex to direct LDL Non-fasting    Hypothyroidism, unspecified type  Clinically euthyroid, remains compliant with levothyroxine, normal TSH in February.    Osteopenia, unspecified location  Encouraged weightbearing exercises, measures to reduce risk of falls, and adequate calcium and vitamin D intake.  We will check vitamin D level.  Order placed for follow-up bone density scan.  - Vitamin D Deficiency; Future  - Vitamin D Deficiency    Aortic valve stenosis, etiology of cardiac valve disease unspecified  This remains asymptomatic.  We will plan to recheck echocardiogram in 1 to 2 years.    Constipation, unspecified constipation type  She will follow-up with gastroenterology.    Dysthymia  Stable and controlled on fluoxetine.    Primary insomnia  Stable and controlled on fluoxetine.    Spinal stenosis of lumbar region, unspecified whether neurogenic claudication present  Chronic and stable.    Dry eyes  She will continue to work with ophthalmology.    Visit for screening mammogram  - MA SCREENING DIGITAL BILAT - Future  (s+30); Future    Asymptomatic postmenopausal status  - DEXA HIP/PELVIS/SPINE - Future; Future     Patient has been advised of split billing requirements and indicates understanding: Yes      COUNSELING:  Reviewed preventive health counseling, as reflected in  patient instructions        She reports that she has never smoked. She has never used smokeless tobacco.      Appropriate preventive services were discussed with this patient, including applicable screening as appropriate for cardiovascular disease, diabetes, osteopenia/osteoporosis, and glaucoma.  As appropriate for age/gender, discussed screening for colorectal cancer, prostate cancer, breast cancer, and cervical cancer. Checklist reviewing preventive services available has been given to the patient.    Reviewed patients plan of care and provided an AVS. The Basic Care Plan (routine screening as documented in Health Maintenance) for Iesha meets the Care Plan requirement. This Care Plan has been established and reviewed with the Patient.          Edie Esqueda MD  [facility]    Identified Health Risks:    I have reviewed Opioid Use Disorder and Substance Use Disorder risk factors and made any needed referrals.       The patient was counseled and encouraged to consider modifying their diet and eating habits. She was provided with information on recommended healthy diet options.

## 2023-04-25 LAB — DEPRECATED CALCIDIOL+CALCIFEROL SERPL-MC: 39 UG/L (ref 20–75)

## 2023-05-22 DIAGNOSIS — F34.1 DYSTHYMIA: ICD-10-CM

## 2023-05-23 ENCOUNTER — TELEPHONE (OUTPATIENT)
Dept: FAMILY MEDICINE | Facility: CLINIC | Age: 78
End: 2023-05-23
Payer: COMMERCIAL

## 2023-05-23 ENCOUNTER — PATIENT OUTREACH (OUTPATIENT)
Dept: CARE COORDINATION | Facility: CLINIC | Age: 78
End: 2023-05-23
Payer: COMMERCIAL

## 2023-05-23 RX ORDER — FLUOXETINE 10 MG/1
TABLET, FILM COATED ORAL
Qty: 90 TABLET | Refills: 2 | Status: SHIPPED | OUTPATIENT
Start: 2023-05-23 | End: 2024-05-01

## 2023-05-23 NOTE — TELEPHONE ENCOUNTER
Reason for call:  Other     Patient called regarding (reason for call): prescription    Additional comments: Patient is calling and stating that she needs a prior authorization for this medication Fluoxetine every year. Please advise and call patient if needed please and thank you.    Phone number to reach patient:  Home number on file [phone] (home)    Best Time:  any    Can we leave a detailed message on this number?  YES

## 2023-05-23 NOTE — TELEPHONE ENCOUNTER
Prescription approved per [facility] Refill Protocol.    HATTIE WickN, RN  [facility]

## 2023-05-30 NOTE — TELEPHONE ENCOUNTER
PA Initiation    Medication: FLUOXETINE HCL 10 MG PO TABS  Insurance Company: [employer] - Phone [phone] Fax [phone]  Pharmacy Filling the Rx: [id number] [address]  Filling Pharmacy Phone: [phone]  Filling Pharmacy Fax:    Start Date: 5/30/2023

## 2023-05-30 NOTE — TELEPHONE ENCOUNTER
Prior Authorization Approval    Medication: FLUOXETINE HCL 10 MG PO TABS  Authorization Effective Date: 4/30/2023  Authorization Expiration Date: 5/29/2024  Approved Dose/Quantity: 90/90  Reference #: [id number]   Insurance Company: [employer] - Phone [phone] Fax [phone]  Expected CoPay:       CoPay Card Available:        Which Pharmacy is filling the prescription: [id number] [place], [place] - [address]  Pharmacy Notified: Yes  Patient Notified: Yes

## 2023-06-21 ENCOUNTER — ANCILLARY PROCEDURE (OUTPATIENT)
Dept: BONE DENSITY | Facility: CLINIC | Age: 78
End: 2023-06-21
Attending: FAMILY MEDICINE
Payer: COMMERCIAL

## 2023-06-21 ENCOUNTER — HOSPITAL ENCOUNTER (OUTPATIENT)
Dept: MAMMOGRAPHY | Facility: CLINIC | Age: 78
Discharge: HOME | End: 2023-06-21
Attending: FAMILY MEDICINE | Admitting: FAMILY MEDICINE
Payer: COMMERCIAL

## 2023-06-21 DIAGNOSIS — Z78.0 ASYMPTOMATIC POSTMENOPAUSAL STATUS: ICD-10-CM

## 2023-06-21 DIAGNOSIS — Z12.31 VISIT FOR SCREENING MAMMOGRAM: ICD-10-CM

## 2023-06-21 PROCEDURE — 77080 DXA BONE DENSITY AXIAL: CPT | Performed by: PHYSICIAN ASSISTANT

## 2023-06-21 PROCEDURE — 77067 SCR MAMMO BI INCL CAD: CPT

## 2023-06-23 NOTE — RESULT ENCOUNTER NOTE
Your recent bone density scan indicates osteopenia, or low bone density.  It does not show osteoporosis, however your risk of fracture is high.  I recommend that you schedule a visit, either virtual or in person, to review these results, to discuss further evaluation for causes of low bone density, and to discuss treatment options.  Please note that this is not an urgent visit, I recommend you follow-up within the 3-months

## 2023-06-26 NOTE — RESULT ENCOUNTER NOTE
Results notes read by patient on 6/24/23 at 8:31 AM. No need for patient outreach at this time.    HATTIE WickN, RN  [facility]

## 2023-08-16 ENCOUNTER — TRANSFERRED RECORDS (OUTPATIENT)
Dept: HEALTH INFORMATION MANAGEMENT | Facility: CLINIC | Age: 78
End: 2023-08-16
Payer: COMMERCIAL

## 2023-08-29 DIAGNOSIS — E03.9 HYPOTHYROIDISM, UNSPECIFIED TYPE: ICD-10-CM

## 2023-08-29 RX ORDER — LEVOTHYROXINE SODIUM 75 UG/1
75 TABLET ORAL DAILY
Qty: 90 TABLET | Refills: 2 | Status: SHIPPED | OUTPATIENT
Start: 2023-08-29 | End: 2024-05-01

## 2023-08-30 NOTE — TELEPHONE ENCOUNTER
"Last Written Prescription Date:  9/10/22  Last Fill Quantity: 90,  # refills: 3   Last office visit provider:  4/24/23     Requested Prescriptions   Pending Prescriptions Disp Refills    levothyroxine (SYNTHROID/LEVOTHROID) 75 MCG tablet [Pharmacy Med Name: Levothyroxine Sodium Oral Tablet 75 MCG] 90 tablet 0     Sig: TAKE ONE TABLET BY MOUTH ONE TIME DAILY       Thyroid Protocol Passed - 8/29/2023  9:41 AM        Passed - Patient is 12 years or older        Passed - Recent (12 mo) or future (30 days) visit within the authorizing provider's specialty     Patient has had an office visit with the authorizing provider or a provider within the authorizing providers department within the previous 12 mos or has a future within next 30 days. See \"Patient Info\" tab in inbasket, or \"Choose Columns\" in Meds & Orders section of the refill encounter.              Passed - Medication is active on med list        Passed - Normal TSH on file in past 12 months     Recent Labs   Lab Test 02/08/23  1547   TSH 0.89              Passed - No active pregnancy on record     If patient is pregnant or has had a positive pregnancy test, please check TSH.          Passed - No positive pregnancy test in past 12 months     If patient is pregnant or has had a positive pregnancy test, please check TSH.               Pema Kay RN 08/29/23 11:15 PM  "

## 2023-09-19 ENCOUNTER — TRANSFERRED RECORDS (OUTPATIENT)
Dept: HEALTH INFORMATION MANAGEMENT | Facility: CLINIC | Age: 78
End: 2023-09-19
Payer: COMMERCIAL

## 2023-10-16 DIAGNOSIS — K59.00 CONSTIPATION, UNSPECIFIED CONSTIPATION TYPE: Primary | ICD-10-CM

## 2023-12-01 ENCOUNTER — TRANSFERRED RECORDS (OUTPATIENT)
Dept: HEALTH INFORMATION MANAGEMENT | Facility: CLINIC | Age: 78
End: 2023-12-01
Payer: COMMERCIAL

## 2023-12-05 ENCOUNTER — TRANSFERRED RECORDS (OUTPATIENT)
Dept: HEALTH INFORMATION MANAGEMENT | Facility: CLINIC | Age: 78
End: 2023-12-05

## 2024-01-12 ENCOUNTER — IMMUNIZATION (OUTPATIENT)
Dept: NURSING | Facility: CLINIC | Age: 79
End: 2024-01-12
Payer: COMMERCIAL

## 2024-01-12 PROCEDURE — 91320 SARSCV2 VAC 30MCG TRS-SUC IM: CPT

## 2024-01-12 PROCEDURE — 90480 ADMN SARSCOV2 VAC 1/ONLY CMP: CPT

## 2024-04-25 ENCOUNTER — TRANSFERRED RECORDS (OUTPATIENT)
Dept: HEALTH INFORMATION MANAGEMENT | Facility: CLINIC | Age: 79
End: 2024-04-25
Payer: COMMERCIAL

## 2024-04-28 PROBLEM — K59.09 CHRONIC CONSTIPATION WITH OVERFLOW: Status: ACTIVE | Noted: 2024-04-28

## 2024-05-01 ENCOUNTER — OFFICE VISIT (OUTPATIENT)
Dept: FAMILY MEDICINE | Facility: CLINIC | Age: 79
End: 2024-05-01
Attending: FAMILY MEDICINE
Payer: COMMERCIAL

## 2024-05-01 VITALS
SYSTOLIC BLOOD PRESSURE: 132 MMHG | TEMPERATURE: 98.3 F | BODY MASS INDEX: 23.79 KG/M2 | WEIGHT: 157 LBS | RESPIRATION RATE: 18 BRPM | HEIGHT: 68 IN | OXYGEN SATURATION: 98 % | HEART RATE: 70 BPM | DIASTOLIC BLOOD PRESSURE: 80 MMHG

## 2024-05-01 DIAGNOSIS — Z85.89 HISTORY OF SQUAMOUS CELL CARCINOMA: ICD-10-CM

## 2024-05-01 DIAGNOSIS — K59.09 CHRONIC CONSTIPATION WITH OVERFLOW: ICD-10-CM

## 2024-05-01 DIAGNOSIS — I35.0 AORTIC VALVE STENOSIS, ETIOLOGY OF CARDIAC VALVE DISEASE UNSPECIFIED: ICD-10-CM

## 2024-05-01 DIAGNOSIS — M48.061 SPINAL STENOSIS OF LUMBAR REGION, UNSPECIFIED WHETHER NEUROGENIC CLAUDICATION PRESENT: ICD-10-CM

## 2024-05-01 DIAGNOSIS — Z00.00 MEDICARE ANNUAL WELLNESS VISIT, SUBSEQUENT: Primary | ICD-10-CM

## 2024-05-01 DIAGNOSIS — F34.1 DYSTHYMIA: ICD-10-CM

## 2024-05-01 DIAGNOSIS — Z13.220 LIPID SCREENING: ICD-10-CM

## 2024-05-01 DIAGNOSIS — E03.9 HYPOTHYROIDISM, UNSPECIFIED TYPE: ICD-10-CM

## 2024-05-01 DIAGNOSIS — M85.80 OSTEOPENIA, UNSPECIFIED LOCATION: ICD-10-CM

## 2024-05-01 LAB — HOLD SPECIMEN: NORMAL

## 2024-05-01 PROCEDURE — 80061 LIPID PANEL: CPT | Performed by: FAMILY MEDICINE

## 2024-05-01 PROCEDURE — 84443 ASSAY THYROID STIM HORMONE: CPT | Performed by: FAMILY MEDICINE

## 2024-05-01 PROCEDURE — 83970 ASSAY OF PARATHORMONE: CPT | Performed by: FAMILY MEDICINE

## 2024-05-01 PROCEDURE — 99214 OFFICE O/P EST MOD 30 MIN: CPT | Mod: 25 | Performed by: FAMILY MEDICINE

## 2024-05-01 PROCEDURE — 80069 RENAL FUNCTION PANEL: CPT | Performed by: FAMILY MEDICINE

## 2024-05-01 PROCEDURE — G0439 PPPS, SUBSEQ VISIT: HCPCS | Performed by: FAMILY MEDICINE

## 2024-05-01 PROCEDURE — 82306 VITAMIN D 25 HYDROXY: CPT | Performed by: FAMILY MEDICINE

## 2024-05-01 PROCEDURE — 36415 COLL VENOUS BLD VENIPUNCTURE: CPT | Performed by: FAMILY MEDICINE

## 2024-05-01 RX ORDER — FLUOROMETHOLONE 0.1 %
2 SUSPENSION, DROPS(FINAL DOSAGE FORM)(ML) OPHTHALMIC (EYE) DAILY
COMMUNITY
Start: 2023-08-08

## 2024-05-01 RX ORDER — LEVOTHYROXINE SODIUM 75 UG/1
75 TABLET ORAL DAILY
Qty: 90 TABLET | Refills: 4 | Status: SHIPPED | OUTPATIENT
Start: 2024-05-01

## 2024-05-01 RX ORDER — FLUOXETINE 10 MG/1
10 TABLET, FILM COATED ORAL DAILY
Qty: 90 TABLET | Refills: 4 | Status: SHIPPED | OUTPATIENT
Start: 2024-05-01 | End: 2024-07-30

## 2024-05-01 SDOH — HEALTH STABILITY: PHYSICAL HEALTH: ON AVERAGE, HOW MANY DAYS PER WEEK DO YOU ENGAGE IN MODERATE TO STRENUOUS EXERCISE (LIKE A BRISK WALK)?: 7 DAYS

## 2024-05-01 ASSESSMENT — PATIENT HEALTH QUESTIONNAIRE - PHQ9
SUM OF ALL RESPONSES TO PHQ QUESTIONS 1-9: 1
SUM OF ALL RESPONSES TO PHQ QUESTIONS 1-9: 1
10. IF YOU CHECKED OFF ANY PROBLEMS, HOW DIFFICULT HAVE THESE PROBLEMS MADE IT FOR YOU TO DO YOUR WORK, TAKE CARE OF THINGS AT HOME, OR GET ALONG WITH OTHER PEOPLE: NOT DIFFICULT AT ALL

## 2024-05-01 ASSESSMENT — SOCIAL DETERMINANTS OF HEALTH (SDOH): HOW OFTEN DO YOU GET TOGETHER WITH FRIENDS OR RELATIVES?: THREE TIMES A WEEK

## 2024-05-01 ASSESSMENT — PAIN SCALES - GENERAL: PAINLEVEL: NO PAIN (0)

## 2024-05-01 NOTE — PATIENT INSTRUCTIONS
Continue to go for a walk daily to help with your bone density.  We will check to ensure your vitamin D level is normal.  You could consider taking alendronate once weekly to help support your bone density, let me know if you like to pursue this.    To help with your spine pain, you could consider transitioning from fluoxetine to duloxetine which is a bit better effectiveness in managing chronic pain.    I placed an order for your follow-up heart echo, the team will call you to schedule.  Preventive Care Advice   This is general advice given by our system to help you stay healthy. However, your care team may have specific advice just for you. Please talk to your care team about your preventive care needs.  Nutrition  Eat 5 or more servings of fruits and vegetables each day.  Try wheat bread, brown rice and whole grain pasta (instead of white bread, rice, and pasta).  Get enough calcium and vitamin D. Check the label on foods and aim for 100% of the RDA (recommended daily allowance).  Lifestyle  Exercise at least 150 minutes each week   (30 minutes a day, 5 days a week).  Do muscle strengthening activities 2 days a week. These help control your weight and prevent disease.  No smoking.  Wear sunscreen to prevent skin cancer.  Have a dental exam and cleaning every 6 months.  Yearly exams  See your health care team every year to talk about:  Any changes in your health.  Any medicines your care team has prescribed.  Preventive care, family planning, and ways to prevent chronic diseases.  Shots (vaccines)   HPV shots (up to age 26), if you've never had them before.  Hepatitis B shots (up to age 59), if you've never had them before.  COVID-19 shot: Get this shot when it's due.  Flu shot: Get a flu shot every year.  Tetanus shot: Get a tetanus shot every 10 years.  Pneumococcal, hepatitis A, and RSV shots: Ask your care team if you need these based on your risk.  Shingles shot (for age 50 and up).  General health  tests  Diabetes screening:  Starting at age 35, Get screened for diabetes at least every 3 years.  If you are younger than age 35, ask your care team if you should be screened for diabetes.  Cholesterol test: At age 39, start having a cholesterol test every 5 years, or more often if advised.  Bone density scan (DEXA): At age 50, ask your care team if you should have this scan for osteoporosis (brittle bones).  Hepatitis C: Get tested at least once in your life.  STIs (sexually transmitted infections)  Before age 24: Ask your care team if you should be screened for STIs.  After age 24: Get screened for STIs if you're at risk. You are at risk for STIs (including HIV) if:  You are sexually active with more than one person.  You don't use condoms every time.  You or a partner was diagnosed with a sexually transmitted infection.  If you are at risk for HIV, ask about PrEP medicine to prevent HIV.  Get tested for HIV at least once in your life, whether you are at risk for HIV or not.  Cancer screening tests  Cervical cancer screening: If you have a cervix, begin getting regular cervical cancer screening tests at age 21. Most people who have regular screenings with normal results can stop after age 65. Talk about this with your provider.  Breast cancer scan (mammogram): If you've ever had breasts, begin having regular mammograms starting at age 40. This is a scan to check for breast cancer.  Colon cancer screening: It is important to start screening for colon cancer at age 45.  Have a colonoscopy test every 10 years (or more often if you're at risk) Or, ask your provider about stool tests like a FIT test every year or Cologuard test every 3 years.  To learn more about your testing options, visit: https://www.[email or web address]/204278.pdf.  For help making a decision, visit: https://bit.ly/wg40797.  Prostate cancer screening test: If you have a prostate and are age 55 to 69, ask your provider if you would benefit from a yearly  prostate cancer screening test.  Lung cancer screening: If you are a current or former smoker age 50 to 80, ask your care team if ongoing lung cancer screenings are right for you.  For informational purposes only. Not to replace the advice of your health care provider. Copyright   2023 [facility] [employer]. All rights reserved. Clinically reviewed by the [facility] Transitions Program. [employer] [id number] - REV 01/24.

## 2024-05-01 NOTE — PROGRESS NOTES
Preventive Care Visit  [facility]  Edie Esqueda MD, Family Medicine  May 1, 2024      Assessment & Plan     Medicare annual wellness visit, subsequent  At today's visit, we discussed lifestyle interventions to promote self-management and wellness, including maintenance of a healthy weight, healthy diet, regular physical activity and exercise, and falls prevention.  Discussed RSV vaccine, she will consider.  Will screen for dyslipidemia and diabetes.  She is up-to-date with mammogram screening, bone density screening.    Lipid screening  - Lipid panel reflex to direct LDL Fasting; Future    Dysthymia  Stable and controlled on fluoxetine, feels better on medicine then with attempt to wean.  Continue indefinitely.  - FLUoxetine (PROZAC) 10 MG tablet; Take 1 tablet (10 mg) by mouth daily    Hypothyroidism, unspecified type  Clinically euthyroid.  Continue levothyroxine.  Will check TSH today.  - TSH WITH FREE T4 REFLEX; Future  - levothyroxine (SYNTHROID/LEVOTHROID) 75 MCG tablet; Take 1 tablet (75 mcg) by mouth daily    Chronic constipation with overflow  She remains on Linzess under the direction of Dr. Mtz of GI.  Continue.    Aortic valve stenosis, etiology of cardiac valve disease unspecified  This remains asymptomatic.  Will reassess with echocardiogram.  - Echocardiogram Complete; Future    Osteopenia, unspecified location  Encourage weightbearing activities, measures to reduce risk of falls, and adequate calcium and vitamin D intake.  Because it hip fracture risk is elevated at 3.5%, offered and encouraged consideration of initiation of alendronate, she declines primarily due to concerns about potential GI effects as she already has some GI symptoms.  She will keep this in mind but declines treatment.  We will check for underlying contributing factors.  - Vitamin D Deficiency; Future  - Parathyroid Hormone Intact; Future  - Renal panel; Future    History of squamous cell  carcinoma  Followed by dermatology status post resection    Spinal stenosis of lumbar region, unspecified whether neurogenic claudication present  Followed by orthopedics.  Could give consideration to gabapentin or duloxetine to help with chronic pain.              Counseling  Appropriate preventive services were discussed with this patient, including applicable screening as appropriate for fall prevention, nutrition, physical activity, Tobacco-use cessation, weight loss and cognition.  Checklist reviewing preventive services available has been given to the patient.  Reviewed patient's diet, addressing concerns and/or questions.           Ana Julian is a 78 year old, presenting for the following:  Wellness Visit (fasting) and Trigger finger? (Right 3rd finger- painful)          Health Care Directive  Patient has a Health Care Directive on file  Discussed advance care planning with patient.    Seen in clinic today for routine preventive care visit.  She had a squamous cell carcinoma removed from her right fourth finger last week, doing well from that.  She tried to taper off her fluoxetine earlier this year, noted that her sleep got worse and so she has resumed at 10 mg daily, feeling like herself.  Due for follow-up of thyroid, remains compliant with thyroxine.  History of significant constipation complicated by anal stenosis, taking Linzess, followed by Dr. Mtz.  Known aortic valve stenosis, this remains asymptomatic without lightheadedness, dizziness, shortness of breath, palpitations, chest pain, exertional dyspnea, edema.  Last echo 2021.  Working with [facility] orthopedics regarding his pain from the lumbar and cervical spine.  Will be seeing them regarding trigger finger symptoms in her right third finger.  Known osteopenia, now with elevated fracture risk of hip at 3.5%.  Resizing regularly by walking.  Overall healthy diet.  She fell once just over a year ago.            5/1/2024   General Health    How would you rate your overall physical health? Good   Feel stress (tense, anxious, or unable to sleep) Not at all         5/1/2024   Nutrition   Diet: Regular (no restrictions)         5/1/2024   Exercise   Days per week of moderate/strenous exercise 7 days         5/1/2024   Social Factors   Frequency of gathering with friends or relatives Three times a week   Worry food won't last until get money to buy more No   Food not last or not have enough money for food? No   Do you have housing?  Yes   Are you worried about losing your housing? No   Lack of transportation? No   Unable to get utilities (heat,electricity)? No         5/1/2024   Fall Risk   Fallen 2 or more times in the past year? No   Trouble with walking or balance? No          5/1/2024   Activities of Daily Living- Home Safety   Needs help with the following daily activites None of the above   Safety concerns in the home None of the above         5/1/2024   Dental   Dentist two times every year? Yes         5/1/2024   Hearing Screening   Hearing concerns? None of the above         5/1/2024   Driving Risk Screening   Patient/family members have concerns about driving No         5/1/2024   General Alertness/Fatigue Screening   Have you been more tired than usual lately? No         5/1/2024   Urinary Incontinence Screening   Bothered by leaking urine in past 6 months No         5/1/2024   TB Screening   Were you born outside of the [place]? No       Today's PHQ-9 Score:       5/1/2024     7:24 AM   PHQ-9 SCORE   PHQ-9 Total Score MyChart 1 (Minimal depression)   PHQ-9 Total Score 1         5/1/2024   Substance Use   Alcohol more than 3/day or more than 7/wk No   Do you have a current opioid prescription? No   How severe/bad is pain from 1 to 10? 0/10 (No Pain)   Do you use any other substances recreationally? No     Social History     Tobacco Use    Smoking status: Never    Smokeless tobacco: Never   Vaping Use    Vaping status: Never Used   Substance Use  Topics    Alcohol use: No    Drug use: Not Currently           2023   LAST FHS-7 RESULTS   1st degree relative breast or ovarian cancer No   Any relative bilateral breast cancer No   Any male have breast cancer No   Any ONE woman have BOTH breast AND ovarian cancer No   Any woman with breast cancer before 50yrs No   2 or more relatives with breast AND/OR ovarian cancer No   2 or more relatives with breast AND/OR bowel cancer No        Mammogram Screening - After age 74- determine frequency with patient based on health status, life expectancy and patient goals    ASCVD Risk   The 10-year ASCVD risk score (Paulette DK, et al., 2019) is: 19.3%    Values used to calculate the score:      Age: 78 years      Sex: Female      Is Non- : No      Diabetic: No      Tobacco smoker: No      Systolic Blood Pressure: 132 mmHg      Is BP treated: No      HDL Cholesterol: 97 mg/dL      Total Cholesterol: 191 mg/dL            Reviewed and updated as needed this visit by Provider                    Past Medical History:   Diagnosis Date    Atrial fibrillation (H)     single episode never repeated, two separate monitor instances    Endometrial cancer (H) 2000    Insomnia      Past Surgical History:   Procedure Laterality Date    BACK SURGERY  18    spinal stenosis with lumbar claudication and synovial cyst removal. Dr. Lencho Lindsey at [facility]    CHOLECYSTECTOMY      Description: Cholecystectomy;  Recorded: 2013;  Comments:  by Dr. Tucker    HYSTERECTOMY      Secondary to endometrial cancer.    LUMBAR FUSION  2018    L4 through S1    LUMBAR SPINE SURGERY  1985    Right sciatic, lumbar laminectomy.    MOLE REMOVAL  2011    seborrheic keratosis removed from her right anterior abdominal wall    OOPHORECTOMY Bilateral     TOTAL HIP ARTHROPLASTY Left 2011    Dr. Albert at     TUBAL LIGATION            OB History    Para Term  AB Living    3 3 3 0 0 0   SAB IAB Ectopic Multiple Live Births   0 0 0 0 0      # Outcome Date GA Lbr Christofer/2nd Weight Sex Type Anes PTL Lv   3 Term            2 Term            1 Term              Lab work is in process  Labs reviewed in EPIC  BP Readings from Last 3 Encounters:   05/01/24 132/80   04/24/23 128/72   10/17/22 132/70    Wt Readings from Last 3 Encounters:   05/01/24 71.2 kg (157 lb)   04/24/23 72.7 kg (160 lb 4.8 oz)   10/17/22 71.1 kg (156 lb 11.2 oz)                  Patient Active Problem List   Diagnosis    Chronic Constipation    Insomnia    Osteopenia    Aortic valve stenosis, etiology of cardiac valve disease unspecified    Hypothyroidism    Lumbar spinal stenosis    Dysthymia    Chronic constipation with overflow     Past Surgical History:   Procedure Laterality Date    BACK SURGERY  2/16/18    spinal stenosis with lumbar claudication and synovial cyst removal. Dr. Lencho Lindsey at [facility]    CHOLECYSTECTOMY  1991    Description: Cholecystectomy;  Recorded: 03/01/2013;  Comments: 1991 by Dr. Tucker    HYSTERECTOMY  2000    Secondary to endometrial cancer.    LUMBAR FUSION  02/2018    L4 through S1    LUMBAR SPINE SURGERY  1985    Right sciatic, lumbar laminectomy.    MOLE REMOVAL  09/13/2011    seborrheic keratosis removed from her right anterior abdominal wall    OOPHORECTOMY Bilateral 2000    TOTAL HIP ARTHROPLASTY Left 01/05/2011    Dr. Albert at     TUBAL LIGATION  1980            Social History     Tobacco Use    Smoking status: Never    Smokeless tobacco: Never   Substance Use Topics    Alcohol use: No     Family History   Problem Relation Age of Onset    Cancer Brother         testicular, bladder, kidney, skin    Diabetes Type 2  Brother     Prostate Cancer Brother     Heart Disease Brother 59.00        heart attack     Valvular heart disease Mother     Diabetes Type 2  Mother     Cerebrovascular Disease Father     Alcoholism Daughter     Diabetes Brother     Cancer Brother     Allergies  Brother          Current Outpatient Medications   Medication Sig Dispense Refill    fluorometholone (FML LIQUIFILM) 0.1 % ophthalmic suspension Place 2 drops into both eyes daily      FLUoxetine (PROZAC) 10 MG tablet Take 1 tablet (10 mg) by mouth daily 90 tablet 4    levothyroxine (SYNTHROID/LEVOTHROID) 75 MCG tablet Take 1 tablet (75 mcg) by mouth daily 90 tablet 4    linaclotide (LINZESS) 72 MCG capsule Take 1 capsule by mouth every morning (before breakfast)      methylcellulose (CITRUCEL) powder Take 1 Tablespoonful by mouth daily       Allergies   Allergen Reactions    Penicillins Hives     Age 14, hives in mouth     Recent Labs   Lab Test 04/24/23  1649 02/08/23  1547 10/17/22  1622 09/07/22  0954 12/09/21  1108 04/06/21  1205 09/28/20  1611 03/17/20  1152   LDL 81  --   --   --  88 106  --   --    HDL 97  --   --   --  91 85  --   --    TRIG 63  --   --   --  81 66  --   --    ALT  --   --   --   --  16 20  --  21   CR  --   --   --   --  0.80 0.78  --  0.85   GFRESTIMATED  --   --   --   --  73 >60  --  >60   GFRESTBLACK  --   --   --   --   --  >60  --  >60   POTASSIUM  --   --   --   --  4.4 4.6  --  4.3   TSH  --  0.89 0.89   < > 7.65* 4.01   < >  --     < > = values in this interval not displayed.      Current providers sharing in care for this patient include:  Patient Care Team:  Edie Esqueda MD as PCP - General (Family Medicine)  Edie Esqueda MD as Assigned PCP    The following health maintenance items are reviewed in Epic and correct as of today:  Health Maintenance   Topic Date Due    DEPRESSION ACTION PLAN  Never done    RSV VACCINE (Pregnancy & 60+) (1 - 1-dose 60+ series) Never done    TSH W/FREE T4 REFLEX  02/08/2024    ANNUAL REVIEW OF HM ORDERS  03/21/2024    MEDICARE ANNUAL WELLNESS VISIT  04/24/2024    COVID-19 Vaccine (6 - 2023-24 season) 05/12/2024    INFLUENZA VACCINE (Season Ended) 09/01/2024    PHQ-9  11/01/2024    GLUCOSE  12/09/2024    FALL RISK ASSESSMENT  05/01/2025     "DEXA  06/21/2025    LIPID  04/24/2028    ADVANCE CARE PLANNING  04/24/2028    DTAP/TDAP/TD IMMUNIZATION (2 - Td or Tdap) 12/10/2029    HEPATITIS C SCREENING  Completed    Pneumococcal Vaccine: 65+ Years  Completed    IPV IMMUNIZATION  Aged Out    HPV IMMUNIZATION  Aged Out    MENINGITIS IMMUNIZATION  Aged Out    RSV MONOCLONAL ANTIBODY  Aged Out    MAMMO SCREENING  Discontinued    COLORECTAL CANCER SCREENING  Discontinued    ZOSTER IMMUNIZATION  Discontinued         Review of Systems  Constitutional, neuro, ENT, endocrine, pulmonary, cardiac, gastrointestinal, genitourinary, musculoskeletal, integument and psychiatric systems are negative, except as otherwise noted.     Objective    Exam  /80   Pulse 70   Temp 98.3  F (36.8  C) (Temporal)   Resp 18   Ht 1.721 m (5' 7.75\")   Wt 71.2 kg (157 lb)   LMP  (LMP Unknown)   SpO2 98%   BMI 24.05 kg/m     Estimated body mass index is 24.05 kg/m  as calculated from the following:    Height as of this encounter: 1.721 m (5' 7.75\").    Weight as of this encounter: 71.2 kg (157 lb).    Physical Exam  Physical Examination: General appearance - alert, well appearing, and in no distress, oriented to person, place, and time and normal appearing weight  Mental status - alert, oriented to person, place, and time, normal mood, behavior, speech, dress, motor activity, and thought processes  Eyes - pupils equal and reactive, extraocular eye movements intact  Ears - bilateral TM's and external ear canals normal  Nose - normal and patent, no erythema, discharge or polyps  Mouth - mucous membranes moist, pharynx normal without lesions  Neck - supple, no significant adenopathy  Lymphatics - no palpable lymphadenopathy, no hepatosplenomegaly  Chest - clear to auscultation, no wheezes, rales or rhonchi, symmetric air entry  Heart - normal rate, regular rhythm, normal S1, S2, no murmurs, rubs, clicks or gallops  Abdomen - soft, nontender, nondistended, no masses or " organomegaly  Breasts -declines  Neurological - alert, oriented, normal speech, no focal findings or movement disorder noted  Musculoskeletal - no joint tenderness, deformity or swelling  Extremities - peripheral pulses normal, no pedal edema, no clubbing or cyanosis  Skin - normal coloration and turgor, no rashes, no suspicious skin lesions noted; bandage right fourth finger is not removed today        5/1/2024   Mini Cog   Clock Draw Score 2 Normal   3 Item Recall 3 objects recalled   Mini Cog Total Score 5              Signed Electronically by: Edie Esqueda MD    Answers submitted by the patient for this visit:  Patient Health Questionnaire (Submitted on 5/1/2024)  If you checked off any problems, how difficult have these problems made it for you to do your work, take care of things at home, or get along with other people?: Not difficult at all  PHQ9 TOTAL SCORE: 1

## 2024-05-02 LAB
ALBUMIN SERPL BCG-MCNC: 4.4 G/DL (ref 3.5–5.2)
ANION GAP SERPL CALCULATED.3IONS-SCNC: 11 MMOL/L (ref 7–15)
BUN SERPL-MCNC: 12.5 MG/DL (ref 8–23)
CALCIUM SERPL-MCNC: 8.8 MG/DL (ref 8.8–10.2)
CHLORIDE SERPL-SCNC: 99 MMOL/L (ref 98–107)
CHOLEST SERPL-MCNC: 179 MG/DL
CREAT SERPL-MCNC: 0.83 MG/DL (ref 0.51–0.95)
DEPRECATED HCO3 PLAS-SCNC: 26 MMOL/L (ref 22–29)
EGFRCR SERPLBLD CKD-EPI 2021: 73 ML/MIN/1.73M2
FASTING STATUS PATIENT QL REPORTED: YES
GLUCOSE SERPL-MCNC: 94 MG/DL (ref 70–99)
HDLC SERPL-MCNC: 91 MG/DL
LDLC SERPL CALC-MCNC: 75 MG/DL
NONHDLC SERPL-MCNC: 88 MG/DL
PHOSPHATE SERPL-MCNC: 4.2 MG/DL (ref 2.5–4.5)
POTASSIUM SERPL-SCNC: 4.4 MMOL/L (ref 3.4–5.3)
PTH-INTACT SERPL-MCNC: 39 PG/ML (ref 15–65)
SODIUM SERPL-SCNC: 136 MMOL/L (ref 135–145)
TRIGL SERPL-MCNC: 65 MG/DL
TSH SERPL DL<=0.005 MIU/L-ACNC: 2.18 UIU/ML (ref 0.3–4.2)
VIT D+METAB SERPL-MCNC: 35 NG/ML (ref 20–50)

## 2024-05-05 ENCOUNTER — HEALTH MAINTENANCE LETTER (OUTPATIENT)
Age: 79
End: 2024-05-05

## 2024-05-09 ENCOUNTER — TRANSFERRED RECORDS (OUTPATIENT)
Dept: HEALTH INFORMATION MANAGEMENT | Facility: CLINIC | Age: 79
End: 2024-05-09
Payer: COMMERCIAL

## 2024-06-11 ENCOUNTER — HOSPITAL ENCOUNTER (OUTPATIENT)
Dept: CARDIOLOGY | Facility: CLINIC | Age: 79
Discharge: HOME | End: 2024-06-11
Attending: FAMILY MEDICINE | Admitting: FAMILY MEDICINE
Payer: COMMERCIAL

## 2024-06-11 DIAGNOSIS — I35.0 AORTIC VALVE STENOSIS, ETIOLOGY OF CARDIAC VALVE DISEASE UNSPECIFIED: ICD-10-CM

## 2024-06-11 LAB — LVEF ECHO: NORMAL

## 2024-06-11 PROCEDURE — 93306 TTE W/DOPPLER COMPLETE: CPT

## 2024-06-11 PROCEDURE — 93306 TTE W/DOPPLER COMPLETE: CPT | Mod: 26 | Performed by: INTERNAL MEDICINE

## 2024-06-14 NOTE — RESULT ENCOUNTER NOTE
Your echo shows continued leakage of your aortic valve, the last heart valve as the problem with your heart.  It is progressed from mild to moderate.  We should follow this with another echo in 1 to 2 years.  If you develop lightheadedness, chest pain, shortness of breath, or palpitations, we should consider assessing it again sooner.  We do not need to take any additional measures at this time.

## 2024-06-25 ENCOUNTER — TRANSFERRED RECORDS (OUTPATIENT)
Dept: HEALTH INFORMATION MANAGEMENT | Facility: CLINIC | Age: 79
End: 2024-06-25
Payer: COMMERCIAL

## 2024-07-09 ENCOUNTER — TRANSFERRED RECORDS (OUTPATIENT)
Dept: HEALTH INFORMATION MANAGEMENT | Facility: CLINIC | Age: 79
End: 2024-07-09

## 2024-07-09 ENCOUNTER — TELEPHONE (OUTPATIENT)
Dept: FAMILY MEDICINE | Facility: CLINIC | Age: 79
End: 2024-07-09

## 2024-07-09 ENCOUNTER — OFFICE VISIT (OUTPATIENT)
Dept: FAMILY MEDICINE | Facility: CLINIC | Age: 79
End: 2024-07-09
Payer: COMMERCIAL

## 2024-07-09 VITALS
TEMPERATURE: 98.2 F | DIASTOLIC BLOOD PRESSURE: 72 MMHG | HEIGHT: 68 IN | OXYGEN SATURATION: 95 % | BODY MASS INDEX: 23.79 KG/M2 | HEART RATE: 73 BPM | RESPIRATION RATE: 18 BRPM | WEIGHT: 157 LBS | SYSTOLIC BLOOD PRESSURE: 122 MMHG

## 2024-07-09 DIAGNOSIS — M48.061 SPINAL STENOSIS OF LUMBAR REGION, UNSPECIFIED WHETHER NEUROGENIC CLAUDICATION PRESENT: ICD-10-CM

## 2024-07-09 DIAGNOSIS — M25.571 PAIN IN JOINT, ANKLE AND FOOT, RIGHT: Primary | ICD-10-CM

## 2024-07-09 PROCEDURE — 99214 OFFICE O/P EST MOD 30 MIN: CPT | Performed by: FAMILY MEDICINE

## 2024-07-09 RX ORDER — METHOCARBAMOL 500 MG/1
500 TABLET, FILM COATED ORAL 4 TIMES DAILY PRN
Qty: 20 TABLET | Refills: 1 | Status: SHIPPED | OUTPATIENT
Start: 2024-07-09

## 2024-07-09 RX ORDER — GABAPENTIN 100 MG/1
100-300 CAPSULE ORAL 3 TIMES DAILY PRN
Qty: 30 CAPSULE | Refills: 1 | Status: SHIPPED | OUTPATIENT
Start: 2024-07-09

## 2024-07-09 RX ORDER — RESPIRATORY SYNCYTIAL VIRUS VACCINE 120MCG/0.5
0.5 KIT INTRAMUSCULAR ONCE
Qty: 1 EACH | Refills: 0 | Status: CANCELLED | OUTPATIENT
Start: 2024-07-09 | End: 2024-07-09

## 2024-07-09 ASSESSMENT — PATIENT HEALTH QUESTIONNAIRE - PHQ9
SUM OF ALL RESPONSES TO PHQ QUESTIONS 1-9: 0
10. IF YOU CHECKED OFF ANY PROBLEMS, HOW DIFFICULT HAVE THESE PROBLEMS MADE IT FOR YOU TO DO YOUR WORK, TAKE CARE OF THINGS AT HOME, OR GET ALONG WITH OTHER PEOPLE: NOT DIFFICULT AT ALL
SUM OF ALL RESPONSES TO PHQ QUESTIONS 1-9: 0

## 2024-07-09 ASSESSMENT — PAIN SCALES - GENERAL: PAINLEVEL: NO PAIN (0)

## 2024-07-09 NOTE — TELEPHONE ENCOUNTER
Patient notified of below message, no further questions    Please call patient.  We received MRI report.  It suggests tendinitis in her ankle as suspected, some arthritis, and a chronic sprain of the outer ankle.  She should keep her appointment with Dr. Guidry for further review of the findings.

## 2024-07-09 NOTE — PROGRESS NOTES
Assessment & Plan     Pain in joint, ankle and foot, right  Unclear if this is tendinitis or primary ankle etiology as suspected by Dr. Guidry or whether this may be related to her chronic spinal stenosis with some lumbar radiculopathy.  Will treat the muscle spasm with methocarbamol but also discussed importance of regular exercise, adequate hydration, stretches, counter stretching when spasms occur.  Ice or heat as needed.  Trial of methocarbamol, but caution due to sedating effects.  Could alternatively try gabapentin which can be helpful if this is more of a spinal stenosis etiology.  - methocarbamol (ROBAXIN) 500 MG tablet; Take 1 tablet (500 mg) by mouth 4 times daily as needed for muscle spasms  - gabapentin (NEURONTIN) 100 MG capsule; Take 1-3 capsules (100-300 mg) by mouth 3 times daily as needed for neuropathic pain or other (leg cramp)    Spinal stenosis of lumbar region, unspecified whether neurogenic claudication present  Unclear if this is contributing to picture as above.  Gabapentin as needed as above.  Consider physical therapy.  - methocarbamol (ROBAXIN) 500 MG tablet; Take 1 tablet (500 mg) by mouth 4 times daily as needed for muscle spasms  - gabapentin (NEURONTIN) 100 MG capsule; Take 1-3 capsules (100-300 mg) by mouth 3 times daily as needed for neuropathic pain or other (leg cramp)                Ana Julian is a 78 year old, presenting for the following health issues:  Right foot issue (Night spasm and pain in right foot, went to ER 6-13-24, they referred to ortho. No findings)    Seen in clinic today for evaluation of right foot pain.  Seen initially at urgent to see room mid April at which time a right foot x-ray was normal, had acute spasm in the ankle.  On 6/13/2024 she was seen emergency room again if she had sudden and severe and persisting spasm throughout her right ankle to the extent that she was not able to put weight on it.  It would shoot up her leg.  There an ultrasound  "was negative, no acute findings on ankle x-ray, she had a popliteal cyst incidentally, but was sent home in a cam boot.  Seen by Dr. Guidry at [facility] on 6/28/2024, he question whether there could be some tendinopathy in the peroneal and posterior tibial tendons and therefore scheduled her for an MRI, she has follow-up with him scheduled but he is out of the clinic this week.  She has transitioned out of her boot.  Has not had any severe cramping since then.  Describes the pain as being like a severe sudden cramping or tightness or spasm that then shoots up her leg at times.  Other times he out area of her ankle will be sensitive.  Describes some chronic baseline burning at the bottoms of her feet at night which she attributes to her chronic spinal pain.  Notes history in the past of right lumbar radiculopathy that went to her right lateral ankle and had a similar sensation.    History of Present Illness       Reason for visit:  Foot spasms and pain    She eats 2-3 servings of fruits and vegetables daily.She consumes 0 sweetened beverage(s) daily.She exercises with enough effort to increase her heart rate 20 to 29 minutes per day.  She exercises with enough effort to increase her heart rate 6 days per week.   She is taking medications regularly.                     Objective    /72   Pulse 73   Temp 98.2  F (36.8  C) (Temporal)   Resp 18   Ht 1.721 m (5' 7.75\")   Wt 71.2 kg (157 lb)   LMP  (LMP Unknown)   SpO2 95%   BMI 24.05 kg/m    Body mass index is 24.05 kg/m .  Physical Exam   Alert very pleasant.  Not able to reproduce any focal tenderness to palpation on exam today.  Normal tone.  Good peripheral pulses.  No focal tenderness.            Signed Electronically by: Edie Esqueda MD    "

## 2024-07-09 NOTE — PATIENT INSTRUCTIONS
Stay hydrated.  Exercise regularly.  See attached instructions regarding a nice stretch that can help with lower leg cramps.    When you get severe cramps, actively engage your muscles the point your toe downward as this can help resolve the cramp.  If this does not work, you can try to gently force your foot in that position.  Consider adding a magnesium supplement, either orally or topically.  You may try ice or heat as needed.    I have sent a prescription for the muscle relaxer methocarbamol that can be taken 3 times daily as needed for cramps.  This can cause you to be drowsy, so do not drive after taking it and do not combine with other sedating agents.  If this is more nerve related, I have also sent in a prescription for gabapentin 100 mg capsules, you can take up to 3 capsules as needed for pain, this can also cause drowsiness.

## 2024-07-16 ENCOUNTER — TRANSFERRED RECORDS (OUTPATIENT)
Dept: HEALTH INFORMATION MANAGEMENT | Facility: CLINIC | Age: 79
End: 2024-07-16
Payer: COMMERCIAL

## 2024-07-30 ENCOUNTER — TELEPHONE (OUTPATIENT)
Dept: FAMILY MEDICINE | Facility: CLINIC | Age: 79
End: 2024-07-30
Payer: COMMERCIAL

## 2024-07-30 DIAGNOSIS — F34.1 DYSTHYMIA: Primary | ICD-10-CM

## 2024-07-30 RX ORDER — FLUOXETINE 10 MG/1
10 CAPSULE ORAL DAILY
Qty: 90 CAPSULE | Refills: 3 | Status: SHIPPED | OUTPATIENT
Start: 2024-07-30

## 2024-09-16 ENCOUNTER — TRANSFERRED RECORDS (OUTPATIENT)
Dept: HEALTH INFORMATION MANAGEMENT | Facility: CLINIC | Age: 79
End: 2024-09-16
Payer: COMMERCIAL

## 2024-09-26 ENCOUNTER — TRANSFERRED RECORDS (OUTPATIENT)
Dept: HEALTH INFORMATION MANAGEMENT | Facility: CLINIC | Age: 79
End: 2024-09-26
Payer: COMMERCIAL

## 2025-01-29 ENCOUNTER — OFFICE VISIT (OUTPATIENT)
Dept: FAMILY MEDICINE | Facility: CLINIC | Age: 80
End: 2025-01-29
Payer: COMMERCIAL

## 2025-01-29 ENCOUNTER — ANCILLARY PROCEDURE (OUTPATIENT)
Dept: GENERAL RADIOLOGY | Facility: CLINIC | Age: 80
End: 2025-01-29
Attending: FAMILY MEDICINE
Payer: COMMERCIAL

## 2025-01-29 VITALS
DIASTOLIC BLOOD PRESSURE: 70 MMHG | SYSTOLIC BLOOD PRESSURE: 126 MMHG | TEMPERATURE: 98.1 F | HEART RATE: 75 BPM | WEIGHT: 158 LBS | OXYGEN SATURATION: 99 % | RESPIRATION RATE: 18 BRPM | BODY MASS INDEX: 23.95 KG/M2 | HEIGHT: 68 IN

## 2025-01-29 DIAGNOSIS — M85.80 OSTEOPENIA, UNSPECIFIED LOCATION: ICD-10-CM

## 2025-01-29 DIAGNOSIS — R07.89 CHEST HEAVINESS: Primary | ICD-10-CM

## 2025-01-29 DIAGNOSIS — R53.82 CHRONIC FATIGUE: ICD-10-CM

## 2025-01-29 DIAGNOSIS — I45.10 COMPLETE RIGHT BUNDLE BRANCH BLOCK: ICD-10-CM

## 2025-01-29 DIAGNOSIS — E03.9 HYPOTHYROIDISM, UNSPECIFIED TYPE: ICD-10-CM

## 2025-01-29 DIAGNOSIS — D64.9 NORMOCYTIC ANEMIA: ICD-10-CM

## 2025-01-29 DIAGNOSIS — F34.1 DYSTHYMIA: ICD-10-CM

## 2025-01-29 DIAGNOSIS — R07.89 CHEST HEAVINESS: ICD-10-CM

## 2025-01-29 LAB
ALBUMIN SERPL BCG-MCNC: 4.2 G/DL (ref 3.5–5.2)
ALP SERPL-CCNC: 57 U/L (ref 40–150)
ALT SERPL W P-5'-P-CCNC: 13 U/L (ref 0–50)
ANION GAP SERPL CALCULATED.3IONS-SCNC: 9 MMOL/L (ref 7–15)
AST SERPL W P-5'-P-CCNC: 28 U/L (ref 0–45)
ATRIAL RATE - MUSE: 66 BPM
BILIRUB SERPL-MCNC: 0.2 MG/DL
BUN SERPL-MCNC: 13.4 MG/DL (ref 8–23)
CALCIUM SERPL-MCNC: 9.2 MG/DL (ref 8.8–10.4)
CHLORIDE SERPL-SCNC: 99 MMOL/L (ref 98–107)
CREAT SERPL-MCNC: 0.85 MG/DL (ref 0.51–0.95)
DIASTOLIC BLOOD PRESSURE - MUSE: NORMAL MMHG
EGFRCR SERPLBLD CKD-EPI 2021: 70 ML/MIN/1.73M2
ERYTHROCYTE [DISTWIDTH] IN BLOOD BY AUTOMATED COUNT: 13.7 % (ref 10–15)
FERRITIN SERPL-MCNC: 11 NG/ML (ref 11–328)
GLUCOSE SERPL-MCNC: 65 MG/DL (ref 70–99)
HCO3 SERPL-SCNC: 27 MMOL/L (ref 22–29)
HCT VFR BLD AUTO: 35.8 % (ref 35–47)
HGB BLD-MCNC: 11.4 G/DL (ref 11.7–15.7)
INTERPRETATION ECG - MUSE: NORMAL
IRON BINDING CAPACITY (ROCHE): 423 UG/DL (ref 240–430)
IRON SATN MFR SERPL: 8 % (ref 15–46)
IRON SERPL-MCNC: 32 UG/DL (ref 37–145)
MAGNESIUM SERPL-MCNC: 2.5 MG/DL (ref 1.7–2.3)
MCH RBC QN AUTO: 27.4 PG (ref 26.5–33)
MCHC RBC AUTO-ENTMCNC: 31.8 G/DL (ref 31.5–36.5)
MCV RBC AUTO: 86 FL (ref 78–100)
P AXIS - MUSE: 81 DEGREES
PLATELET # BLD AUTO: 251 10E3/UL (ref 150–450)
POTASSIUM SERPL-SCNC: 4.2 MMOL/L (ref 3.4–5.3)
PR INTERVAL - MUSE: 170 MS
PROT SERPL-MCNC: 6.8 G/DL (ref 6.4–8.3)
QRS DURATION - MUSE: 130 MS
QT - MUSE: 414 MS
QTC - MUSE: 434 MS
R AXIS - MUSE: 31 DEGREES
RBC # BLD AUTO: 4.16 10E6/UL (ref 3.8–5.2)
SODIUM SERPL-SCNC: 135 MMOL/L (ref 135–145)
SYSTOLIC BLOOD PRESSURE - MUSE: NORMAL MMHG
T AXIS - MUSE: 60 DEGREES
T4 FREE SERPL-MCNC: 1.16 NG/DL (ref 0.9–1.7)
TSH SERPL DL<=0.005 MIU/L-ACNC: 4.93 UIU/ML (ref 0.3–4.2)
VENTRICULAR RATE- MUSE: 66 BPM
VIT B12 SERPL-MCNC: 443 PG/ML (ref 232–1245)
VIT D+METAB SERPL-MCNC: 32 NG/ML (ref 20–50)
WBC # BLD AUTO: 5.1 10E3/UL (ref 4–11)

## 2025-01-29 PROCEDURE — 93010 ELECTROCARDIOGRAM REPORT: CPT | Performed by: INTERNAL MEDICINE

## 2025-01-29 PROCEDURE — 82607 VITAMIN B-12: CPT | Performed by: FAMILY MEDICINE

## 2025-01-29 PROCEDURE — 84439 ASSAY OF FREE THYROXINE: CPT | Performed by: FAMILY MEDICINE

## 2025-01-29 PROCEDURE — 83550 IRON BINDING TEST: CPT | Performed by: FAMILY MEDICINE

## 2025-01-29 PROCEDURE — 83540 ASSAY OF IRON: CPT | Performed by: FAMILY MEDICINE

## 2025-01-29 PROCEDURE — 82306 VITAMIN D 25 HYDROXY: CPT | Performed by: FAMILY MEDICINE

## 2025-01-29 PROCEDURE — 85027 COMPLETE CBC AUTOMATED: CPT | Performed by: FAMILY MEDICINE

## 2025-01-29 PROCEDURE — 93005 ELECTROCARDIOGRAM TRACING: CPT | Performed by: FAMILY MEDICINE

## 2025-01-29 PROCEDURE — 83735 ASSAY OF MAGNESIUM: CPT | Performed by: FAMILY MEDICINE

## 2025-01-29 PROCEDURE — 99214 OFFICE O/P EST MOD 30 MIN: CPT | Performed by: FAMILY MEDICINE

## 2025-01-29 PROCEDURE — 82728 ASSAY OF FERRITIN: CPT | Performed by: FAMILY MEDICINE

## 2025-01-29 PROCEDURE — 71046 X-RAY EXAM CHEST 2 VIEWS: CPT | Mod: TC | Performed by: RADIOLOGY

## 2025-01-29 PROCEDURE — 84443 ASSAY THYROID STIM HORMONE: CPT | Performed by: FAMILY MEDICINE

## 2025-01-29 PROCEDURE — 36415 COLL VENOUS BLD VENIPUNCTURE: CPT | Performed by: FAMILY MEDICINE

## 2025-01-29 PROCEDURE — 80053 COMPREHEN METABOLIC PANEL: CPT | Performed by: FAMILY MEDICINE

## 2025-01-29 RX ORDER — FLUOXETINE 10 MG/1
10 CAPSULE ORAL DAILY
Qty: 90 CAPSULE | Refills: 4 | Status: SHIPPED | OUTPATIENT
Start: 2025-01-29

## 2025-01-29 RX ORDER — LEVOTHYROXINE SODIUM 75 UG/1
75 TABLET ORAL DAILY
Qty: 90 TABLET | Refills: 4 | Status: SHIPPED | OUTPATIENT
Start: 2025-01-29

## 2025-01-29 ASSESSMENT — ENCOUNTER SYMPTOMS
FATIGUE: 1
COUGH: 1

## 2025-01-29 ASSESSMENT — PAIN SCALES - GENERAL: PAINLEVEL_OUTOF10: NO PAIN (0)

## 2025-01-29 NOTE — PROGRESS NOTES
Assessment & Plan     Chest heaviness  I am concerned that this is an anginal equivalent as the symptoms seem to come on with activity, resolves with rest.  No ischemic changes on EKG though there is new right bundle branch block.  Unclear if mild new anemia may be contributing.  Chest x-ray unrevealing.  Will check comprehensive metabolic panel.  Order placed for nuclear stress test.  - XR Chest 2 Views; Future  - EKG 12-lead, tracing only  - Comprehensive metabolic panel; Future  - CBC with platelets; Future  - Comprehensive metabolic panel  - CBC with platelets  - NM MPI Treadmill; Future    Complete right bundle branch block  Will check electrolytes, magnesium level.  - Magnesium; Future  - NM MPI Treadmill; Future    Chronic fatigue  Nonspecific.  Will check thyroid cascade, electrolytes, kidney liver function, vitamin D level.  Mild anemia present, unclear if this may be contributing.  Be related to change in cardiac function potentially.  - TSH with free T4 reflex; Future  - Vitamin D Deficiency; Future  - Comprehensive metabolic panel; Future  - CBC with platelets; Future  - TSH with free T4 reflex  - Vitamin D Deficiency  - Comprehensive metabolic panel  - CBC with platelets    Osteopenia, unspecified location  Check vitamin D level today.  - Vitamin D Deficiency; Future  - Vitamin D Deficiency    Hypothyroidism, unspecified type  Will check TSH today, continue levothyroxine.  - levothyroxine (SYNTHROID/LEVOTHROID) 75 MCG tablet; Take 1 tablet (75 mcg) by mouth daily.  - TSH with free T4 reflex; Future  - TSH with free T4 reflex    Normocytic anemia  Is a new finding.  Will assess for iron deficiency, vitamin B12 deficiency, thyroid dysfunction contributing.  - Ferritin; Future  - Vitamin B12; Future  - Iron & Iron Binding Capacity; Future    Dysthymia  Feels provide of fluoxetine.  - FLUoxetine (PROZAC) 10 MG capsule; Take 1 capsule (10 mg) by mouth daily.                Subjective   Iesha is a 79 year  "old, presenting for the following health issues:  Fatigue (Sleeping more than she ever has), Heaviness in chest/right shoulder, Would like thyroid levels checked, and Cough (Gets tickle in throat)    Seen in clinic today to discuss worsening fatigue despite sleeping 8 to 9 hours at night and usually an hour long nap in the daytime.  This is a new change for her.  Associated with this is a sense of heaviness in her chest that sometimes radiates to her right shoulder or upper back under the shoulder blade.  Denies overt pain.  Tends to be worse when she is active, oftentimes will make her feel like she has to sit down and then it improves.  No associated shortness of breath.  Does have a little bit of a cough that seems separate, associated with a tickle in her throat.  Feels like she is blowing her nose a bit more often than usual on her nose has been more itchy.  Known history of aortic stenosis, moderate on echocardiogram in June.  Denies shortness of breath.  No edema.  No known injuries or illness.    History of Present Illness       Reason for visit:  Tiredness  Symptom onset:  More than a month  Symptoms include:  Tiredness chest heavyness  Symptom intensity:  Moderate  Symptom progression:  Staying the same  Had these symptoms before:  No  What makes it worse:  No  What makes it better:  No   She is taking medications regularly.                     Objective    /70   Pulse 75   Temp 98.1  F (36.7  C) (Oral)   Resp 18   Ht 1.721 m (5' 7.75\")   Wt 71.7 kg (158 lb)   LMP  (LMP Unknown)   SpO2 99%   BMI 24.20 kg/m    Body mass index is 24.2 kg/m .  Physical Exam   Alert very pleasant female.  Mucous membranes moist.  Neck supple without of adenopathy or thyromegaly.  Heart with regular rate and rhythm.  2 to sick systolic murmur heard throughout precordium.  Lungs clear and well aerated.  No tenderness palpation over chest wall.  Abdomen soft and nontender.  Extremities without edema.     I ordered " and personally reviewed a twelve-lead EKG which reveals new right bundle branch block but no other ischemic or arrhythmic changes.  Compared to prior EKG she previously had incomplete bundle branch block visible with RSR prime in lead V1.    I ordered and personally reviewed a twelve-lead EKG which shows no focal infiltrates or opacities other than previous benign-appearing granuloma left upper lung which is unchanged.            Signed Electronically by: Edie Esqueda MD

## 2025-01-31 ENCOUNTER — TELEPHONE (OUTPATIENT)
Dept: FAMILY MEDICINE | Facility: CLINIC | Age: 80
End: 2025-01-31
Payer: COMMERCIAL

## 2025-01-31 DIAGNOSIS — D50.9 IRON DEFICIENCY ANEMIA, UNSPECIFIED IRON DEFICIENCY ANEMIA TYPE: Primary | ICD-10-CM

## 2025-01-31 NOTE — TELEPHONE ENCOUNTER
"See PCP's lab result note, to the patient, on 1/31/25:    ----- Message from Edie Esqueda sent at 1/31/2025  3:38 PM CST -----  \"Your thyroid is mildly undertreated, but the balance is fluctuated quite a bit over the last couple years.  Continue levothyroxine at your current dose, and lets plan to recheck this again in the next 3 to 6 months.    Normal kidney and liver function and electrolytes.  It is okay for your blood sugar to be slightly low.  Okay for magnesium to be mildly elevated, we are checking to ensure it was not too low.  We could consider further evaluation of causes of your low iron including colonoscopy and an small amounts of blood loss from your digestive tract could be causing it.    Your iron stores are a bit low, and you are slightly anemic.  This may be contributing to your fatigue.  I recommend adding an iron supplement daily.  You can do so on a multivitamin with iron or by adding an iron supplement such as Vitron-C.    Normal vitamin B12 level, normal vitamin D level.\"    Writer called the patient and relayed the above lab result note to patient, who verbalized understanding.    Stated that she has chronic constipation and anal fissures, for which she is seeing specialty, so she is concerned about taking an iron supplement.    Writer will route the above patient's response to the PCP to review.    Denies other questions or concerns at this time.    Ramona Jenkins RN, BSN  [facility]  "

## 2025-02-03 NOTE — TELEPHONE ENCOUNTER
OK to reheck in 3 months at most.  If fatigue is worsening, or if lightheaded, short of breath, intolerance of activity, needs to be evaluated sooner.  VJ

## 2025-02-03 NOTE — TELEPHONE ENCOUNTER
Writer called and relayed results per provider. Patient verbalized understanding and agrees with plan and has no questions at this time.    Patient needs Hgb and thyroid labs ordered for future. Once order is placed patient will schedule lab recheck.    Routing to provider to place lab orders.    Rina Vora RN  [facility]

## 2025-02-03 NOTE — TELEPHONE ENCOUNTER
Called and relayed message from Dr. Esqueda, patient stated she does not need a list of Dietary iron sources she has that information already.     Patient did want to ask if she can just work on increasing iron in her diet until she comes back for there 3 month lab recheck and then recheck her labs at that time and determine if iron supplements are needed at that time.     Patient stated that she will schedule herself a lab only visit in 3 months, if Dr. Esqueda disagrees with this plan to let her know otherwise no need to reach out at this time.     Sae Johnson RN  [facility]

## 2025-02-03 NOTE — TELEPHONE ENCOUNTER
OK to hold off on formal iron supplement.  Work to increase dietary iron intake.  Please mail or MyChart message patient list of dietary iron sources.  ELIO

## 2025-02-03 NOTE — TELEPHONE ENCOUNTER
See response from the PCP, to the patient, on 2/3/25, regarding appointment scheduling.    Writer called the patient and left a message to return call.    When the patient calls back, please relay the above PCP's message to the patient.    Please route to the RN queue for any questions or concerns.    Writer also sent the patient a [employer] message regarding the above PCP's information.    Ramona Jenkins RN, BSN  [facility]

## 2025-02-04 ENCOUNTER — HOSPITAL ENCOUNTER (OUTPATIENT)
Dept: NUCLEAR MEDICINE | Facility: CLINIC | Age: 80
Discharge: HOME | End: 2025-02-04
Attending: FAMILY MEDICINE
Payer: COMMERCIAL

## 2025-02-04 ENCOUNTER — HOSPITAL ENCOUNTER (OUTPATIENT)
Dept: CARDIOLOGY | Facility: CLINIC | Age: 80
Discharge: HOME | End: 2025-02-04
Attending: FAMILY MEDICINE
Payer: COMMERCIAL

## 2025-02-04 DIAGNOSIS — I45.10 COMPLETE RIGHT BUNDLE BRANCH BLOCK: ICD-10-CM

## 2025-02-04 DIAGNOSIS — R07.89 CHEST HEAVINESS: ICD-10-CM

## 2025-02-04 LAB
CV STRESS CURRENT BP HE: NORMAL
CV STRESS CURRENT HR HE: 104
CV STRESS CURRENT HR HE: 105
CV STRESS CURRENT HR HE: 107
CV STRESS CURRENT HR HE: 109
CV STRESS CURRENT HR HE: 111
CV STRESS CURRENT HR HE: 111
CV STRESS CURRENT HR HE: 113
CV STRESS CURRENT HR HE: 119
CV STRESS CURRENT HR HE: 121
CV STRESS CURRENT HR HE: 125
CV STRESS CURRENT HR HE: 126
CV STRESS CURRENT HR HE: 131
CV STRESS CURRENT HR HE: 131
CV STRESS CURRENT HR HE: 132
CV STRESS CURRENT HR HE: 76
CV STRESS CURRENT HR HE: 80
CV STRESS CURRENT HR HE: 83
CV STRESS CURRENT HR HE: 89
CV STRESS DEVIATION TIME HE: NORMAL
CV STRESS ECHO PERCENT HR HE: NORMAL
CV STRESS EXERCISE STAGE HE: NORMAL
CV STRESS EXERCISE STAGE REACHED HE: NORMAL
CV STRESS FINAL RESTING BP HE: NORMAL
CV STRESS FINAL RESTING HR HE: 80
CV STRESS MAX HR HE: 132
CV STRESS MAX TREADMILL GRADE HE: 12
CV STRESS MAX TREADMILL SPEED HE: 2.5
CV STRESS PEAK DIA BP HE: NORMAL
CV STRESS PEAK SYS BP HE: NORMAL
CV STRESS PHASE HE: NORMAL
CV STRESS PROTOCOL HE: NORMAL
CV STRESS RESTING PT POSITION HE: NORMAL
CV STRESS RESTING PT POSITION HE: NORMAL
CV STRESS ST DEVIATION AMOUNT HE: NORMAL
CV STRESS ST DEVIATION ELEVATION HE: NORMAL
CV STRESS ST EVELATION AMOUNT HE: NORMAL
CV STRESS TEST TYPE HE: NORMAL
CV STRESS TOTAL STAGE TIME MIN 1 HE: NORMAL
NUC STRESS EJECTION FRACTION: 63 %
RATE PRESSURE PRODUCT: NORMAL
STRESS ECHO BASELINE DIASTOLIC HE: 83
STRESS ECHO BASELINE HR: 78
STRESS ECHO BASELINE SYSTOLIC BP: 161
STRESS ECHO CALCULATED PERCENT HR: 92 %
STRESS ECHO LAST STRESS DIASTOLIC BP: 72
STRESS ECHO LAST STRESS HR: 132
STRESS ECHO LAST STRESS SYSTOLIC BP: 140
STRESS ECHO POST ESTIMATED WORKLOAD: 7.1
STRESS ECHO POST EXERCISE DUR MIN: 5
STRESS ECHO POST EXERCISE DUR SEC: 59
STRESS ECHO TARGET HR: 143

## 2025-02-04 PROCEDURE — 93018 CV STRESS TEST I&R ONLY: CPT | Performed by: INTERNAL MEDICINE

## 2025-02-04 PROCEDURE — A9500 TC99M SESTAMIBI: HCPCS | Performed by: FAMILY MEDICINE

## 2025-02-04 PROCEDURE — 343N000001 HC RX 343 MED OP 636: Performed by: FAMILY MEDICINE

## 2025-02-04 PROCEDURE — 93016 CV STRESS TEST SUPVJ ONLY: CPT | Performed by: INTERNAL MEDICINE

## 2025-02-04 PROCEDURE — 78452 HT MUSCLE IMAGE SPECT MULT: CPT

## 2025-02-04 PROCEDURE — 78452 HT MUSCLE IMAGE SPECT MULT: CPT | Mod: 26 | Performed by: INTERNAL MEDICINE

## 2025-02-04 PROCEDURE — 93017 CV STRESS TEST TRACING ONLY: CPT

## 2025-02-04 RX ADMIN — Medication 8.7 MILLICURIE: at 07:25

## 2025-02-04 RX ADMIN — Medication 32.9 MILLICURIE: at 08:30

## 2025-02-04 NOTE — PROGRESS NOTES
Pt having heaviness in the chest and with breathing.  Can come on with both rest and activity.  Also c/o of upper back discomfort.  Pt also states increase fatigue.  Pt has anemia.  Here for further cardiac evaluation.  Rivka Almeida RN

## 2025-02-09 ENCOUNTER — NURSE TRIAGE (OUTPATIENT)
Dept: FAMILY MEDICINE | Facility: CLINIC | Age: 80
End: 2025-02-09
Payer: COMMERCIAL

## 2025-02-10 NOTE — TELEPHONE ENCOUNTER
See response, from the PCP, to the patient, on 2/10/25, regarding nurse triage.    Writer called the patient and relayed the above message to the patient.    Provider Recommendation Follow Up:   Reached patient/caregiver. Informed of provider's recommendations. Patient verbalized understanding and agrees with the plan.          Denies other questions or concerns at this time.    Ramona Jenkins RN, BSN  [facility]

## 2025-02-10 NOTE — TELEPHONE ENCOUNTER
"Nurse Triage SBAR    Is this a 2nd Level Triage? NO, but LICENSED PRACTITIONER REVIEW IS REQUESTED    Situation:   See MyChart message from the patient   When I fell last Tuesday night, I think I may have cracked a rib. I have quite a bit of pain in my left side. I am wondering if I need to have an x ray?     Called and spoke with patient to gather more information regarding fall and ongoing rib pain    Background:   Hx osteopenia,aortic valve stenosis, hypothyroidism, lumbar spinal stenosis    Assessment:   Fell Tuesday 2/4  had stress test that day on heart, not allowed to drink morning of test, wondering if partially dehydrated leading to fall  Was walking to bathroom in the middle of the night, wasn't dizzy and didn't trip, just \"felt herself going sideways\"  left elbow took the brunt of the fall, and then landed on L side, hit side on a shelf  didn't hit head  Feels like she injured her ribs, has ongoing pain in the ribs, certain movements takes her breath away, no visible bruise or swelling, can't bend at the waist without significant pain  No bruising or swelling on ribs  Pain in back, below level of armpit, in ribs on left hand side  Rib pain at time of call 3/10, with movement (get out of chair or sit down) increases to 8/10 momentarily, sharp with movement  can't bend at the waist without significant pain, If bends at all pain \"grabs her\" and takes her breath away  Reports she can function, but must move slowly, states pain is not severe    Also hit elbow, elbow is bruised (2 inches), able to move normally, no significant pain, only painful if pushes on bruise, no visible deformity, no significant swelling    Denies dizziness, fever, weakness or numbness, abdominal pain, blood in urine, difficulty urinating.    Protocol Recommended Disposition:   See in Office Today or Tomorrow, See More Appropriate Protocol    Recommendation: Provided patient with the above disposition and home care advice. Scheduled for " first available appointment at [facility] tomorrow 2/11. Advised to call back to clinic with any difficulty breathing, bruising or swelling in the ribs, severe pain or any new or worsening symptoms. Patient endorses understanding and agrees with plan. Routing to provider to review and advise if appropriate to wait until appointment tomorrow. No need to call patient if ok to wait for appt.    Routed to provider    Does the patient meet one of the following criteria for ADS visit consideration? 16+ years old, with an [facility] PCP     Rina Vora RN  [facility]      TIP  Providers, please consider if this condition is appropriate for management at one of our Acute and Diagnostic Services sites.     If patient is a good candidate, please use dotphrase <dot>triageresponse and select Refer to ADS to document.     Reason for Disposition   Patient has a concerning injury to a specific part of the body (e.g., chest, leg, head)   MODERATE pain (e.g., interferes with normal activities) and high-risk adult (e.g., age > 60 years, osteoporosis, chronic steroid use)   Caller has NON-URGENT question and triager unable to answer question    Additional Information   Negative: Major injury from dangerous force (e.g., fall > 10 feet or 3 meters)   Negative: Major bleeding (e.g., actively dripping or spurting) and can't be stopped   Negative: Shock suspected (e.g., cold/pale/clammy skin, too weak to stand)   Negative: Difficult to awaken or acting confused (e.g., disoriented, slurred speech)   Negative: SEVERE weakness (e.g., unable to walk or barely able to walk, requires support) and new-onset or getting worse   Negative: Can't stand (bear weight) or walk and new-onset after fall   Negative: Sounds like a life-threatening emergency to the triager   Negative: Passed out (e.g., fainted, lost consciousness, blacked out and was not responding)   Negative: Weakness of the face, arm or leg on one side of the body AND  new-onset or getting worse   Negative: Dizziness described as spinning or off balance (vertigo) AND new-onset or getting worse   Negative: Dizziness described as lightheadedness (no spinning sensation or trouble with balance) AND new-onset or getting worse   Negative: Pregnant and fall   Negative: Dangerous mechanism of injury (e.g., MVA, contact sports, trampoline, diving, fall > 10 feet or 3 meters)  (Exception: Back pain began > 1 hour after injury.)   Negative: Weakness (i.e., paralysis, loss of muscle strength) of the leg(s) or foot and sudden onset after back injury   Negative: Numbness (i.e., loss of sensation) of the leg(s) or foot and sudden onset after back injury   Negative: Major bleeding (actively dripping or spurting) that can't be stopped   Negative: Bullet, knife or other serious penetrating wound   Negative: Shock suspected (e.g., cold/pale/clammy skin, too weak to stand, low BP, rapid pulse)   Negative: Sounds like a life-threatening emergency to the triager   Negative: Injury to the neck   Negative: Back pain from overuse (work, exercise, gardening) OR from twisting, lifting, or bending injury   Negative: Back pain not from an injury   Negative: SEVERE pain in kidney area (flank) that follows a direct blow to that site   Negative: Blood in urine (red, pink, or tea-colored)   Negative: Unable to urinate (or only a few drops) > 4 hours and bladder feels very full (e.g., palpable bladder or strong urge to urinate)   Negative: Loss of bladder or bowel control (urine or bowel incontinence; wetting self, leaking stool) of new-onset   Negative: Numbness (loss of sensation) in groin or rectal area   Negative: Skin is split open or gaping (length > 1/2 inch or 12 mm)   Negative: Puncture wound of back   Negative: Bleeding won't stop after 10 minutes of direct pressure (using correct technique)   Negative: Sounds like a serious injury to the triager   Negative: Weakness of a leg or foot (e.g., unable to  bear weight, dragging foot)   Negative: Numbness of a leg or foot (i.e., loss of sensation)   Negative: SEVERE back pain (e.g., excruciating, unable to do any normal activities) and not improved after pain medicine and CARE ADVICE   Negative: Pain radiates into the thigh or further down the leg now   Negative: Landed hard on feet or buttocks and pain over spine   Negative: Large swelling or bruise (> 2 inches or 5 cm)   Negative: No prior tetanus shots (or is not fully vaccinated) and any wound (e.g., cut or scrape)   Negative: HIV positive or severe immunodeficiency (severely weak immune system) and DIRTY cut or scrape   Negative: Patient is confused or is an unreliable provider of information (e.g., dementia, severe intellectual disability, alcohol intoxication)   Negative: Patient wants to be seen   Negative: Last tetanus shot > 5 years ago and DIRTY cut or scrape   Negative: Last tetanus shot > 10 years ago and CLEAN cut or scrape   Negative: Patient has a wound (e.g., cut, puncture, skin tear)   Negative: Injury (or injuries) that need emergency care   Negative: Sounds like a serious injury to the triager   Negative: Muscle pain and dark (cola colored) or red-colored urine   Negative: Unable to get up until help (e.g., caregiver, family, friend) arrived and on the ground 1 hour or more   Negative: Patient sounds very sick or weak to the triager   Negative: MODERATE weakness (e.g., interferes with work, school, normal activities) and new-onset or getting worse   Negative: Fever > 101 F (38.3 C) and age > 60 years   Negative: Fever > 100 F (37.8 C) and bedridden (e.g., CVA, chronic illness, recovering from surgery)   Negative: Fever > 100 F (37.8 C) and diabetes mellitus or weak immune system (e.g., HIV positive, cancer chemo, splenectomy, organ transplant, chronic steroids)   Negative: Caller has URGENT question and triager unable to answer question   Negative: Pale skin (pallor) of new-onset or getting worse    "Negative: No prior tetanus shots (or is not fully vaccinated) and any wound (e.g., cut or scrape)   Negative: HIV positive or severe immunodeficiency (severely weak immune system) and DIRTY cut or scrape   Negative: Last tetanus shot > 5 years ago and DIRTY cut or scrape   Negative: Last tetanus shot > 10 years ago and CLEAN cut or scrape (e.g., object and skin were clean)   Negative: Suspicious history for the fall    Answer Assessment - Initial Assessment Questions  1. MECHANISM: \"How did the fall happen?\"      had stress test that day on heart, not allowed to drink morning of test, wondering if partially dehydrated leading to fall  Was walking to bathroom in the middle of the night  wasn't dizzy and didn't trip, just \"felt herself going sideways\"  2. DOMESTIC VIOLENCE AND ELDER ABUSE SCREENING: \"Did you fall because someone pushed you or tried to hurt you?\" If Yes, ask: \"Are you safe now?\"      denies  3. ONSET: \"When did the fall happen?\" (e.g., minutes, hours, or days ago)      2/4  4. LOCATION: \"What part of the body hit the ground?\" (e.g., back, buttocks, head, hips, knees, hands, head, stomach)      left elbow took the brunt of the fall, and then landed on L side  hit side on a shelf  didn't hit head  5. INJURY: \"Did you hurt (injure) yourself when you fell?\" If Yes, ask: \"What did you injure? Tell me more about this?\" (e.g., body area; type of injury; pain severity)\"      Feels like she injured her ribs, has ongoing pain in the ribs, certain movements takes her breath away, no visible bruise or swelling, can't bend at the waist without significant pain  Also hit elbow, elbow is bruised, able to move normally, no significant pain, only painful if pushes on bruise, no visible deformity, no significant swelling  6. PAIN: \"Is there any pain?\" If Yes, ask: \"How bad is the pain?\" (e.g., Scale 0-10; or none, mild,       At time fo call 3/10, with movement (get out of chair or sit down) increases to 8/10 " "momentarily, sharp with movement  7. SIZE: For cuts, bruises, or swelling, ask: \"How large is it?\" (e.g., inches or centimeters)       No bruising on ribs  Bruising on elbow is 2 inches  8. PREGNANCY: \"Is there any chance you are pregnant?\" \"When was your last menstrual period?\"      NA  9. OTHER SYMPTOMS: \"Do you have any other symptoms?\" (e.g., dizziness, fever, weakness; new-onset or worsening).       Denies dizziness, fever, weakness  10. CAUSE: \"What do you think caused the fall (or falling)?\" (e.g., dizzy spell, tripped)        Don't know what caused the fall, was walking to the bathroom at night time and suddenly \"going sideways\" didn't trip wasn't dizzy    Answer Assessment - Initial Assessment Questions  1. MECHANISM: \"How did the injury happen?\" Note: Consider the possibility of domestic violence or elder abuse.      Fall (on shelf)  2. ONSET: \"When did the injury happen?\" (e.g., minutes or hours ago)      Tuesday night   3. LOCATION: \"What part of the back is injured?\"      below level of armpit, in ribs on left hand side  4. SEVERITY: \"Can you move the back normally?\"      Feels like she injured her ribs, has ongoing pain in the ribs, certain movements takes her breath away, no visible bruise or swelling, can't bend at the waist without significant pain  If bend at all pain \"grabs her\" and takes her breath away  5. PAIN: \"Is there any pain?\" If Yes, ask: \"How bad is the pain?\" (Scale 0-10; or none, mild, moderate, severe)       At time fo call 3/10, with movement (get out of chair or sit down) increases to 8/10 momentarily, sharp with movement  6. SIZE: For cuts, bruises, or swelling, ask: \"How large is it?\" (e.g., inches or centimeters)      No bruising or swelling on ribs  7. TETANUS: For any breaks in the skin, ask: \"When was the last tetanus booster?\"      No breaks in skin  8. NEUROLOGIC SYMPTOMS: Any weakness or numbness of the arms or legs?\"      denies  9. OTHER SYMPTOMS: \"Do you have any other " "symptoms?\" (e.g., abdomen pain, blood in urine)      Denies abdominal pain, blood in urine  10. PREGNANCY: \"Is there any chance you are pregnant?\" \"When was your last menstrual period?\"        NA    Protocols used: Falls and [id number]-UD, Back Injury-A-OH    "

## 2025-02-11 ENCOUNTER — HOSPITAL ENCOUNTER (OUTPATIENT)
Dept: RADIOLOGY | Facility: CLINIC | Age: 80
Discharge: HOME | End: 2025-02-11
Attending: STUDENT IN AN ORGANIZED HEALTH CARE EDUCATION/TRAINING PROGRAM
Payer: COMMERCIAL

## 2025-02-11 ENCOUNTER — MYC MEDICAL ADVICE (OUTPATIENT)
Dept: FAMILY MEDICINE | Facility: CLINIC | Age: 80
End: 2025-02-11

## 2025-02-11 ENCOUNTER — OFFICE VISIT (OUTPATIENT)
Dept: FAMILY MEDICINE | Facility: CLINIC | Age: 80
End: 2025-02-11
Payer: COMMERCIAL

## 2025-02-11 VITALS
HEIGHT: 68 IN | OXYGEN SATURATION: 98 % | HEART RATE: 72 BPM | RESPIRATION RATE: 18 BRPM | BODY MASS INDEX: 23.69 KG/M2 | WEIGHT: 156.3 LBS | SYSTOLIC BLOOD PRESSURE: 136 MMHG | DIASTOLIC BLOOD PRESSURE: 81 MMHG | TEMPERATURE: 99.2 F
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
## 2025-02-11 DIAGNOSIS — M54.9 COSTOVERTEBRAL ANGLE TENDERNESS: ICD-10-CM

## 2025-02-11 DIAGNOSIS — Z29.11 NEED FOR VACCINATION AGAINST RESPIRATORY SYNCYTIAL VIRUS: ICD-10-CM

## 2025-02-11 DIAGNOSIS — D50.9 IRON DEFICIENCY ANEMIA, UNSPECIFIED IRON DEFICIENCY ANEMIA TYPE: ICD-10-CM

## 2025-02-11 DIAGNOSIS — M54.9 COSTOVERTEBRAL ANGLE TENDERNESS: Primary | ICD-10-CM

## 2025-02-11 LAB
ALBUMIN UR-MCNC: NEGATIVE MG/DL
APPEARANCE UR: CLEAR
BACTERIA #/AREA URNS HPF: ABNORMAL /HPF
BILIRUB UR QL STRIP: NEGATIVE
COLOR UR AUTO: YELLOW
GLUCOSE UR STRIP-MCNC: NEGATIVE MG/DL
HGB UR QL STRIP: NEGATIVE
KETONES UR STRIP-MCNC: NEGATIVE MG/DL
LEUKOCYTE ESTERASE UR QL STRIP: ABNORMAL
NITRATE UR QL: NEGATIVE
PH UR STRIP: 7 [PH] (ref 5–8)
RBC #/AREA URNS AUTO: ABNORMAL /HPF
SP GR UR STRIP: 1.02 (ref 1–1.03)
SQUAMOUS #/AREA URNS AUTO: ABNORMAL /LPF
UROBILINOGEN UR STRIP-ACNC: 0.2 E.U./DL
WBC #/AREA URNS AUTO: ABNORMAL /HPF

## 2025-02-11 PROCEDURE — 72070 X-RAY EXAM THORAC SPINE 2VWS: CPT

## 2025-02-11 PROCEDURE — 99214 OFFICE O/P EST MOD 30 MIN: CPT | Performed by: STUDENT IN AN ORGANIZED HEALTH CARE EDUCATION/TRAINING PROGRAM

## 2025-02-11 PROCEDURE — 87086 URINE CULTURE/COLONY COUNT: CPT | Performed by: STUDENT IN AN ORGANIZED HEALTH CARE EDUCATION/TRAINING PROGRAM

## 2025-02-11 PROCEDURE — 81001 URINALYSIS AUTO W/SCOPE: CPT | Performed by: STUDENT IN AN ORGANIZED HEALTH CARE EDUCATION/TRAINING PROGRAM

## 2025-02-11 ASSESSMENT — PAIN SCALES - GENERAL: PAINLEVEL_OUTOF10: SEVERE PAIN (8)

## 2025-02-11 NOTE — PROGRESS NOTES
Assessment and Plan   79-year-old female with relevant past medical history of aortic stenosis and osteopenia who presents after fall that occurred approximately a week ago.  Patient was walking to her bathroom late at night when she somehow fell onto her left side into her dresser.  She did not hit the floor and she did not hit her head at all.  She was able to continue to the bathroom and go back to bed.  The next day she noticed pain in her left elbow as well as her left flank.  Her elbow seems to be improving but her flank is not.  Significantly tender in any sort of movement severely hurts this.  She is needing to take Tylenol 1000 mg every 4 hours at the nighttime to control her pain.  On exam today she has ecchymosis of her left elbow but is really not tender to palpation.  No palpation of the left chest wall or flank and no spinal tenderness on palpation.  She does have left CVA tenderness though.  I think most likely etiology of her pain is left thoracic paraspinal muscle strain given her pain with movements.  Given her CVA tenderness though I am concerned about possible internal problems.  Lack of GI or urinary symptoms though goes against this.  I will check a UA to look for blood in her urine.  If this is present would order CT scan of her abdomen.  If all this testing were normal but her symptoms persist I would also order this.  In the meantime she will use Tylenol for pain control as NSAIDs upset her stomach and she has chronic constipation which she fears opioids will worsen.  She does not feel she needs a muscle relaxer.    2. Costovertebral angle tenderness (Primary)  - UA Macroscopic with reflex to Microscopic and Culture - Lab Collect; Future  - XR Thoracic Spine 2 Views; Future    3. Fall, initial encounter  - UA Macroscopic with reflex to Microscopic and Culture - Lab Collect; Future  - XR Thoracic Spine 2 Views; Future      Follow up: PRN    The longitudinal plan of care for the  "diagnosis(es)/condition(s) as documented were addressed during this visit. Due to the added complexity in care, I will continue to support Iesha in the subsequent management and with ongoing continuity of care.    Dr. Rojelio Hopson         HPI:   Iesha Patel is a 79 year old  female who presents for:    Chief Complaint   Patient presents with    Fall     fall, ongoing rib pain (in upper left back) see nurse triage Fell I week ago      Nurse triage note:  Fell Tuesday 2/4  had stress test that day on heart, not allowed to drink morning of test, wondering if partially dehydrated leading to fall  Was walking to bathroom in the middle of the night, wasn't dizzy and didn't trip, just \"felt herself going sideways\"  left elbow took the brunt of the fall, and then landed on L side, hit side on a shelf  didn't hit head  Feels like she injured her ribs, has ongoing pain in the ribs, certain movements takes her breath away, no visible bruise or swelling, can't bend at the waist without significant pain  No bruising or swelling on ribs  Pain in back, below level of armpit, in ribs on left hand side  Rib pain at time of call 3/10, with movement (get out of chair or sit down) increases to 8/10 momentarily, sharp with movement  can't bend at the waist without significant pain, If bends at all pain \"grabs her\" and takes her breath away  Reports she can function, but must move slowly, states pain is not severe     Also hit elbow, elbow is bruised (2 inches), able to move normally, no significant pain, only painful if pushes on bruise, no visible deformity, no significant swelling     Denies dizziness, fever, weakness or numbness, abdominal pain, blood in urine, difficulty urinating.    Today:  Patient tells me she was getting up to go to the bathroom and fell onto her left side into a dresser. Did not ell onto floor. Did not hit her head. No chest pain or dizziness before this. Did not trip that she can recall. Her elbow has ramírez " "sore but her ribs on the left are very tender.          PMHX:     Patient Active Problem List   Diagnosis    Chronic Constipation    Insomnia    Osteopenia    Aortic valve stenosis, etiology of cardiac valve disease unspecified    Hypothyroidism    Lumbar spinal stenosis    Dysthymia    Chronic constipation with overflow       Social History     Tobacco Use    Smoking status: Never    Smokeless tobacco: Never   Vaping Use    Vaping status: Never Used   Substance Use Topics    Alcohol use: No    Drug use: Not Currently       Social History     Social History Narrative    Diet- Regular    Exercise- Now nothing, walking in past    Senior Living     twice       Allergies   Allergen Reactions    Penicillins Hives     Age 14, hives in mouth       No results found for this or any previous visit (from the past 24 hours).         Review of Systems:    ROS: 10 point ROS neg other than the symptoms noted above in the HPI.         Physical Exam:     Vitals:    02/11/25 0855   BP: 136/81   BP Location: Left arm   Patient Position: Sitting   Cuff Size: Adult Regular   Pulse: 72   Resp: 18   Temp: 99.2  F (37.3  C)   TempSrc: Temporal   SpO2: 98%   Weight: 70.9 kg (156 lb 4.8 oz)   Height: 1.716 m (5' 7.56\")     Body mass index is 24.08 kg/m .    General appearance: Alert, cooperative, no distress, appears stated age  Head: Normocephalic, atraumatic, without obvious abnormality  Eyes: Pupils equal round, reactive.  Conjunctiva clear.  Nose: Nares normal, no drainage.  Throat: Lips, mucosa, tongue normal mucosa pink and moist  Neck: Supple, symmetric, trachea midline,  Lungs: Clear to auscultation bilaterally, no wheezing or crackles present.  Respirations unlabored  Heart/CHEST: Regular rate and rhythm, systolic murmur. No pain with palpation of chest wall or ribs. Left CVA tenderness  Abdomen: Soft, nontender, nondistended.  No masses or organomegaly.  Extremities: Extremities normal, atraumatic.  No cyanosis or " edema.  Skin: Skin color, texture, turgor normal no rashes or lesions on limited skin exam  Neurologic: CN II through XII intact, normal strength.

## 2025-02-12 LAB — BACTERIA UR CULT: NORMAL

## 2025-02-13 NOTE — RESULT ENCOUNTER NOTE
Iesha Patel  Your results from your recent clinic visit show:  Your urine culture was negative    If you have more questions please call the clinic at [phone] or send me a [employer] message    Dr. Rojelio Hopson

## 2025-02-27 ENCOUNTER — LAB (OUTPATIENT)
Dept: LAB | Facility: CLINIC | Age: 80
End: 2025-02-27
Payer: COMMERCIAL
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
## 2025-02-27 DIAGNOSIS — D50.9 IRON DEFICIENCY ANEMIA, UNSPECIFIED IRON DEFICIENCY ANEMIA TYPE: ICD-10-CM

## 2025-02-27 DIAGNOSIS — M54.9 COSTOVERTEBRAL ANGLE TENDERNESS: ICD-10-CM

## 2025-02-27 DIAGNOSIS — Z29.11 NEED FOR VACCINATION AGAINST RESPIRATORY SYNCYTIAL VIRUS: ICD-10-CM

## 2025-02-27 LAB
ERYTHROCYTE [DISTWIDTH] IN BLOOD BY AUTOMATED COUNT: 14.4 % (ref 10–15)
FERRITIN SERPL-MCNC: 12 NG/ML (ref 11–328)
HCT VFR BLD AUTO: 35.8 % (ref 35–47)
HGB BLD-MCNC: 11.6 G/DL (ref 11.7–15.7)
MCH RBC QN AUTO: 27.6 PG (ref 26.5–33)
MCHC RBC AUTO-ENTMCNC: 32.4 G/DL (ref 31.5–36.5)
MCV RBC AUTO: 85 FL (ref 78–100)
PLATELET # BLD AUTO: 235 10E3/UL (ref 150–450)
RBC # BLD AUTO: 4.21 10E6/UL (ref 3.8–5.2)
WBC # BLD AUTO: 4.2 10E3/UL (ref 4–11)

## 2025-03-12 ENCOUNTER — OFFICE VISIT (OUTPATIENT)
Dept: FAMILY MEDICINE | Facility: CLINIC | Age: 80
End: 2025-03-12
Payer: COMMERCIAL

## 2025-03-12 VITALS
OXYGEN SATURATION: 98 % | HEART RATE: 75 BPM | SYSTOLIC BLOOD PRESSURE: 130 MMHG | WEIGHT: 157 LBS | BODY MASS INDEX: 23.79 KG/M2 | RESPIRATION RATE: 16 BRPM | TEMPERATURE: 98 F | HEIGHT: 68 IN | DIASTOLIC BLOOD PRESSURE: 78 MMHG

## 2025-03-12 DIAGNOSIS — D50.9 IRON DEFICIENCY ANEMIA, UNSPECIFIED IRON DEFICIENCY ANEMIA TYPE: Primary | ICD-10-CM

## 2025-03-12 PROCEDURE — 3075F SYST BP GE 130 - 139MM HG: CPT | Performed by: FAMILY MEDICINE

## 2025-03-12 PROCEDURE — 3078F DIAST BP <80 MM HG: CPT | Performed by: FAMILY MEDICINE

## 2025-03-12 PROCEDURE — 99213 OFFICE O/P EST LOW 20 MIN: CPT | Performed by: FAMILY MEDICINE

## 2025-03-12 PROCEDURE — 1126F AMNT PAIN NOTED NONE PRSNT: CPT | Performed by: FAMILY MEDICINE

## 2025-03-12 ASSESSMENT — PAIN SCALES - GENERAL: PAINLEVEL_OUTOF10: NO PAIN (0)

## 2025-03-12 NOTE — PATIENT INSTRUCTIONS
Based on our discussion, I have outlined the following instructions for you:    - Take Vitron C (iron with vitamin C) supplement as directed to help increase your hemoglobin levels.  - Be aware that taking Vitron C may cause your stools to appear black, which is a common side effect.  - Schedule a blood test in one month to check your blood count and see how the treatment is working.  - Apply Voltaren gel to your neck as needed to help relieve discomfort from arthritis.    Thank you again for your visit, and we look forward to supporting you in your journey to better health.

## 2025-03-12 NOTE — PROGRESS NOTES
"  {PROVIDER CHARTING PREFERENCE:958525}    Ana Julian is a 79 year old, presenting for the following health issues:  Follow up low Hgb (What to do, figure out a plan)  {(!) Visit Details have not yet been documented.  Please enter Visit Details and then use this list to pull in documentation. (Optional):876034}  History of Present Illness       Reason for visit:  Follow up on anemia/low hemoglobin    She eats 2-3 servings of fruits and vegetables daily.She consumes 0 sweetened beverage(s) daily.She exercises with enough effort to increase her heart rate 10 to 19 minutes per day.  She exercises with enough effort to increase her heart rate 4 days per week.   She is taking medications regularly.        {MA/LPN/RN Pre-Provider Visit Orders- hCG/UA/Strep (Optional):591912}  {SUPERLIST (Optional):455463}  {additonal problems for provider to add (Optional):274308}    {ROS Picklists (Optional):195411}      Objective    /78   Pulse 75   Temp 98  F (36.7  C) (Oral)   Resp 16   Ht 1.715 m (5' 7.5\")   Wt 71.2 kg (157 lb)   LMP  (LMP Unknown)   SpO2 98%   BMI 24.23 kg/m    Body mass index is 24.23 kg/m .  Physical Exam   {Exam List (Optional):852432}    {Diagnostic Test Results (Optional):386307}        Signed Electronically by: Edie Esqueda MD  {Email feedback regarding this note to [email or web address]   :499086}  "

## 2025-03-12 NOTE — PROGRESS NOTES
Assessment & Plan  Low Hemoglobin  We discussed multiple potential etiologies.  No evidence of poor absorption.  No evidence of blood loss though she is at potential risk.  We discussed consideration of upper endoscopy and colonoscopy, she would like to defer this for now, is understanding that this could be from bleeding ulcer, colon lesion, etc.  Will start by replacing iron.  If able to maintain normal level and not noting any additional changes, will plan to continue monitoring at least every 6 months.  - Low hemoglobin due to iron deficiency. Potential causes include insufficient dietary iron intake and poor absorption, possibly related to IBS and dietary restrictions.  - Start Vitron C (iron with vitamin C) supplement. Recheck blood count in one month. Consider more conservative management initially, with potential for further investigation if symptoms persist or worsen.  - Risks and side effects: Vitron C may cause black stools.    Neck Arthritis  - Neck arthritis causing discomfort.  - Use Voltaren gel as a topical anti-inflammatory alternative to oral ibuprofen.     Subjective   Iesha is a 79 year old, presenting for the following health issues:  Follow up low Hgb (What to do, figure out a plan)    History of Present Illness       Reason for visit:  Follow up on anemia/low hemoglobin    She eats 2-3 servings of fruits and vegetables daily.She consumes 0 sweetened beverage(s) daily.She exercises with enough effort to increase her heart rate 10 to 19 minutes per day.  She exercises with enough effort to increase her heart rate 4 days per week.   She is taking medications regularly.   - Iesha Patel, 79-year-old female.  - Experienced a fall after a nuclear stress test, possibly due to dehydration, resulting in a side injury that took a month to heal.  - Reports chronic stomach issues, including irritable bowel syndrome (IBS) and anal restriction, leading to difficulty with gas and bowel movements.  -  "History of gallbladder removal, which she associates with the onset of IBS symptoms.  - Struggles with dietary iron intake due to difficulty chewing meat and intolerance to leafy greens and beans.  - Low hemoglobin levels, historically on the low end, recently measured at 11.5, indicating iron deficiency.  - No history of colon cancer or ulcers, but had a stomach polyp removed in 1990 or 1991, which was identified as pancreatic tissue.  - Previously experienced severe stomach pain from ibuprofen, leading to discontinuation of its use.                  Objective    /78   Pulse 75   Temp 98  F (36.7  C) (Oral)   Resp 16   Ht 1.715 m (5' 7.5\")   Wt 71.2 kg (157 lb)   LMP  (LMP Unknown)   SpO2 98%   BMI 24.23 kg/m    Body mass index is 24.23 kg/m .  Physical Exam   Alert and very pleasant.  Further exam not performed today.            Signed Electronically by: Edie Esqueda MD    "

## 2025-04-01 ENCOUNTER — PATIENT OUTREACH (OUTPATIENT)
Dept: CARE COORDINATION | Facility: CLINIC | Age: 80
End: 2025-04-01
Payer: COMMERCIAL

## 2025-04-14 ENCOUNTER — LAB (OUTPATIENT)
Dept: LAB | Facility: CLINIC | Age: 80
End: 2025-04-14
Payer: COMMERCIAL

## 2025-04-14 DIAGNOSIS — D50.9 IRON DEFICIENCY ANEMIA, UNSPECIFIED IRON DEFICIENCY ANEMIA TYPE: ICD-10-CM

## 2025-04-14 LAB
FERRITIN SERPL-MCNC: 40 NG/ML (ref 11–328)
HGB BLD-MCNC: 12.1 G/DL (ref 11.7–15.7)

## 2025-04-14 PROCEDURE — 36415 COLL VENOUS BLD VENIPUNCTURE: CPT

## 2025-04-14 PROCEDURE — 85018 HEMOGLOBIN: CPT

## 2025-04-14 PROCEDURE — 82728 ASSAY OF FERRITIN: CPT

## 2025-04-15 ENCOUNTER — PATIENT OUTREACH (OUTPATIENT)
Dept: CARE COORDINATION | Facility: CLINIC | Age: 80
End: 2025-04-15
Payer: COMMERCIAL

## 2025-05-12 ENCOUNTER — OFFICE VISIT (OUTPATIENT)
Dept: FAMILY MEDICINE | Facility: CLINIC | Age: 80
End: 2025-05-12
Payer: COMMERCIAL

## 2025-05-12 VITALS
SYSTOLIC BLOOD PRESSURE: 132 MMHG | HEIGHT: 68 IN | DIASTOLIC BLOOD PRESSURE: 80 MMHG | RESPIRATION RATE: 15 BRPM | WEIGHT: 157.3 LBS | BODY MASS INDEX: 23.84 KG/M2 | TEMPERATURE: 97.9 F | HEART RATE: 76 BPM | OXYGEN SATURATION: 97 %

## 2025-05-12 DIAGNOSIS — R73.01 IMPAIRED FASTING GLUCOSE: ICD-10-CM

## 2025-05-12 DIAGNOSIS — R42 LIGHTHEADEDNESS: Primary | ICD-10-CM

## 2025-05-12 DIAGNOSIS — E03.9 HYPOTHYROIDISM, UNSPECIFIED TYPE: ICD-10-CM

## 2025-05-12 DIAGNOSIS — I35.0 AORTIC VALVE STENOSIS, ETIOLOGY OF CARDIAC VALVE DISEASE UNSPECIFIED: ICD-10-CM

## 2025-05-12 LAB
ERYTHROCYTE [DISTWIDTH] IN BLOOD BY AUTOMATED COUNT: 17 % (ref 10–15)
HCT VFR BLD AUTO: 38.8 % (ref 35–47)
HGB BLD-MCNC: 12.8 G/DL (ref 11.7–15.7)
MCH RBC QN AUTO: 28.7 PG (ref 26.5–33)
MCHC RBC AUTO-ENTMCNC: 33 G/DL (ref 31.5–36.5)
MCV RBC AUTO: 87 FL (ref 78–100)
PLATELET # BLD AUTO: 243 10E3/UL (ref 150–450)
RBC # BLD AUTO: 4.46 10E6/UL (ref 3.8–5.2)
WBC # BLD AUTO: 6 10E3/UL (ref 4–11)

## 2025-05-12 PROCEDURE — 3079F DIAST BP 80-89 MM HG: CPT | Performed by: NURSE PRACTITIONER

## 2025-05-12 PROCEDURE — 80053 COMPREHEN METABOLIC PANEL: CPT | Performed by: NURSE PRACTITIONER

## 2025-05-12 PROCEDURE — 1126F AMNT PAIN NOTED NONE PRSNT: CPT | Performed by: NURSE PRACTITIONER

## 2025-05-12 PROCEDURE — 99214 OFFICE O/P EST MOD 30 MIN: CPT | Performed by: NURSE PRACTITIONER

## 2025-05-12 PROCEDURE — 85027 COMPLETE CBC AUTOMATED: CPT | Performed by: NURSE PRACTITIONER

## 2025-05-12 PROCEDURE — 3075F SYST BP GE 130 - 139MM HG: CPT | Performed by: NURSE PRACTITIONER

## 2025-05-12 PROCEDURE — 84443 ASSAY THYROID STIM HORMONE: CPT | Performed by: NURSE PRACTITIONER

## 2025-05-12 PROCEDURE — 93005 ELECTROCARDIOGRAM TRACING: CPT | Performed by: NURSE PRACTITIONER

## 2025-05-12 PROCEDURE — 36415 COLL VENOUS BLD VENIPUNCTURE: CPT | Performed by: NURSE PRACTITIONER

## 2025-05-12 PROCEDURE — 83036 HEMOGLOBIN GLYCOSYLATED A1C: CPT | Performed by: NURSE PRACTITIONER

## 2025-05-12 PROCEDURE — 93010 ELECTROCARDIOGRAM REPORT: CPT | Performed by: STUDENT IN AN ORGANIZED HEALTH CARE EDUCATION/TRAINING PROGRAM

## 2025-05-12 RX ORDER — ASPIRIN 81 MG/1
81 TABLET ORAL DAILY
COMMUNITY

## 2025-05-12 ASSESSMENT — PAIN SCALES - GENERAL: PAINLEVEL_OUTOF10: NO PAIN (0)

## 2025-05-12 NOTE — PROGRESS NOTES
Assessment & Plan     Lightheadedness  The patient describes lightheadedness when going from seated to standing position.  She has a drop of 8 points in her systolic blood pressure upon standing today so I suspect orthostasis may be contributing. EKG is without any significant change noted, will have cardiology confirm this.  I will obtain blood work and however complete a follow-up echocardiogram since it has been close to a year and she is having new symptoms.  Will also place order for Holter monitor to be completed.  She is advised to follow-up with PCP or myself in upcoming weeks to ensure no new or worsening of symptoms.  - CBC with platelets  - Comprehensive metabolic panel (BMP + Alb, Alk Phos, ALT, AST, Total. Bili, TP)  - EKG 12-lead, tracing only  - Echocardiogram Complete  - Adult Holter Monitor 48 hour  - CBC with platelets  - Comprehensive metabolic panel (BMP + Alb, Alk Phos, ALT, AST, Total. Bili, TP)    Aortic valve stenosis, etiology of cardiac valve disease unspecified  Reviewed history of aortic valve stenosis.  Most recent echocardiogram about a year ago showed some slight worsening of stenosis.  Will refer for follow-up echocardiogram to ensure there is no worsening that may be contributing to her current symptoms.  - EKG 12-lead, tracing only  - Echocardiogram Complete  - Adult Holter Monitor 48 hour    Hypothyroidism, unspecified type  She has hypothyroidism and continues levothyroxine.  I will check thyroid level today to ensure stability.  - TSH with free T4 reflex  - TSH with free T4 reflex        Subjective   Iesha is a 79 year old, presenting for the following health issues:  Dizziness and Bleeding/Bruising        5/12/2025    11:50 AM   Additional Questions   Roomed by Zofia     History of Present Illness       Reason for visit:  Dizzy spells and leg weakness  Symptoms include:  Light headed and weak in the legs  Symptom intensity:  Moderate  Symptom progression:  Worsening  What  "makes it better:  If I sit down for awhile it passes   She is taking medications regularly.        The patient presents today for evaluation of lightheadedness that have occurred intermittently over the last 4 to 5 weeks.  The only time this happens is when she goes from a seated to a standing position.  She has a feeling like her legs will give out on her.  She states that she can \"feel it coming\" before symptoms resolve completely.  Usually within a few seconds after sitting down and the symptoms resolve entirely.  Yesterday the episode was slightly worse and it took longer for her lightheadedness to improve.  She has not fallen as a result of the symptoms.  Symptoms do not occur when going from lying to sitting position.  She does not have any dizziness or vertigo like symptoms.  Symptoms do not occur while walking.  No headaches, change in vision, weakness or difficulty ambulating.  She does have a history of aortic valve stenosis and according to her last echocardiogram her stenosis was slightly worsened.  She was advised to follow-up sooner than the recommended 2-year follow-up if she developed any new symptoms.      Review of Systems - pertinent positives noted in HPI, otherwise ROS is negative.        Objective    /80 (BP Location: Right arm, Patient Position: Sitting, Cuff Size: Adult Regular)   Pulse 76   Temp 97.9  F (36.6  C) (Temporal)   Resp 15   Ht 1.715 m (5' 7.52\")   Wt 71.4 kg (157 lb 4.8 oz)   LMP  (LMP Unknown)   SpO2 97%   BMI 24.26 kg/m    Body mass index is 24.26 kg/m .  Physical Exam   GENERAL: alert and no distress  EYES: Eyes grossly normal to inspection, PERRL and conjunctivae and sclerae normal  HENT: ear canals and TM's normal, nose and mouth without ulcers or lesions  NECK: no adenopathy, no asymmetry, masses, or scars  RESP: lungs clear to auscultation - no rales, rhonchi or wheezes  CV: regular rate and rhythm, normal S1 S2, no S3 or S4, no murmur, click or rub, no " peripheral edema  MS: no gross musculoskeletal defects noted, no edema  NEURO: Normal strength and tone, mentation intact and speech normal  PSYCH: mentation appears normal, affect normal/bright          Signed Electronically by: LORENZO Chan CNP

## 2025-05-13 LAB
ALBUMIN SERPL BCG-MCNC: 4.3 G/DL (ref 3.5–5.2)
ALP SERPL-CCNC: 61 U/L (ref 40–150)
ALT SERPL W P-5'-P-CCNC: 18 U/L (ref 0–50)
ANION GAP SERPL CALCULATED.3IONS-SCNC: 9 MMOL/L (ref 7–15)
AST SERPL W P-5'-P-CCNC: 29 U/L (ref 0–45)
BILIRUB SERPL-MCNC: 0.2 MG/DL
BUN SERPL-MCNC: 14.8 MG/DL (ref 8–23)
CALCIUM SERPL-MCNC: 9.2 MG/DL (ref 8.8–10.4)
CHLORIDE SERPL-SCNC: 99 MMOL/L (ref 98–107)
CREAT SERPL-MCNC: 0.88 MG/DL (ref 0.51–0.95)
EGFRCR SERPLBLD CKD-EPI 2021: 67 ML/MIN/1.73M2
GLUCOSE SERPL-MCNC: 111 MG/DL (ref 70–99)
HCO3 SERPL-SCNC: 28 MMOL/L (ref 22–29)
POTASSIUM SERPL-SCNC: 4.3 MMOL/L (ref 3.4–5.3)
PROT SERPL-MCNC: 6.6 G/DL (ref 6.4–8.3)
SODIUM SERPL-SCNC: 136 MMOL/L (ref 135–145)
TSH SERPL DL<=0.005 MIU/L-ACNC: 3.96 UIU/ML (ref 0.3–4.2)

## 2025-05-14 LAB
ATRIAL RATE - MUSE: 73 BPM
DIASTOLIC BLOOD PRESSURE - MUSE: NORMAL MMHG
INTERPRETATION ECG - MUSE: NORMAL
P AXIS - MUSE: 87 DEGREES
PR INTERVAL - MUSE: 168 MS
QRS DURATION - MUSE: 132 MS
QT - MUSE: 416 MS
QTC - MUSE: 458 MS
R AXIS - MUSE: 54 DEGREES
SYSTOLIC BLOOD PRESSURE - MUSE: NORMAL MMHG
T AXIS - MUSE: 68 DEGREES
VENTRICULAR RATE- MUSE: 73 BPM

## 2025-05-15 ENCOUNTER — RESULTS FOLLOW-UP (OUTPATIENT)
Dept: FAMILY MEDICINE | Facility: CLINIC | Age: 80
End: 2025-05-15

## 2025-05-15 DIAGNOSIS — R73.01 IMPAIRED FASTING GLUCOSE: Primary | ICD-10-CM

## 2025-05-15 LAB
EST. AVERAGE GLUCOSE BLD GHB EST-MCNC: 108 MG/DL
HBA1C MFR BLD: 5.4 % (ref 0–5.6)

## 2025-05-29 ENCOUNTER — MYC MEDICAL ADVICE (OUTPATIENT)
Dept: FAMILY MEDICINE | Facility: CLINIC | Age: 80
End: 2025-05-29
Payer: COMMERCIAL

## 2025-05-30 ENCOUNTER — APPOINTMENT (OUTPATIENT)
Dept: CARDIOLOGY | Facility: CLINIC | Age: 80
End: 2025-05-30
Payer: COMMERCIAL

## 2025-05-30 ENCOUNTER — APPOINTMENT (OUTPATIENT)
Dept: MRI IMAGING | Facility: CLINIC | Age: 80
End: 2025-05-30
Attending: STUDENT IN AN ORGANIZED HEALTH CARE EDUCATION/TRAINING PROGRAM
Payer: COMMERCIAL

## 2025-05-30 ENCOUNTER — HOSPITAL ENCOUNTER (INPATIENT)
Facility: CLINIC | Age: 80
LOS: 1 days | Discharge: HOME | End: 2025-05-31
Attending: STUDENT IN AN ORGANIZED HEALTH CARE EDUCATION/TRAINING PROGRAM
Payer: COMMERCIAL

## 2025-05-30 DIAGNOSIS — I63.9 ACUTE CVA (CEREBROVASCULAR ACCIDENT) (H): ICD-10-CM

## 2025-05-30 DIAGNOSIS — Z74.09 IMPAIRED FUNCTIONAL MOBILITY, BALANCE, GAIT, AND ENDURANCE: Primary | ICD-10-CM

## 2025-05-30 DIAGNOSIS — Z71.89 OTHER SPECIFIED COUNSELING: Chronic | ICD-10-CM

## 2025-05-30 PROBLEM — I35.0 AORTIC VALVE STENOSIS, ETIOLOGY OF CARDIAC VALVE DISEASE UNSPECIFIED: Status: ACTIVE | Noted: 2017-04-10

## 2025-05-30 PROBLEM — E03.9 HYPOTHYROIDISM: Status: ACTIVE | Noted: 2021-12-09

## 2025-05-30 LAB
ANION GAP SERPL CALCULATED.3IONS-SCNC: 11 MMOL/L (ref 7–15)
ATRIAL RATE - MUSE: 71 BPM
BASOPHILS # BLD AUTO: 0.1 10E3/UL (ref 0–0.2)
BASOPHILS NFR BLD AUTO: 1 %
BUN SERPL-MCNC: 10.3 MG/DL (ref 8–23)
CALCIUM SERPL-MCNC: 9 MG/DL (ref 8.8–10.4)
CHLORIDE SERPL-SCNC: 99 MMOL/L (ref 98–107)
CREAT SERPL-MCNC: 0.81 MG/DL (ref 0.51–0.95)
DIASTOLIC BLOOD PRESSURE - MUSE: NORMAL MMHG
EGFRCR SERPLBLD CKD-EPI 2021: 74 ML/MIN/1.73M2
EOSINOPHIL # BLD AUTO: 0.2 10E3/UL (ref 0–0.7)
EOSINOPHIL NFR BLD AUTO: 4 %
ERYTHROCYTE [DISTWIDTH] IN BLOOD BY AUTOMATED COUNT: 16.4 % (ref 10–15)
EST. AVERAGE GLUCOSE BLD GHB EST-MCNC: 117 MG/DL
GLUCOSE BLDC GLUCOMTR-MCNC: 101 MG/DL (ref 70–99)
GLUCOSE BLDC GLUCOMTR-MCNC: 130 MG/DL (ref 70–99)
GLUCOSE BLDC GLUCOMTR-MCNC: 97 MG/DL (ref 70–99)
GLUCOSE SERPL-MCNC: 60 MG/DL (ref 70–99)
HBA1C MFR BLD: 5.7 %
HCO3 SERPL-SCNC: 26 MMOL/L (ref 22–29)
HCT VFR BLD AUTO: 41.1 % (ref 35–47)
HGB BLD-MCNC: 13.3 G/DL (ref 11.7–15.7)
HOLD SPECIMEN: NORMAL
IMM GRANULOCYTES # BLD: 0 10E3/UL
IMM GRANULOCYTES NFR BLD: 0 %
INTERPRETATION ECG - MUSE: NORMAL
LVEF ECHO: NORMAL
LYMPHOCYTES # BLD AUTO: 1.1 10E3/UL (ref 0.8–5.3)
LYMPHOCYTES NFR BLD AUTO: 24 %
MCH RBC QN AUTO: 29 PG (ref 26.5–33)
MCHC RBC AUTO-ENTMCNC: 32.4 G/DL (ref 31.5–36.5)
MCV RBC AUTO: 90 FL (ref 78–100)
MONOCYTES # BLD AUTO: 0.5 10E3/UL (ref 0–1.3)
MONOCYTES NFR BLD AUTO: 12 %
NEUTROPHILS # BLD AUTO: 2.6 10E3/UL (ref 1.6–8.3)
NEUTROPHILS NFR BLD AUTO: 58 %
NRBC # BLD AUTO: 0 10E3/UL
NRBC BLD AUTO-RTO: 0 /100
P AXIS - MUSE: 86 DEGREES
PLATELET # BLD AUTO: 218 10E3/UL (ref 150–450)
POTASSIUM SERPL-SCNC: 3.9 MMOL/L (ref 3.4–5.3)
PR INTERVAL - MUSE: 174 MS
QRS DURATION - MUSE: 130 MS
QT - MUSE: 416 MS
QTC - MUSE: 452 MS
R AXIS - MUSE: 37 DEGREES
RBC # BLD AUTO: 4.58 10E6/UL (ref 3.8–5.2)
SODIUM SERPL-SCNC: 136 MMOL/L (ref 135–145)
SYSTOLIC BLOOD PRESSURE - MUSE: NORMAL MMHG
T AXIS - MUSE: 83 DEGREES
TROPONIN T SERPL HS-MCNC: 11 NG/L
VENTRICULAR RATE- MUSE: 71 BPM
WBC # BLD AUTO: 4.4 10E3/UL (ref 4–11)

## 2025-05-30 PROCEDURE — 93306 TTE W/DOPPLER COMPLETE: CPT

## 2025-05-30 PROCEDURE — 84484 ASSAY OF TROPONIN QUANT: CPT

## 2025-05-30 PROCEDURE — 36415 COLL VENOUS BLD VENIPUNCTURE: CPT | Performed by: STUDENT IN AN ORGANIZED HEALTH CARE EDUCATION/TRAINING PROGRAM

## 2025-05-30 PROCEDURE — 255N000002 HC RX 255 OP 636: Performed by: STUDENT IN AN ORGANIZED HEALTH CARE EDUCATION/TRAINING PROGRAM

## 2025-05-30 PROCEDURE — 85025 COMPLETE CBC W/AUTO DIFF WBC: CPT | Performed by: STUDENT IN AN ORGANIZED HEALTH CARE EDUCATION/TRAINING PROGRAM

## 2025-05-30 PROCEDURE — 93306 TTE W/DOPPLER COMPLETE: CPT | Mod: 26 | Performed by: STUDENT IN AN ORGANIZED HEALTH CARE EDUCATION/TRAINING PROGRAM

## 2025-05-30 PROCEDURE — 70553 MRI BRAIN STEM W/O & W/DYE: CPT

## 2025-05-30 PROCEDURE — 99222 1ST HOSP IP/OBS MODERATE 55: CPT | Mod: FS | Performed by: HOSPITALIST

## 2025-05-30 PROCEDURE — 99223 1ST HOSP IP/OBS HIGH 75: CPT | Mod: FS

## 2025-05-30 PROCEDURE — 80048 BASIC METABOLIC PNL TOTAL CA: CPT | Performed by: STUDENT IN AN ORGANIZED HEALTH CARE EDUCATION/TRAINING PROGRAM

## 2025-05-30 PROCEDURE — 93005 ELECTROCARDIOGRAM TRACING: CPT | Performed by: STUDENT IN AN ORGANIZED HEALTH CARE EDUCATION/TRAINING PROGRAM

## 2025-05-30 PROCEDURE — 70549 MR ANGIOGRAPH NECK W/O&W/DYE: CPT

## 2025-05-30 PROCEDURE — 120N000004 HC R&B MS OVERFLOW

## 2025-05-30 PROCEDURE — 99207 PR NO CHARGE LOS: CPT | Performed by: PHYSICIAN ASSISTANT

## 2025-05-30 PROCEDURE — 82962 GLUCOSE BLOOD TEST: CPT

## 2025-05-30 PROCEDURE — A9585 GADOBUTROL INJECTION: HCPCS | Performed by: STUDENT IN AN ORGANIZED HEALTH CARE EDUCATION/TRAINING PROGRAM

## 2025-05-30 PROCEDURE — 85014 HEMATOCRIT: CPT | Performed by: STUDENT IN AN ORGANIZED HEALTH CARE EDUCATION/TRAINING PROGRAM

## 2025-05-30 PROCEDURE — 70544 MR ANGIOGRAPHY HEAD W/O DYE: CPT

## 2025-05-30 PROCEDURE — 99285 EMERGENCY DEPT VISIT HI MDM: CPT | Mod: 25

## 2025-05-30 PROCEDURE — 83036 HEMOGLOBIN GLYCOSYLATED A1C: CPT

## 2025-05-30 PROCEDURE — 82435 ASSAY OF BLOOD CHLORIDE: CPT | Performed by: STUDENT IN AN ORGANIZED HEALTH CARE EDUCATION/TRAINING PROGRAM

## 2025-05-30 PROCEDURE — 250N000013 HC RX MED GY IP 250 OP 250 PS 637: Performed by: STUDENT IN AN ORGANIZED HEALTH CARE EDUCATION/TRAINING PROGRAM

## 2025-05-30 PROCEDURE — 250N000013 HC RX MED GY IP 250 OP 250 PS 637

## 2025-05-30 RX ORDER — ACETAMINOPHEN 325 MG/10.15ML
650 LIQUID ORAL EVERY 4 HOURS PRN
Status: DISCONTINUED | OUTPATIENT
Start: 2025-05-30 | End: 2025-05-31 | Stop reason: HOSPADM

## 2025-05-30 RX ORDER — MAGNESIUM OXIDE 400 MG/1
400 TABLET ORAL DAILY
Status: DISCONTINUED | OUTPATIENT
Start: 2025-05-31 | End: 2025-05-31 | Stop reason: HOSPADM

## 2025-05-30 RX ORDER — FLUOROMETHOLONE 1 MG/ML
2 SUSPENSION/ DROPS OPHTHALMIC DAILY
Status: DISCONTINUED | OUTPATIENT
Start: 2025-05-31 | End: 2025-05-31 | Stop reason: HOSPADM

## 2025-05-30 RX ORDER — NICOTINE POLACRILEX 4 MG
15-30 LOZENGE BUCCAL
Status: DISCONTINUED | OUTPATIENT
Start: 2025-05-30 | End: 2025-05-31 | Stop reason: HOSPADM

## 2025-05-30 RX ORDER — DEXTROSE MONOHYDRATE 25 G/50ML
25-50 INJECTION, SOLUTION INTRAVENOUS
Status: DISCONTINUED | OUTPATIENT
Start: 2025-05-30 | End: 2025-05-31 | Stop reason: HOSPADM

## 2025-05-30 RX ORDER — ACETAMINOPHEN 650 MG/1
650 SUPPOSITORY RECTAL EVERY 4 HOURS PRN
Status: DISCONTINUED | OUTPATIENT
Start: 2025-05-30 | End: 2025-05-31 | Stop reason: HOSPADM

## 2025-05-30 RX ORDER — LIDOCAINE 40 MG/G
CREAM TOPICAL
Status: DISCONTINUED | OUTPATIENT
Start: 2025-05-30 | End: 2025-05-31 | Stop reason: HOSPADM

## 2025-05-30 RX ORDER — ACETAMINOPHEN 325 MG/1
650 TABLET ORAL EVERY 4 HOURS PRN
Status: DISCONTINUED | OUTPATIENT
Start: 2025-05-30 | End: 2025-05-31 | Stop reason: HOSPADM

## 2025-05-30 RX ORDER — CLOPIDOGREL BISULFATE 75 MG/1
300 TABLET ORAL ONCE
Status: COMPLETED | OUTPATIENT
Start: 2025-05-30 | End: 2025-05-30

## 2025-05-30 RX ORDER — LEVOTHYROXINE SODIUM 75 UG/1
75 TABLET ORAL DAILY
Status: DISCONTINUED | OUTPATIENT
Start: 2025-05-31 | End: 2025-05-31 | Stop reason: HOSPADM

## 2025-05-30 RX ORDER — ASPIRIN 325 MG
325 TABLET ORAL ONCE
Status: COMPLETED | OUTPATIENT
Start: 2025-05-30 | End: 2025-05-30

## 2025-05-30 RX ORDER — FLUOXETINE 10 MG/1
10 CAPSULE ORAL DAILY
Status: DISCONTINUED | OUTPATIENT
Start: 2025-05-31 | End: 2025-05-31 | Stop reason: HOSPADM

## 2025-05-30 RX ORDER — ASPIRIN 81 MG/1
81 TABLET ORAL DAILY
Status: DISCONTINUED | OUTPATIENT
Start: 2025-05-31 | End: 2025-05-30

## 2025-05-30 RX ORDER — ASPIRIN 81 MG/1
81 TABLET, CHEWABLE ORAL DAILY
Status: DISCONTINUED | OUTPATIENT
Start: 2025-05-31 | End: 2025-05-30

## 2025-05-30 RX ORDER — POLYETHYLENE GLYCOL 3350 17 G/17G
17 POWDER, FOR SOLUTION ORAL DAILY
Status: DISCONTINUED | OUTPATIENT
Start: 2025-05-31 | End: 2025-05-31 | Stop reason: HOSPADM

## 2025-05-30 RX ORDER — ONDANSETRON 4 MG/1
4 TABLET, ORALLY DISINTEGRATING ORAL EVERY 6 HOURS PRN
Status: DISCONTINUED | OUTPATIENT
Start: 2025-05-30 | End: 2025-05-31 | Stop reason: HOSPADM

## 2025-05-30 RX ORDER — ATORVASTATIN CALCIUM 40 MG/1
40 TABLET, FILM COATED ORAL EVERY EVENING
Status: DISCONTINUED | OUTPATIENT
Start: 2025-05-30 | End: 2025-05-31 | Stop reason: HOSPADM

## 2025-05-30 RX ORDER — ONDANSETRON 2 MG/ML
4 INJECTION INTRAMUSCULAR; INTRAVENOUS EVERY 6 HOURS PRN
Status: DISCONTINUED | OUTPATIENT
Start: 2025-05-30 | End: 2025-05-31 | Stop reason: HOSPADM

## 2025-05-30 RX ORDER — GADOBUTROL 604.72 MG/ML
10 INJECTION INTRAVENOUS ONCE
Status: COMPLETED | OUTPATIENT
Start: 2025-05-30 | End: 2025-05-30

## 2025-05-30 RX ORDER — POLYETHYLENE GLYCOL 3350 17 G/17G
1 POWDER, FOR SOLUTION ORAL DAILY
COMMUNITY

## 2025-05-30 RX ORDER — ASPIRIN 81 MG/1
81 TABLET ORAL DAILY
Status: DISCONTINUED | OUTPATIENT
Start: 2025-05-31 | End: 2025-05-31

## 2025-05-30 RX ORDER — CLOPIDOGREL BISULFATE 75 MG/1
75 TABLET ORAL DAILY
Status: DISCONTINUED | OUTPATIENT
Start: 2025-05-31 | End: 2025-05-31

## 2025-05-30 RX ADMIN — GADOBUTROL 10 ML: 604.72 INJECTION INTRAVENOUS at 10:34

## 2025-05-30 RX ADMIN — ATORVASTATIN CALCIUM 40 MG: 40 TABLET, FILM COATED ORAL at 20:57

## 2025-05-30 RX ADMIN — CLOPIDOGREL BISULFATE 300 MG: 75 TABLET, FILM COATED ORAL at 12:31

## 2025-05-30 RX ADMIN — ASPIRIN 325 MG ORAL TABLET 325 MG: 325 PILL ORAL at 12:31

## 2025-05-30 ASSESSMENT — ENCOUNTER SYMPTOMS
NAUSEA: 0
HEARTBURN: 0
MYALGIAS: 0
FEVER: 0
SEIZURES: 0
SHORTNESS OF BREATH: 0
FLANK PAIN: 0
COUGH: 0
WHEEZING: 0
DEPRESSION: 0
VOMITING: 0
PALPITATIONS: 0
ORTHOPNEA: 0
DIAPHORESIS: 0
WEAKNESS: 0
TINGLING: 0
FREQUENCY: 0
DIZZINESS: 1
WEIGHT LOSS: 0
NECK PAIN: 0
CHILLS: 0
FALLS: 0
ABDOMINAL PAIN: 0

## 2025-05-30 ASSESSMENT — COLUMBIA-SUICIDE SEVERITY RATING SCALE - C-SSRS
2. HAVE YOU ACTUALLY HAD ANY THOUGHTS OF KILLING YOURSELF IN THE PAST MONTH?: NO
1. IN THE PAST MONTH, HAVE YOU WISHED YOU WERE DEAD OR WISHED YOU COULD GO TO SLEEP AND NOT WAKE UP?: NO
6. HAVE YOU EVER DONE ANYTHING, STARTED TO DO ANYTHING, OR PREPARED TO DO ANYTHING TO END YOUR LIFE?: NO

## 2025-05-30 ASSESSMENT — ACTIVITIES OF DAILY LIVING (ADL)
ADLS_ACUITY_SCORE: 41
ADLS_ACUITY_SCORE: 41
ADLS_ACUITY_SCORE: 50
ADLS_ACUITY_SCORE: 30
ADLS_ACUITY_SCORE: 30
ADLS_ACUITY_SCORE: 41
ADLS_ACUITY_SCORE: 41
ADLS_ACUITY_SCORE: 30
ADLS_ACUITY_SCORE: 30
ADLS_ACUITY_SCORE: 41
ADLS_ACUITY_SCORE: 41
ADLS_ACUITY_SCORE: 30
ADLS_ACUITY_SCORE: 41
ADLS_ACUITY_SCORE: 30
ADLS_ACUITY_SCORE: 41
ADLS_ACUITY_SCORE: 41

## 2025-05-30 NOTE — PHARMACY-ADMISSION MEDICATION HISTORY
Pharmacist Admission Medication History    Admission medication history is complete. The information provided in this note is only as accurate as the sources available at the time of the update.    Information Source(s): Patient via in-person    Pertinent Information:       Changes made to PTA medication list:  Added: milk of mag, miralax  Deleted: None  Changed: None    Allergies reviewed with patient and updates made in EHR: yes    Medication History Completed By: Jenaro Enriquez RPH 5/30/2025 12:18 PM    PTA Med List   Medication Sig Last Dose/Taking    aspirin 81 MG EC tablet Take 81 mg by mouth daily. 5/29/2025    fluorometholone (FML LIQUIFILM) 0.1 % ophthalmic suspension Place 2 drops into both eyes daily 5/30/2025    FLUoxetine (PROZAC) 10 MG capsule Take 1 capsule (10 mg) by mouth daily. 5/30/2025    levothyroxine (SYNTHROID/LEVOTHROID) 75 MCG tablet Take 1 tablet (75 mcg) by mouth daily. 5/30/2025    Magnesium Oxide, Laxative, (LAX) 500 MG TABS tablet Take 400 mg by mouth daily. Milk of mag tablet for constipation 5/30/2025    methylcellulose (CITRUCEL) powder Take 1 Tablespoonful by mouth daily 5/30/2025    polyethylene glycol (MIRALAX) 17 g packet Take 1 packet by mouth daily. 5/30/2025   dillon

## 2025-05-30 NOTE — PROGRESS NOTES
Hospitalist follow up note:    Spoke with ED physician regarding new CVA, questionable acute vasculitis on imaging. Will hold off on accepting pt at  until formal neurology evaluation. Not sure if she will need any other workup or specialty care that is not available at this facility.     González Black, DO   Pager #: [phone]

## 2025-05-30 NOTE — CONSULTS
Care Management Initial Consult      General Information  Assessment completed with: Patient, Children, Pt and a daughter  Type of CM/SW Visit: Initial Assessment  Primary Care Provider verified and updated as needed: Yes   Readmission within the last 30 days: no previous admission in last 30 days   Reason for Consult: discharge planning, health care directive, transportation  Advance Care Planning: Advance Care Planning Reviewed: present on chart  Found in EHR     Communication Assessment  Patient's communication style: spoken language (English or Bilingual)        Cognitive  Cognitive/Neuro/Behavioral: WDL except (reports unsteady when walking and has L sided visual deficit but not an issue when sitting for eval.)    Level of Consciousness: alert    Arousal Level: opens eyes spontaneously    Orientation: oriented x 4          Speech: clear, spontaneous, logical    Living Environment:   People in home: alone     Current living Arrangements: apartment ([address] building)      Able to return to prior arrangements: yes     Family/Social Support:  Care provided by: self, child(fransisca)  Provides care for: no one  Marital Status:   Support system: Children, Other (specify) (Grandchildren)          Description of Support System: Supportive, Involved    Support Assessment: Adequate family and caregiver support, Adequate social supports    Current Resources:   Patient receiving home care services: No  Community Resources: None  Equipment currently used at home: walker, rolling (As needed)  Supplies currently used at home: Incontinence Supplies    Employment/Financial:  Employment Status: retired     Financial Concerns: none   Referral to Financial Worker: No     Does the patient's insurance plan have a 3 day qualifying hospital stay waiver?  Yes     Which insurance plan 3 day waiver is available? Alternative insurance waiver    Will the waiver be used for post-acute placement? No    Lifestyle & Psychosocial Needs:  Social  Drivers of Health     Food Insecurity: Low Risk  (5/1/2024)    Food Insecurity     Within the past 12 months, did you worry that your food would run out before you got money to buy more?: No     Within the past 12 months, did the food you bought just not last and you didn t have money to get more?: No   Depression: Not at risk (1/29/2025)    PHQ-2     PHQ-2 Score: 0   Housing Stability: Low Risk  (5/1/2024)    Housing Stability     Do you have housing? : Yes     Are you worried about losing your housing?: No   Tobacco Use: Low Risk  (5/12/2025)    Patient History     Smoking Tobacco Use: Never     Smokeless Tobacco Use: Never     Passive Exposure: Not on file   Financial Resource Strain: Low Risk  (5/1/2024)    Financial Resource Strain     Within the past 12 months, have you or your family members you live with been unable to get utilities (heat, electricity) when it was really needed?: No   Alcohol Use: Not on file   Transportation Needs: Low Risk  (5/1/2024)    Transportation Needs     Within the past 12 months, has lack of transportation kept you from medical appointments, getting your medicines, non-medical meetings or appointments, work, or from getting things that you need?: No   Physical Activity: Unknown (5/1/2024)    Exercise Vital Sign     Days of Exercise per Week: 7 days     Minutes of Exercise per Session: Not on file   Interpersonal Safety: Low Risk  (1/29/2025)    Interpersonal Safety     Do you feel physically and emotionally safe where you currently live?: Yes     Within the past 12 months, have you been hit, slapped, kicked or otherwise physically hurt by someone?: No     Within the past 12 months, have you been humiliated or emotionally abused in other ways by your partner or ex-partner?: No   Stress: No Stress Concern Present (5/1/2024)    [facility] - Occupational Stress Questionnaire     Feeling of Stress : Not at all   Social Connections: Unknown (5/1/2024)     "Social Connection and Isolation Panel [NHANES]     Frequency of Communication with Friends and Family: Not on file     Frequency of Social Gatherings with Friends and Family: Three times a week     Attends Scientologist Services: Not on file     Active Member of Clubs or Organizations: Not on file     Attends Club or Organization Meetings: Not on file     Marital Status: Not on file   Health Literacy: Not on file     Functional Status:  Prior to admission patient needed assistance:   Dependent ADLs:: Ambulation-walker, Independent  Dependent IADLs:: Incontinence, Independent  Assessment of Functional Status: Not at  functional baseline    Mental Health Status:  Mental Health Status: No Current Concerns       Chemical Dependency Status:  Chemical Dependency Status: No Current Concerns           Values/Beliefs:  Spiritual, Cultural Beliefs, Scientologist Practices, Values that affect care: no             Discussed  Partnership in Safe Discharge Planning  document with patient/family: Yes    Additional Information:  Writer met with pt and her daughter to introduce Care Management(CM), obtain an initial assessment, and offer discharge support. Pt resides in a Senior Apartment alone and states total I/ADL independence when at baseline. No in-home services. Walker use as needed. Pt/family stated no concerns with eventual return home at discharge.    5/30 per MARGO Allen-\"79 year old female who has a PMH of HTN, aortic valve stenosis, hypothyroidism, mood disorder, ongoing dizzy spells for the past couple of months presented to the ER for evaluation of left-sided peripheral vision loss since Monday.  Patient reports she was seen by the eye doctor on Tuesday reportedly everything was unremarkable.  Wednesday she continued to have worsening left sided peripheral vision loss worse with movement such as driving or walking prompting her to seek emergency medical treatment. She has never experienced floaters within her vision " "or curtain coming down. She denies chest pain, shortness of breath, unilateral weakness, paresthesias, slurred speech, or facial droop. Does admit to feeling lightheaded for the past 3 months describing as feeling \"off centered\" when walking.  She had a fall several months ago where she describes she fell sideways.  She has seen her primary care for this lightheadedness. They had check orthostatic blood pressure and her \"BP dropped\".  She denies any tinnitus but states lightheadedness is typically worse with positional changes.Patient reports no personal history of stroke but does have significant familial history, her father passed away at the age of 60 due to recurrent strokes.  She denies smoking, alcohol use or recreational drug use.\"    Next Steps:   REHAB, SLP, PT, and OT Consults pending. Anticipate improvement and return to home at time of discharge. CM to follow-up on consults and assist with service coordination needs as indicated/desired by pt.    Manuel Jensen RN      "

## 2025-05-30 NOTE — PROGRESS NOTES
"[facility] ED Handoff Report    ED Chief Complaint: Left sided vision change    ED Diagnosis:  (I63.9) Acute CVA (cerebrovascular accident) (H)  Comment:   Plan: Aspirin and plavix given       PMH:    Past Medical History:   Diagnosis Date    Atrial fibrillation (H)     single episode never repeated, two separate monitor instances    Endometrial cancer (H) 2000    Insomnia    Aortic valve stenosis  Hypothyroidism  Mood disorder    Code Status:  No CPR- Do NOT Intubate     Falls Risk: Yes Band: Applied    Current Living Situation/Residence: lives in an apartment     Elimination Status: Continent: Yes     Activity Level: SBA w/ walker    Patient's Preferred Language:  English     Needed: No    Vital Signs:  BP (!) 172/84   Pulse 72   Temp 98.1  F (36.7  C) (Oral)   Resp 16   Ht 1.715 m (5' 7.5\")   Wt 70.8 kg (156 lb)   LMP  (LMP Unknown)   SpO2 97%   BMI 24.07 kg/m       Cardiac Rhythm: SR with BBB    Pain Score: 0/10    Is the Patient Confused:  No    Last Food or Drink: 05/30/25 at 1600    Assessment and Plan of Care:  Pt A&O. Continue q4 neuro checks.    Tests Performed: Done: Labs and Imaging    Treatments Provided:  ECHO, MRA head    Family Dynamics/Concerns: No    Belongings Checklist Done and Signed by Patient: No    Belongings Sent with Patient: Yes: clothing, purse, smart phone    Boarding medications sent with patient: Drops    Additional Information: Pt presented with reports of blurriness in left visual field since Monday. Went to eye doctor where findings were unremarkable. Presented to ED and found to have CVA. Denies vision changes at rest, some blurriness on left when scanning and walking. Reports feeling unsteady with movement. Bilateral hand  and ankle  strong. PIV patent and SL. Ambulates SBA to bathroom and appears steady. May benefit from use of walker for walking distance. Pt swallows well. ACHS blood sugars. Hypertensive in 170s.     RN: Liv Sanchez RN " 5/30/2025 5:02 PM

## 2025-05-30 NOTE — ED PROVIDER NOTES
"EMERGENCY DEPARTMENT ENCOUNTER      NAME: Iesha Patel  AGE: 79 year old female  YOB: 1945  MRN: [id number]  EVALUATION DATE & TIME: 5/30/2025  7:49 AM    PCP: Edie Esqueda    ED PROVIDER: Rojelio Gongora MD      Chief Complaint   Patient presents with    Eye Problem         FINAL IMPRESSION:  1. Acute CVA (cerebrovascular accident) (H)          ED COURSE & MEDICAL DECISION MAKING:    Pertinent Labs & Imaging studies reviewed. (See chart for details)  79 year old female presents to the Emergency Department for evaluation of vision changes    ED Course as of 05/30/25 1615   Fri May 30, 2025   0751 79-year-old female with history of hypothyroidism, aortic valve stenosis, hypothyroidism presents to the emergency department for evaluation of changes to her vision in her left eye. starting on Monday patient has changes in peripheral vision to the left side.  Seems mostly isolated to left eye.  She does not think it is present out of her right eye but is not entirely certain.  Says it is worse when she is up moving around the right.  Describes it as a hogging her left sided peripheral vision.  She still able to sense movement of the left peripheral visual field but just seems foggy or like there is shadow over her left peripheral vision.  Denies any eye pain.  Had a mild headache on Monday which is since resolved no ongoing headache.  Denies any chest pain or neck pain.  No abdominal pain.  No numbness or weakness in the arms or legs.  Denies any diplopia.  No changes in speech.  Patient reports she was seen by an eye doctor and they did not have any applanation for the symptoms and her eye exam was \"normal\".    On exam here patient is well-appearing no acute distress.  Hypertensive at 196/90.  Saturating well on room air.  She is awake alert and oriented answers questions appropriately, no facial droop, full EOMs, pupils round and reactive, visual acuity 20/20 both eyes and 20/25 in the left eye.  Able " to finger count in all 4 visual quadrants out of the left eye.  Symmetric strength in upper and lower extremities, normal coordination with finger-nose.  No meningismus.  She does have a systolic heart murmur.    Given persistent symptoms and visual field changes consider possibility of central etiology of her symptoms prickly with a normal eye exam at her eye doctor 2 days ago.  Will obtain MRI to evaluate for possible CVA.  EKG to evaluate for underlying dysrhythmias.   0837 EKG shows a sinus rhythm at 71 beats a minute, normal axis, normal NV interval,  ms with right bundle branch morphology, normal QTc, no ST elevations, similar compared to prior EKG   1158 Contributed to myself.  BMP does show some hyperglycemia although patient is denying any symptoms.  Otherwise normal white count and hemoglobin.   1159 MRA MRI is notable for a small restricted diffusion area in the right supra lobe likely acute versus subacute infarct in the suspect this may be a cause of the patient's visual symptoms.  Otherwise there is multifocal intracranial stenosis particularly in the left P2 segment.    Discussed with stroke neurology.  No acute stroke interventions at this point in time outside of loading with Plavix and full dose aspirin.  Plan to admit for further stroke evaluation.       I discussed with the hospitalist service who agrees admit the patient this point in time for ongoing management.      8:00 AM I met and evaluated the patient.   11:55 AM I spoke to Stroke Neurology.   1:46 PM I spoke to Stroke Neurology.   2:02 PM I spoke to the hospitalist, Dr. Black.       Medical Decision Making  I obtained history from Family Member/Significant Other  I reviewed the EMR: Outpatient Record: Visit on 5/12/2025 at [facility] for evaluation of dizziness and bleeding/bruising.  I discussed the care with another health care provider: Stroke Neurology and Hospitalist, Dr. Black.  Admit.    MIPS (CTPE,  Dental pain, [place], Sinusitis, Asthma/COPD, Head Trauma):     SEPSIS: The patient has stroke symptoms:         ED Stroke specific documentation           NIHSS PDF     Patient last known well time: 5/26  ED Provider first to bedside at: 0915  CT Results received at: No CT, straight to MRI given duration of symptoms    Thrombolytics:   Not given due to:   - unclear or unfavorable risk-benefit profile for extended window thrombolysis beyond the conventional 4.5 hour time window    If treating with thrombolytics: Ensure SBP<180 and DBP<105 prior to treatment with thrombolytics.  Administering thrombolytics after treatment with IV labetalol, hydralazine, or nicardipine is reasonable once BP control is established.    Endovascular Retrieval:  Not initiated due to absence of proximal vessel occlusion    National Institutes of Health Stroke Scale (Baseline)  Time Performed: 0915     Score    Level of consciousness: (0)   Alert, keenly responsive    LOC questions: (0)   Answers both questions correctly    LOC commands: (0)   Performs both tasks correctly    Best gaze: (0)   Normal    Visual: (0)   No visual loss    Facial palsy: (0)   Normal symmetrical movements    Motor arm (left): (0)   No drift    Motor arm (right): (0)   No drift    Motor leg (left): (0)   No drift    Motor leg (right): (0)   No drift    Limb ataxia: (0)   Absent    Sensory: (0)   Normal- no sensory loss    Best language: (0)   Normal- no aphasia    Dysarthria: (0)   Normal    Extinction and inattention: (0)   No abnormality        Total Score:  0        Stroke Mimics were considered (including migraine headache, seizure disorder, hypoglycemia (or hyperglycemia), head or spinal trauma, CNS infection, Toxin ingestion and shock state (e.g. sepsis) .                    At the conclusion of the encounter I discussed the results of all of the tests and the disposition. The questions were answered. The patient or family acknowledged understanding and was  agreeable with the care plan.     0 minutes of critical care time     MEDICATIONS GIVEN IN THE EMERGENCY:  Medications   lidocaine 1 % 0.1-1 mL (has no administration in time range)   lidocaine (LMX4) cream (has no administration in time range)   sodium chloride (PF) 0.9% PF flush 3 mL (3 mLs Intracatheter $Given 5/30/25 9927)   sodium chloride (PF) 0.9% PF flush 3 mL (has no administration in time range)   clopidogrel (PLAVIX) tablet 75 mg (has no administration in time range)   ondansetron (ZOFRAN ODT) ODT tab 4 mg (has no administration in time range)     Or   ondansetron (ZOFRAN) injection 4 mg (has no administration in time range)   acetaminophen (TYLENOL) tablet 650 mg (has no administration in time range)     Or   acetaminophen (TYLENOL) oral liquid 650 mg (has no administration in time range)     Or   acetaminophen (TYLENOL) Suppository 650 mg (has no administration in time range)   aspirin EC tablet 81 mg (has no administration in time range)   fluorometholone (FML LIQUIFILM) 0.1 % ophthalmic susp 2 drop (has no administration in time range)   FLUoxetine (PROzac) capsule 10 mg (has no administration in time range)   levothyroxine (SYNTHROID/LEVOTHROID) tablet 75 mcg (has no administration in time range)   magnesium oxide (MAG-OX) tablet 400 mg (has no administration in time range)   polyethylene glycol (MIRALAX) Packet 17 g (has no administration in time range)   atorvastatin (LIPITOR) tablet 40 mg (has no administration in time range)   glucose gel 15-30 g (has no administration in time range)     Or   dextrose 50 % injection 25-50 mL (has no administration in time range)     Or   glucagon injection 1 mg (has no administration in time range)   insulin aspart (NovoLOG) injection (RAPID ACTING) (has no administration in time range)   insulin aspart (NovoLOG) injection (RAPID ACTING) (has no administration in time range)   methylcellulose (CITRUCEL) tablet 500 mg (has no administration in time range)  "  gadobutrol (GADAVIST) injection 10 mL (10 mLs Intravenous $Given 5/30/25 1034)   clopidogrel (PLAVIX) tablet 300 mg (300 mg Oral $Given 5/30/25 1231)   aspirin (ASA) tablet 325 mg (325 mg Oral $Given 5/30/25 1231)       NEW PRESCRIPTIONS STARTED AT TODAY'S ER VISIT  New Prescriptions    No medications on file          =================================================================    [facility]    Patient information was obtained from: Patient and her daughter    Use of : N/A         Iesha Patel is a 79 year old female with a pertinent history of Afib and endometrial cancer who presents to this ED by walk-in with her daughter for evaluation of eye problem.    Since Monday (5/26), patient has noticed a constant \"shadow\" in her left peripheral vision without any associated pain, and states this worsens with increased head movement. Patient mentions she was seen at an eye clinic without any remarkable findings. She also reports experiencing a frontal headache on Monday, which has since resolved with no recurring episodes. Due to this ongoing change in her vision, patient presents to the ED for further evaluation.     Denies any fevers, nausea, vomiting, diarrhea, or abdominal pain. No chest or neck pain. No dizziness, weakness, or double vision. No new or changed medications. History of cataract surgery. Has prescribed eyedrops.     Patient is otherwise in her normal state of health with no other concerns.     Per chart review, patient was seen on 5/12/2025 at [facility] for evaluation of dizziness and bleeding/bruising. A drop of 8 points in her systolic blood pressure was noted upon standing. EKG showed no significant changes. Lab work was obtained, view original note for results. Holter monitor was ordered. Patient was discharged home in stable condition.     REVIEW OF SYSTEMS   Refer to the [facility]    PAST MEDICAL HISTORY:  Past Medical History:   Diagnosis Date    Atrial fibrillation " (H)     single episode never repeated, two separate monitor instances    Endometrial cancer (H) 2000    Insomnia        PAST SURGICAL HISTORY:  Past Surgical History:   Procedure Laterality Date    BACK SURGERY  2/16/18    spinal stenosis with lumbar claudication and synovial cyst removal. Dr. Lencho Lindsey at [facility]    CHOLECYSTECTOMY  1991    Description: Cholecystectomy;  Recorded: 03/01/2013;  Comments: 1991 by Dr. Tucker    HYSTERECTOMY  2000    Secondary to endometrial cancer.    LUMBAR FUSION  02/2018    L4 through S1    LUMBAR SPINE SURGERY  1985    Right sciatic, lumbar laminectomy.    MOLE REMOVAL  09/13/2011    seborrheic keratosis removed from her right anterior abdominal wall    OOPHORECTOMY Bilateral 2000    TOTAL HIP ARTHROPLASTY Left 01/05/2011    Dr. Albert at     TUBAL LIGATION  1980                CURRENT MEDICATIONS:    aspirin 81 MG EC tablet  fluorometholone (FML LIQUIFILM) 0.1 % ophthalmic suspension  FLUoxetine (PROZAC) 10 MG capsule  levothyroxine (SYNTHROID/LEVOTHROID) 75 MCG tablet  Magnesium Oxide, Laxative, (LAX) 500 MG TABS tablet  methylcellulose (CITRUCEL) powder  polyethylene glycol (MIRALAX) 17 g packet        ALLERGIES:  Allergies   Allergen Reactions    Penicillins Hives     Age 14, hives in mouth. 5/30/25 patient unconcerned with prior allergy and is willing to trial again, if and when needed       FAMILY HISTORY:  Family History   Problem Relation Age of Onset    Cancer Brother         testicular, bladder, kidney, skin    Diabetes Type 2  Brother     Prostate Cancer Brother     Heart Disease Brother 59.00        heart attack     Valvular heart disease Mother     Diabetes Type 2  Mother     Cerebrovascular Disease Father     Alcoholism Daughter     Diabetes Brother     Cancer Brother     Allergies Brother        SOCIAL HISTORY:   Social History     Socioeconomic History    Marital status:     Number of children: 3   Tobacco Use    Smoking status: Never     Smokeless tobacco: Never   Vaping Use    Vaping status: Never Used   Substance and Sexual Activity    Alcohol use: No    Drug use: Not Currently    Sexual activity: Not Currently   Social History Narrative    Diet- Regular    Exercise- Now nothing, walking in past    Senior Living     twice     Social Drivers of Health     Financial Resource Strain: Low Risk  (5/1/2024)    Financial Resource Strain     Within the past 12 months, have you or your family members you live with been unable to get utilities (heat, electricity) when it was really needed?: No   Food Insecurity: Low Risk  (5/1/2024)    Food Insecurity     Within the past 12 months, did you worry that your food would run out before you got money to buy more?: No     Within the past 12 months, did the food you bought just not last and you didn t have money to get more?: No   Transportation Needs: Low Risk  (5/1/2024)    Transportation Needs     Within the past 12 months, has lack of transportation kept you from medical appointments, getting your medicines, non-medical meetings or appointments, work, or from getting things that you need?: No   Physical Activity: Unknown (5/1/2024)    Exercise Vital Sign     Days of Exercise per Week: 7 days   Stress: No Stress Concern Present (5/1/2024)    [employer] [place] [employer] - Occupational Stress Questionnaire     Feeling of Stress : Not at all   Social Connections: Unknown (5/1/2024)    Social Connection and Isolation Panel [NHANES]     Frequency of Social Gatherings with Friends and Family: Three times a week   Interpersonal Safety: Low Risk  (1/29/2025)    Interpersonal Safety     Do you feel physically and emotionally safe where you currently live?: Yes     Within the past 12 months, have you been hit, slapped, kicked or otherwise physically hurt by someone?: No     Within the past 12 months, have you been humiliated or emotionally abused in other ways by your partner or ex-partner?: No  "  Housing Stability: Low Risk  (5/1/2024)    Housing Stability     Do you have housing? : Yes     Are you worried about losing your housing?: No       VITALS:  BP (!) 172/84   Pulse 72   Temp 98.1  F (36.7  C) (Oral)   Resp 16   Ht 1.715 m (5' 7.5\")   Wt 70.8 kg (156 lb)   LMP  (LMP Unknown)   SpO2 97%   BMI 24.07 kg/m      PHYSICAL EXAM    Constitutional: Well developed, Well nourished, NAD.  HENT: Normocephalic, Atraumatic,  mucous membranes moist,   Neck- trachea midline, No stridor.    Eyes:EOMI, Conjunctiva normal, No discharge.  Respiratory: Normal breath sounds, No respiratory distress, No wheezing.   Cardiovascular: Normal heart rate, Regular rhythm, Systolic murmur.   Abdominal: Soft, No tenderness, No rebound or guarding.     Musculoskeletal: no deformity or malalignment.  Integument: Warm, Dry, No erythema.  Neurologic: Alert & oriented x 3.  Psychiatric: Affect normal, Cooperative.    National Institutes of Health Stroke Scale  Exam Interval: Baseline   Score    Level of consciousness: (0)   Alert, keenly responsive    LOC questions: (0)   Answers both questions correctly    LOC commands: (0)   Performs both tasks correctly    Best gaze: (0)   Normal    Visual: (0)   No visual loss    Facial palsy: (0)   Normal symmetrical movements    Motor arm (left): (0)   No drift    Motor arm (right): (0)   No drift    Motor leg (left): (0)   No drift    Motor leg (right): (0)   No drift    Limb ataxia: (0)   Absent    Sensory: (0)   Normal- no sensory loss    Best language: (0)   Normal- no aphasia    Dysarthria: (0)   Normal    Extinction and inattention: (0)   No abnormality        Total Score:  0        LAB:  All pertinent labs reviewed and interpreted.  Results for orders placed or performed during the hospital encounter of 05/30/25   MR Brain w/o & w Contrast    Impression    IMPRESSION:  HEAD MRI:   1.  Several small foci of restricted diffusion in the right occipital lobe likely due to acute/subacute " infarcts. No evidence of hemorrhagic transformation of infarct.  2.  Nonspecific white matter changes which are most likely due to chronic microvascular ischemic disease.  3.  Chronic left cerebellar infarct.    HEAD MRA:   1.  Multifocal intracranial stenoses. The right PCA tapers down and loses flow related enhancement in its mid and distal aspect. Severe focal stenosis of the mid left P2 segment. Severe multifocal stenosis of the ACAs. Mild stenosis of the M2 segments.  2.  These findings are nonspecific, but could potentially represent vasculopathy/vasculitis.    NECK MRA:  1.  Unremarkable MRA of the neck.   MRA Brain ([place]) wo Contrast    Impression    IMPRESSION:  HEAD MRI:   1.  Several small foci of restricted diffusion in the right occipital lobe likely due to acute/subacute infarcts. No evidence of hemorrhagic transformation of infarct.  2.  Nonspecific white matter changes which are most likely due to chronic microvascular ischemic disease.  3.  Chronic left cerebellar infarct.    HEAD MRA:   1.  Multifocal intracranial stenoses. The right PCA tapers down and loses flow related enhancement in its mid and distal aspect. Severe focal stenosis of the mid left P2 segment. Severe multifocal stenosis of the ACAs. Mild stenosis of the M2 segments.  2.  These findings are nonspecific, but could potentially represent vasculopathy/vasculitis.    NECK MRA:  1.  Unremarkable MRA of the neck.   MRA Neck (Carotids) wo & w Contrast    Impression    IMPRESSION:  HEAD MRI:   1.  Several small foci of restricted diffusion in the right occipital lobe likely due to acute/subacute infarcts. No evidence of hemorrhagic transformation of infarct.  2.  Nonspecific white matter changes which are most likely due to chronic microvascular ischemic disease.  3.  Chronic left cerebellar infarct.    HEAD MRA:   1.  Multifocal intracranial stenoses. The right PCA tapers down and loses flow related enhancement in its mid and  distal aspect. Severe focal stenosis of the mid left P2 segment. Severe multifocal stenosis of the ACAs. Mild stenosis of the M2 segments.  2.  These findings are nonspecific, but could potentially represent vasculopathy/vasculitis.    NECK MRA:  1.  Unremarkable MRA of the neck.   Basic metabolic panel   Result Value Ref Range    Sodium 136 135 - 145 mmol/L    Potassium 3.9 3.4 - 5.3 mmol/L    Chloride 99 98 - 107 mmol/L    Carbon Dioxide (CO2) 26 22 - 29 mmol/L    Anion Gap 11 7 - 15 mmol/L    Urea Nitrogen 10.3 8.0 - 23.0 mg/dL    Creatinine 0.81 0.51 - 0.95 mg/dL    GFR Estimate 74 >60 mL/min/1.73m2    Calcium 9.0 8.8 - 10.4 mg/dL    Glucose 60 (L) 70 - 99 mg/dL   CBC with platelets and differential   Result Value Ref Range    WBC Count 4.4 4.0 - 11.0 10e3/uL    RBC Count 4.58 3.80 - 5.20 10e6/uL    Hemoglobin 13.3 11.7 - 15.7 g/dL    Hematocrit 41.1 35.0 - 47.0 %    MCV 90 78 - 100 fL    MCH 29.0 26.5 - 33.0 pg    MCHC 32.4 31.5 - 36.5 g/dL    RDW 16.4 (H) 10.0 - 15.0 %    Platelet Count 218 150 - 450 10e3/uL    % Neutrophils 58 %    % Lymphocytes 24 %    % Monocytes 12 %    % Eosinophils 4 %    % Basophils 1 %    % Immature Granulocytes 0 %    NRBCs per 100 WBC 0 <1 /100    Absolute Neutrophils 2.6 1.6 - 8.3 10e3/uL    Absolute Lymphocytes 1.1 0.8 - 5.3 10e3/uL    Absolute Monocytes 0.5 0.0 - 1.3 10e3/uL    Absolute Eosinophils 0.2 0.0 - 0.7 10e3/uL    Absolute Basophils 0.1 0.0 - 0.2 10e3/uL    Absolute Immature Granulocytes 0.0 <=0.4 10e3/uL    Absolute NRBCs 0.0 10e3/uL   Extra Blue Top Tube   Result Value Ref Range    Hold Specimen JIC    Result Value Ref Range    Troponin T, High Sensitivity 11 <=14 ng/L   Hemoglobin A1c   Result Value Ref Range    Estimated Average Glucose 117 (H) <117 mg/dL    Hemoglobin A1C 5.7 (H) <5.7 %   Glucose by meter   Result Value Ref Range    GLUCOSE BY METER POCT 130 (H) 70 - 99 mg/dL   ECG 12-LEAD WITH MUSE (LHE)   Result Value Ref Range    Systolic Blood Pressure  mmHg     Diastolic Blood Pressure  mmHg    Ventricular Rate 71 BPM    Atrial Rate 71 BPM    DC Interval 174 ms    QRS Duration 130 ms     ms    QTc 452 ms    P Axis 86 degrees    R AXIS 37 degrees    T Axis 83 degrees    Interpretation ECG       Sinus rhythm  Right bundle branch block  Abnormal ECG  When compared with ECG of 12-May-2025 12:44,  No significant change was found  Confirmed by SEE ED PROVIDER NOTE FOR, ECG INTERPRETATION ([id number]),  Misbah Duncan ([id number]) on 5/30/2025 8:39:27 AM     Echocardiogram Complete - For age > 60 yrs   Result Value Ref Range    LVEF  60-65%        RADIOLOGY:  Reviewed all pertinent imaging. Please see official radiology report.  MRA Neck (Carotids) wo & w Contrast   Final Result   IMPRESSION:   HEAD MRI:    1.  Several small foci of restricted diffusion in the right occipital lobe likely due to acute/subacute infarcts. No evidence of hemorrhagic transformation of infarct.   2.  Nonspecific white matter changes which are most likely due to chronic microvascular ischemic disease.   3.  Chronic left cerebellar infarct.      HEAD MRA:    1.  Multifocal intracranial stenoses. The right PCA tapers down and loses flow related enhancement in its mid and distal aspect. Severe focal stenosis of the mid left P2 segment. Severe multifocal stenosis of the ACAs. Mild stenosis of the M2 segments.   2.  These findings are nonspecific, but could potentially represent vasculopathy/vasculitis.      NECK MRA:   1.  Unremarkable MRA of the neck.      MRA Brain ([place]) wo Contrast   Final Result   IMPRESSION:   HEAD MRI:    1.  Several small foci of restricted diffusion in the right occipital lobe likely due to acute/subacute infarcts. No evidence of hemorrhagic transformation of infarct.   2.  Nonspecific white matter changes which are most likely due to chronic microvascular ischemic disease.   3.  Chronic left cerebellar infarct.      HEAD MRA:    1.  Multifocal intracranial stenoses.  The right PCA tapers down and loses flow related enhancement in its mid and distal aspect. Severe focal stenosis of the mid left P2 segment. Severe multifocal stenosis of the ACAs. Mild stenosis of the M2 segments.   2.  These findings are nonspecific, but could potentially represent vasculopathy/vasculitis.      NECK MRA:   1.  Unremarkable MRA of the neck.      MR Brain w/o & w Contrast   Final Result   IMPRESSION:   HEAD MRI:    1.  Several small foci of restricted diffusion in the right occipital lobe likely due to acute/subacute infarcts. No evidence of hemorrhagic transformation of infarct.   2.  Nonspecific white matter changes which are most likely due to chronic microvascular ischemic disease.   3.  Chronic left cerebellar infarct.      HEAD MRA:    1.  Multifocal intracranial stenoses. The right PCA tapers down and loses flow related enhancement in its mid and distal aspect. Severe focal stenosis of the mid left P2 segment. Severe multifocal stenosis of the ACAs. Mild stenosis of the M2 segments.   2.  These findings are nonspecific, but could potentially represent vasculopathy/vasculitis.      NECK MRA:   1.  Unremarkable MRA of the neck.      Echocardiogram Complete - For age > 60 yrs   Final Result          EKG:    Performed at:     Impression: EKG shows a sinus rhythm at 71 beats a minute, normal axis, normal AR interval,  ms with right bundle branch morphology, normal QTc, no ST elevations, similar compared to prior EKG        I have independently reviewed and interpreted the EKG(s) documented above.    PROCEDURES:         [employer] Documentation:   CMS Diagnoses:              I, Edie He, am serving as a scribe to document services personally performed by Rojelio Gongora MD based on my observation and the provider's statements to me. I, Rojelio Gongora MD, attest that Edie He is acting in a scribe capacity, has observed my performance of the services and has documented them in  accordance with my direction.    Rojelio Gongora MD  [facility] EMERGENCY ROOM  [address] [id number] [phone]      Rojelio Gongora MD  05/30/25 2715

## 2025-05-30 NOTE — CONSULTS
"  [facility]    Stroke Telephone Note    I was called by Rojelio Gongora on 05/30/25 regarding patient Iesha Patel. The patient is a 79 year old female who presents with several days of peripheral vision issues on the L and intermittent dizzy spells for longer than that. On exam he does not really find a significant visual field cut fortunately.      Vitals  BP: (!) 173/83   Pulse: 70   Resp: 16   Temp: 98.2  F (36.8  C)        Imaging Findings  MRI brain shows some small areas of infarct in the R PCA territory  MRA head/neck with multifocal intracranial stenoses especially in the R PCA, mid L P2, b/l ACAs, mild stenosis of M2s    Impression  R PCA stroke probably due to ICAD but rest of workup pending    Recommendations  - Load with aspirin and Plavix now. Admit using stroke admission order set and all the usual workup  - SBP goal <180 systolic but use lowest doses of antihypertensives if needed. Try not to rapidly drop BP given the intracranial stenoses, probably best to hold home antihypertensives    Case discussed with vascular neurology attending Dr. De Luna.    My recommendations are based on the information provided over the phone by Iesha Patel's in-person providers. They are not intended to replace the clinical judgment of her in-person providers. I was not requested to personally see or examine the patient at this time.     Charlotte Bob PA-C  Vascular Neurology    To page me or covering stroke neurology team member, click here: [facility]  Choose \"On Call\" tab at top, then select \"NEUROLOGY/ALL SITES\" from middle drop-down box, press Enter, then look for \"stroke\" or \"telestroke\" for your site.    "

## 2025-05-30 NOTE — H&P
[facility]    History and Physical - Hospitalist Service       Date of Admission:  5/30/2025    Assessment & Plan      Iesha Patel is a 79 year old female admitted on 5/30/2025. She has a PMH of HTN, aortic valve stenosis, hypothyroidism, mood disorder, ongoing dizzy spells for the past couple of months presented to the ER for evaluation of left-sided peripheral vision loss since Monday.  Workup in ED showing head MRI showing several small foci of restricted diffusion in the right occipital lobe due to acute/subacute infarcts.  Head MRA showing multifocal intracranial stenosis.  Admitted for further treatment and care of acute ischemic stroke with stroke order set initiated.      Right PCA stroke probably due to ICAD   - head MRI:  Several small foci of restricted diffusion in the right occipital lobe likely due to acute/subacute infarcts. No evidence of hemorrhagic transformation of infarct. Nonspecific white matter changes which are most likely due to chronic microvascular ischemic disease.Chronic left cerebellar infarct.    Head MRA:  Multifocal intracranial stenoses. The right PCA tapers down and loses flow related enhancement in its mid and distal aspect. Severe focal stenosis of the mid left P2 segment. Severe multifocal stenosis of the ACAs. Mild stenosis of the M2 segments.  These findings are nonspecific, but could potentially represent vasculopathy/vasculitis.    - Neck MRA: Unremarkable MRA of the neck   - Received 325 mg oral ASA and 300 mg Plavix in the ER    Recommendations per stroke neurology   - Load with aspirin and Plavix now. Admit using stroke admission order set and all the usual workup  - SBP goal <180 systolic but use lowest doses of antihypertensives if needed. Try not to rapidly drop BP given the intracranial stenoses, probably best to hold home antihypertensives  - lipid panel pending, echocardiogram, telemetry, troponin x 2  - Daily baby aspirin, Plavix 75 mg  daily and statin ordered    Hypoglycemia  Prediabetes  - Glucose on arrival 60, repeat point-of-care glucose was 130 on admission  -Hemoglobin A1c ordered on admission 5.7%  - ACHS Accu-Cheks and sliding scale insulin    Uncontrolled HTN   - /84 at the time of admission  -Patient denies any history of hypertension, has never been on any antihypertensives  - Allow for permissive hypertension per stroke protocol    Moderate Aortic valve stenosis  -Echocardiogram reviewed from 6/2024 showed LVEF 55 to 60%.  Moderate aortic stenosis with mean gradient of 24 mmHg  - Repeat echocardiogram ordered per stroke protocol  - Has upcoming appointment with cardiology early June    Chronic constipation  - Resume PTA bowel regimen    Hypothyroidism  - PTA Synthroid resumed    Mood disorder   - PTA Prozac resumed          Diet: Regular Diet Adult  Regular Diet Adult    DVT Prophylaxis: Pneumatic Compression Devices  [place] Catheter: Not present  Lines: None     Cardiac Monitoring: ACTIVE order. Indication: Stroke, acute (48 hours)  Code Status: No CPR- Do NOT Intubate    Clinically Significant Risk Factors Present on Admission                 # Drug Induced Platelet Defect: home medication list includes an antiplatelet medication                        Disposition Plan     Medically Ready for Discharge: Anticipated in 2-4 Days         The patient's care was discussed with the Attending Physician, Dr. Black.    Mahi Muñiz PA-C  Hospitalist Service  [facility]  Securely message with [employer] (more info)  Text page via [employer] Paging/Directory     ______________________________________________________________________    Chief Complaint   since Monday she has had periphereal fog and is not able to focus left eye. She went to the eye doctor and they said her eyes were fine, she also has had intermittent dizzy spells for last couple months, she is to go and have a monitor placed for her heart.  "    History is obtained from the patient    History of Present Illness   Iesha Patel is a 79 year old female who has a PMH of HTN, aortic valve stenosis, hypothyroidism, mood disorder, ongoing dizzy spells for the past couple of months presented to the ER for evaluation of left-sided peripheral vision loss since Monday.  Patient reports she was seen by the eye doctor on Tuesday reportedly everything was unremarkable.  Wednesday she continued to have worsening left sided peripheral vision loss worse with movement such as driving or walking prompting her to seek emergency medical treatment. She has never experienced floaters within her vision or curtain coming down. She denies chest pain, shortness of breath, unilateral weakness, paresthesias, slurred speech, or facial droop. Does admit to feeling lightheaded for the past 3 months describing as feeling \"off centered\" when walking.  She had a fall several months ago where she describes she fell sideways.  She has seen her primary care for this lightheadedness. They had check orthostatic blood pressure and her \"BP dropped\".  She denies any tinnitus but states lightheadedness is typically worse with positional changes.Patient reports no personal history of stroke but does have significant familial history, her father passed away at the age of 60 due to recurrent strokes.  She denies smoking, alcohol use or recreational drug use.    ED course: Presented afebrile temperature 98.2, heart rate 84, blood pressure 196/90 with oxygen 99% on room air.  Labs on admission with unremarkable BMP other than glucose was 60.  CBC was unremarkable.  Head MRI showed several small foci of restricted diffusion in the right occipital lobe likely due to acute/subacute infarcts.  No evidence of hemorrhagic transformation.  Chronic left cerebellar infarct.  Head MRA shows Multifocal intracranial stenoses. The right PCA tapers down and loses flow related enhancement in its mid and distal " aspect. Severe focal stenosis of the mid left P2 segment. Severe multifocal stenosis of the ACAs. Mild stenosis of the M2 segments.  Neck MRA was unremarkable.  She received 325 mg ASA and 300 mg Plavix in the ER.    At the time of admission, patient was alert and oriented x 4 no distress on no supplemental oxygen sitting at the chair, just finished eating lunch.  Reports feeling rather well, no vision changes at this time.  Discussed treatment plan with patient and her daughter at bedside and they were agreeable.  They confirm she is DNR/DNI.      Past Medical History    Past Medical History:   Diagnosis Date    Atrial fibrillation (H)     single episode never repeated, two separate monitor instances    Endometrial cancer (H) 2000    Insomnia        Past Surgical History   Past Surgical History:   Procedure Laterality Date    BACK SURGERY  2/16/18    spinal stenosis with lumbar claudication and synovial cyst removal. Dr. Lencho Lindsey at [facility]    CHOLECYSTECTOMY  1991    Description: Cholecystectomy;  Recorded: 03/01/2013;  Comments: 1991 by Dr. Tucker    HYSTERECTOMY  2000    Secondary to endometrial cancer.    LUMBAR FUSION  02/2018    L4 through S1    LUMBAR SPINE SURGERY  1985    Right sciatic, lumbar laminectomy.    MOLE REMOVAL  09/13/2011    seborrheic keratosis removed from her right anterior abdominal wall    OOPHORECTOMY Bilateral 2000    TOTAL HIP ARTHROPLASTY Left 01/05/2011    Dr. Albert at     TUBAL LIGATION  1980            Prior to Admission Medications   Prior to Admission Medications   Prescriptions Last Dose Informant Patient Reported? Taking?   FLUoxetine (PROZAC) 10 MG capsule 5/30/2025  No Yes   Sig: Take 1 capsule (10 mg) by mouth daily.   Magnesium Oxide, Laxative, (LAX) 500 MG TABS tablet 5/30/2025  Yes Yes   Sig: Take 400 mg by mouth daily. Milk of mag tablet for constipation   aspirin 81 MG EC tablet 5/29/2025  Yes Yes   Sig: Take 81 mg by mouth daily.   fluorometholone (FML  LIQUIFILM) 0.1 % ophthalmic suspension 5/30/2025  Yes Yes   Sig: Place 2 drops into both eyes daily   levothyroxine (SYNTHROID/LEVOTHROID) 75 MCG tablet 5/30/2025  No Yes   Sig: Take 1 tablet (75 mcg) by mouth daily.   methylcellulose (CITRUCEL) powder 5/30/2025  Yes Yes   Sig: Take 1 Tablespoonful by mouth daily   polyethylene glycol (MIRALAX) 17 g packet 5/30/2025  Yes Yes   Sig: Take 1 packet by mouth daily.      Facility-Administered Medications: None        Review of Systems    Review of Systems   Constitutional:  Negative for chills, diaphoresis, fever, malaise/fatigue and weight loss.   Eyes:         Left eye peripheral vision loss   Respiratory:  Negative for cough, shortness of breath and wheezing.    Cardiovascular:  Negative for chest pain, palpitations, orthopnea and leg swelling.   Gastrointestinal:  Negative for abdominal pain, heartburn, nausea and vomiting.   Genitourinary:  Negative for flank pain, frequency and urgency.   Musculoskeletal:  Negative for falls, myalgias and neck pain.   Skin:  Negative for itching and rash.   Neurological:  Positive for dizziness (Off-and-on, worse with positional changes). Negative for tingling, seizures and weakness.   Psychiatric/Behavioral:  Negative for depression.          Physical Exam   Vital Signs: Temp: 98.2  F (36.8  C) Temp src: Oral BP: (!) 180/84 Pulse: 81   Resp: 16 SpO2: 96 % O2 Device: None (Room air)    Weight: 0 lbs 0 oz    Physical Exam  Constitutional:       General: She is not in acute distress.     Appearance: She is not ill-appearing, toxic-appearing or diaphoretic.   HENT:      Head: Normocephalic and atraumatic.      Mouth/Throat:      Mouth: Mucous membranes are moist.   Cardiovascular:      Rate and Rhythm: Normal rate and regular rhythm.      Heart sounds: Murmur heard.      Systolic murmur is present.      No friction rub.   Pulmonary:      Effort: No respiratory distress.      Breath sounds: No wheezing or rales.   Abdominal:       General: Bowel sounds are normal.      Palpations: Abdomen is soft.      Tenderness: There is no abdominal tenderness. There is no guarding.   Neurological:      Mental Status: She is alert and oriented to person, place, and time.      Comments: No slurred speech, no facial droop, 5/5  bilaterally, sensation intact bilateral upper and lower extremities.  5/5 strength against resistance of bilateral upper and lower extremities.   Psychiatric:         Mood and Affect: Mood normal.         Behavior: Behavior normal.           Medical Decision Making       75 MINUTES SPENT BY ME on the date of service doing chart review, history, exam, documentation & further activities per the note.      Data     I have personally reviewed the following data over the past 24 hrs:    4.4  \   13.3   / 218     136 99 10.3 /  130 (H)   3.9 26 0.81 \       Imaging results reviewed over the past 24 hrs:   Recent Results (from the past 24 hours)   MR Brain w/o & w Contrast    Narrative    EXAM: MR BRAIN W/O and W CONTRAST, MRA BRAIN ([place]) W/O CONTRAST, MRA NECK (CAROT[place]) W/O and W CONTRAST  LOCATION: [facility]  DATE: 5/30/2025    INDICATION: Left-sided visual field changes x 3 days.  COMPARISON: Head CT 8/17/2023.  CONTRAST: 10mL Alex (accession [id number]), 10mL Alex (accession [id number]), 10ml Gadavist (accession [id number]).  TECHNIQUE:   1) Routine multiplanar multisequence head MRI without and with intravenous contrast.  2) 3D time-of-flight head MRA without intravenous contrast.  3) Neck MRA without and with IV contrast. Stenosis measurements made according to NASCET criteria unless otherwise specified.    FINDINGS:  HEAD MRI:  INTRACRANIAL CONTENTS: Several small foci of restricted diffusion in the right occipital lobe seen on both the axial and coronal diffusion-weighted sequences concerning for acute/subacute infarcts. These appear to be associated with mild T2   hyperintensity. No  significant mass effect or midline shift. No acute intracranial hemorrhage. Patchy deep and subcortical white matter T2 hyperintensities are nonspecific, but most likely related to chronic microvascular ischemic disease. Ventricular   size is within normal limits without evidence of hydrocephalus. Chronic infarct in the left cerebellum. No abnormal intracranial enhancement.    SELLA: No abnormality accounting for technique.    OSSEOUS STRUCTURES/SOFT TISSUES: Normal marrow signal.      ORBITS: No abnormality accounting for technique.     SINUSES/MASTOIDS: Mild mucosal thickening in the right maxillary sinus. No middle ear or mastoid effusion.     HEAD MRA:   Dominant left and smaller right vertebral arteries contribute to the basilar artery. Severe focal stenosis of the mid left P2 segment with the distal left PCA small in caliber but patent. The right P2 segment tapers and essentially loses flow related   enhancement in its mid and distal aspect with poor visualization of the distal PCA. Severe multifocal stenoses of bilateral A2/A3 segments. There appears to be mild stenoses of the M2 branches bilaterally.    NECK MRA:   RIGHT CAROTID: No measurable stenosis or dissection.    LEFT CAROTID: No measurable stenosis or dissection.    VERTEBRAL ARTERIES: No focal stenosis or dissection. Dominant left and smaller right vertebral arteries.    AORTIC ARCH: Classic aortic arch anatomy with no significant stenosis at the origin of the great vessels.      Impression    IMPRESSION:  HEAD MRI:   1.  Several small foci of restricted diffusion in the right occipital lobe likely due to acute/subacute infarcts. No evidence of hemorrhagic transformation of infarct.  2.  Nonspecific white matter changes which are most likely due to chronic microvascular ischemic disease.  3.  Chronic left cerebellar infarct.    HEAD MRA:   1.  Multifocal intracranial stenoses. The right PCA tapers down and loses flow related enhancement in its mid  and distal aspect. Severe focal stenosis of the mid left P2 segment. Severe multifocal stenosis of the ACAs. Mild stenosis of the M2 segments.  2.  These findings are nonspecific, but could potentially represent vasculopathy/vasculitis.    NECK MRA:  1.  Unremarkable MRA of the neck.   MRA Brain ([place]) wo Contrast    Narrative    EXAM: MR BRAIN W/O and W CONTRAST, MRA BRAIN ([place]) W/O CONTRAST, MRA NECK (CAROTIDS) W/O and W CONTRAST  LOCATION: [facility]  DATE: 5/30/2025    INDICATION: Left-sided visual field changes x 3 days.  COMPARISON: Head CT 8/17/2023.  CONTRAST: 10mL Alex (accession [id number]), 10mL Alex (accession [id number]), 10ml Gadavist (accession [id number]).  TECHNIQUE:   1) Routine multiplanar multisequence head MRI without and with intravenous contrast.  2) 3D time-of-flight head MRA without intravenous contrast.  3) Neck MRA without and with IV contrast. Stenosis measurements made according to NASCET criteria unless otherwise specified.    FINDINGS:  HEAD MRI:  INTRACRANIAL CONTENTS: Several small foci of restricted diffusion in the right occipital lobe seen on both the axial and coronal diffusion-weighted sequences concerning for acute/subacute infarcts. These appear to be associated with mild T2   hyperintensity. No significant mass effect or midline shift. No acute intracranial hemorrhage. Patchy deep and subcortical white matter T2 hyperintensities are nonspecific, but most likely related to chronic microvascular ischemic disease. Ventricular   size is within normal limits without evidence of hydrocephalus. Chronic infarct in the left cerebellum. No abnormal intracranial enhancement.    SELLA: No abnormality accounting for technique.    OSSEOUS STRUCTURES/SOFT TISSUES: Normal marrow signal.      ORBITS: No abnormality accounting for technique.     SINUSES/MASTOIDS: Mild mucosal thickening in the right maxillary sinus. No middle ear or mastoid  effusion.     HEAD MRA:   Dominant left and smaller right vertebral arteries contribute to the basilar artery. Severe focal stenosis of the mid left P2 segment with the distal left PCA small in caliber but patent. The right P2 segment tapers and essentially loses flow related   enhancement in its mid and distal aspect with poor visualization of the distal PCA. Severe multifocal stenoses of bilateral A2/A3 segments. There appears to be mild stenoses of the M2 branches bilaterally.    NECK MRA:   RIGHT CAROTID: No measurable stenosis or dissection.    LEFT CAROTID: No measurable stenosis or dissection.    VERTEBRAL ARTERIES: No focal stenosis or dissection. Dominant left and smaller right vertebral arteries.    AORTIC ARCH: Classic aortic arch anatomy with no significant stenosis at the origin of the great vessels.      Impression    IMPRESSION:  HEAD MRI:   1.  Several small foci of restricted diffusion in the right occipital lobe likely due to acute/subacute infarcts. No evidence of hemorrhagic transformation of infarct.  2.  Nonspecific white matter changes which are most likely due to chronic microvascular ischemic disease.  3.  Chronic left cerebellar infarct.    HEAD MRA:   1.  Multifocal intracranial stenoses. The right PCA tapers down and loses flow related enhancement in its mid and distal aspect. Severe focal stenosis of the mid left P2 segment. Severe multifocal stenosis of the ACAs. Mild stenosis of the M2 segments.  2.  These findings are nonspecific, but could potentially represent vasculopathy/vasculitis.    NECK MRA:  1.  Unremarkable MRA of the neck.   MRA Neck ([place]) wo & w Contrast    Narrative    EXAM: MR BRAIN W/O and W CONTRAST, MRA BRAIN ([place]) W/O CONTRAST, MRA NECK (CAROT[place]) W/O and W CONTRAST  LOCATION: [facility]  DATE: 5/30/2025    INDICATION: Left-sided visual field changes x 3 days.  COMPARISON: Head CT 8/17/2023.  CONTRAST: 10mL Alex (accession  [id number]), 10mL Alex (accession [id number]), 10ml Gadavist (accession [id number]).  TECHNIQUE:   1) Routine multiplanar multisequence head MRI without and with intravenous contrast.  2) 3D time-of-flight head MRA without intravenous contrast.  3) Neck MRA without and with IV contrast. Stenosis measurements made according to NASCET criteria unless otherwise specified.    FINDINGS:  HEAD MRI:  INTRACRANIAL CONTENTS: Several small foci of restricted diffusion in the right occipital lobe seen on both the axial and coronal diffusion-weighted sequences concerning for acute/subacute infarcts. These appear to be associated with mild T2   hyperintensity. No significant mass effect or midline shift. No acute intracranial hemorrhage. Patchy deep and subcortical white matter T2 hyperintensities are nonspecific, but most likely related to chronic microvascular ischemic disease. Ventricular   size is within normal limits without evidence of hydrocephalus. Chronic infarct in the left cerebellum. No abnormal intracranial enhancement.    SELLA: No abnormality accounting for technique.    OSSEOUS STRUCTURES/SOFT TISSUES: Normal marrow signal.      ORBITS: No abnormality accounting for technique.     SINUSES/MASTOIDS: Mild mucosal thickening in the right maxillary sinus. No middle ear or mastoid effusion.     HEAD MRA:   Dominant left and smaller right vertebral arteries contribute to the basilar artery. Severe focal stenosis of the mid left P2 segment with the distal left PCA small in caliber but patent. The right P2 segment tapers and essentially loses flow related   enhancement in its mid and distal aspect with poor visualization of the distal PCA. Severe multifocal stenoses of bilateral A2/A3 segments. There appears to be mild stenoses of the M2 branches bilaterally.    NECK MRA:   RIGHT CAROTID: No measurable stenosis or dissection.    LEFT CAROTID: No measurable stenosis or dissection.    VERTEBRAL ARTERIES: No focal  stenosis or dissection. Dominant left and smaller right vertebral arteries.    AORTIC ARCH: Classic aortic arch anatomy with no significant stenosis at the origin of the great vessels.      Impression    IMPRESSION:  HEAD MRI:   1.  Several small foci of restricted diffusion in the right occipital lobe likely due to acute/subacute infarcts. No evidence of hemorrhagic transformation of infarct.  2.  Nonspecific white matter changes which are most likely due to chronic microvascular ischemic disease.  3.  Chronic left cerebellar infarct.    HEAD MRA:   1.  Multifocal intracranial stenoses. The right PCA tapers down and loses flow related enhancement in its mid and distal aspect. Severe focal stenosis of the mid left P2 segment. Severe multifocal stenosis of the ACAs. Mild stenosis of the M2 segments.  2.  These findings are nonspecific, but could potentially represent vasculopathy/vasculitis.    NECK MRA:  1.  Unremarkable MRA of the neck.

## 2025-05-30 NOTE — ED TRIAGE NOTES
Patient states since Monday she has had periphereal fog and is not able to focus left eye. She went to the eye doctor and they said her eyes were fine, she also has had intermittent dizzy spells for last couple months, she is to go and have a monitor placed for her heart.      Triage Assessment (Adult)       Row Name 05/30/25 0756          Triage Assessment    Airway WDL WDL        Respiratory WDL    Respiratory WDL WDL        Skin Circulation/Temperature WDL    Skin Circulation/Temperature WDL WDL        Cardiac WDL    Cardiac WDL WDL        Peripheral/Neurovascular WDL    Peripheral Neurovascular WDL WDL        Cognitive/Neuro/Behavioral WDL    Cognitive/Neuro/Behavioral WDL WDL

## 2025-05-31 ENCOUNTER — APPOINTMENT (OUTPATIENT)
Dept: OCCUPATIONAL THERAPY | Facility: CLINIC | Age: 80
End: 2025-05-31
Payer: COMMERCIAL

## 2025-05-31 ENCOUNTER — APPOINTMENT (OUTPATIENT)
Dept: PHYSICAL THERAPY | Facility: CLINIC | Age: 80
End: 2025-05-31
Payer: COMMERCIAL

## 2025-05-31 VITALS
BODY MASS INDEX: 23.64 KG/M2 | RESPIRATION RATE: 20 BRPM | TEMPERATURE: 99.3 F | DIASTOLIC BLOOD PRESSURE: 70 MMHG | WEIGHT: 156 LBS | HEIGHT: 68 IN | SYSTOLIC BLOOD PRESSURE: 137 MMHG | OXYGEN SATURATION: 97 % | HEART RATE: 106 BPM

## 2025-05-31 LAB
ANION GAP SERPL CALCULATED.3IONS-SCNC: 11 MMOL/L (ref 7–15)
BUN SERPL-MCNC: 10.8 MG/DL (ref 8–23)
CALCIUM SERPL-MCNC: 9 MG/DL (ref 8.8–10.4)
CHLORIDE SERPL-SCNC: 101 MMOL/L (ref 98–107)
CHOLEST SERPL-MCNC: 189 MG/DL
CREAT SERPL-MCNC: 0.75 MG/DL (ref 0.51–0.95)
EGFRCR SERPLBLD CKD-EPI 2021: 82 ML/MIN/1.73M2
ERYTHROCYTE [DISTWIDTH] IN BLOOD BY AUTOMATED COUNT: 16.2 % (ref 10–15)
GLUCOSE BLDC GLUCOMTR-MCNC: 136 MG/DL (ref 70–99)
GLUCOSE BLDC GLUCOMTR-MCNC: 93 MG/DL (ref 70–99)
GLUCOSE BLDC GLUCOMTR-MCNC: 99 MG/DL (ref 70–99)
GLUCOSE SERPL-MCNC: 99 MG/DL (ref 70–99)
HCO3 SERPL-SCNC: 24 MMOL/L (ref 22–29)
HCT VFR BLD AUTO: 42.3 % (ref 35–47)
HDLC SERPL-MCNC: 94 MG/DL
HGB BLD-MCNC: 14.2 G/DL (ref 11.7–15.7)
LDLC SERPL CALC-MCNC: 85 MG/DL
MCH RBC QN AUTO: 29.2 PG (ref 26.5–33)
MCHC RBC AUTO-ENTMCNC: 33.6 G/DL (ref 31.5–36.5)
MCV RBC AUTO: 87 FL (ref 78–100)
NONHDLC SERPL-MCNC: 95 MG/DL
PLATELET # BLD AUTO: 231 10E3/UL (ref 150–450)
POTASSIUM SERPL-SCNC: 4.1 MMOL/L (ref 3.4–5.3)
RBC # BLD AUTO: 4.87 10E6/UL (ref 3.8–5.2)
SODIUM SERPL-SCNC: 136 MMOL/L (ref 135–145)
TRIGL SERPL-MCNC: 49 MG/DL
WBC # BLD AUTO: 5.4 10E3/UL (ref 4–11)

## 2025-05-31 PROCEDURE — 85041 AUTOMATED RBC COUNT: CPT

## 2025-05-31 PROCEDURE — 80061 LIPID PANEL: CPT

## 2025-05-31 PROCEDURE — 99239 HOSP IP/OBS DSCHRG MGMT >30: CPT | Performed by: HOSPITALIST

## 2025-05-31 PROCEDURE — 82565 ASSAY OF CREATININE: CPT

## 2025-05-31 PROCEDURE — G0426 INPT/ED TELECONSULT50: HCPCS | Mod: G0

## 2025-05-31 PROCEDURE — 36415 COLL VENOUS BLD VENIPUNCTURE: CPT

## 2025-05-31 PROCEDURE — 97162 PT EVAL MOD COMPLEX 30 MIN: CPT | Mod: GP

## 2025-05-31 PROCEDURE — 84132 ASSAY OF SERUM POTASSIUM: CPT

## 2025-05-31 PROCEDURE — 97166 OT EVAL MOD COMPLEX 45 MIN: CPT | Mod: GO

## 2025-05-31 PROCEDURE — 250N000013 HC RX MED GY IP 250 OP 250 PS 637: Performed by: HOSPITALIST

## 2025-05-31 PROCEDURE — 97116 GAIT TRAINING THERAPY: CPT | Mod: GP

## 2025-05-31 PROCEDURE — 999N000226 HC STATISTIC SLP IP EVAL DEFER

## 2025-05-31 PROCEDURE — 97535 SELF CARE MNGMENT TRAINING: CPT | Mod: GO

## 2025-05-31 PROCEDURE — 85018 HEMOGLOBIN: CPT

## 2025-05-31 PROCEDURE — 250N000013 HC RX MED GY IP 250 OP 250 PS 637

## 2025-05-31 RX ORDER — SENNOSIDES 8.6 MG
325 CAPSULE ORAL DAILY
Qty: 90 TABLET | Refills: 3 | Status: SHIPPED | OUTPATIENT
Start: 2025-06-01

## 2025-05-31 RX ORDER — CLOPIDOGREL BISULFATE 75 MG/1
75 TABLET ORAL DAILY
Status: DISCONTINUED | OUTPATIENT
Start: 2025-06-01 | End: 2025-05-31 | Stop reason: HOSPADM

## 2025-05-31 RX ORDER — ATORVASTATIN CALCIUM 40 MG/1
40 TABLET, FILM COATED ORAL EVERY EVENING
Qty: 30 TABLET | Refills: 0 | Status: SHIPPED | OUTPATIENT
Start: 2025-05-31

## 2025-05-31 RX ORDER — CLOPIDOGREL BISULFATE 75 MG/1
75 TABLET ORAL DAILY
Qty: 90 TABLET | Refills: 0 | Status: SHIPPED | OUTPATIENT
Start: 2025-06-01 | End: 2025-06-03

## 2025-05-31 RX ORDER — SENNOSIDES 8.6 MG
325 CAPSULE ORAL DAILY
Status: DISCONTINUED | OUTPATIENT
Start: 2025-06-01 | End: 2025-05-31 | Stop reason: HOSPADM

## 2025-05-31 RX ADMIN — LEVOTHYROXINE SODIUM 75 MCG: 0.07 TABLET ORAL at 07:59

## 2025-05-31 RX ADMIN — POLYETHYLENE GLYCOL 3350 17 G: 17 POWDER, FOR SOLUTION ORAL at 08:00

## 2025-05-31 RX ADMIN — CLOPIDOGREL BISULFATE 75 MG: 75 TABLET, FILM COATED ORAL at 07:59

## 2025-05-31 RX ADMIN — ASPIRIN 81 MG: 81 TABLET, COATED ORAL at 07:59

## 2025-05-31 RX ADMIN — FLUOXETINE 10 MG: 10 CAPSULE ORAL at 07:59

## 2025-05-31 RX ADMIN — FLUOROMETHOLONE 2 DROP: 1 SOLUTION/ DROPS OPHTHALMIC at 08:00

## 2025-05-31 RX ADMIN — METHYLCELLULOSE 500 MG: 500 TABLET ORAL at 07:59

## 2025-05-31 RX ADMIN — Medication 400 MG: at 07:59

## 2025-05-31 ASSESSMENT — ACTIVITIES OF DAILY LIVING (ADL)
ADLS_ACUITY_SCORE: 28
ADLS_ACUITY_SCORE: 30
ADLS_ACUITY_SCORE: 28
ADLS_ACUITY_SCORE: 30
ADLS_ACUITY_SCORE: 28
ADLS_ACUITY_SCORE: 28

## 2025-05-31 NOTE — PLAN OF CARE
VSS on RA; SR on tele. Cleared for discharge with OP follow up per stroke neuro and home PT. Discharge teaching provided; questions asked and answered to pt satisfaction. Wheeled to main entrance by NA and transported home by daughter. Confirmed rx can be picked up at [email or web address] today. Confirmed all belongings accounted for.

## 2025-05-31 NOTE — PROGRESS NOTES
Care Management Discharge Note    Discharge Date: 05/31/2025     Discharge Disposition: Home    Discharge Services: Home Care (referral pending)    Discharge DME: None    Discharge Transportation: family or friend will provide    Education Provided on the Discharge Plan: Yes (AVS per bedside RN)    Persons Notified of Discharge Plans: patient    Patient/Family in Agreement with the Plan: yes         Additional Information:  CM reviewed chart. MD placed discharge orders. Patient was discharge home per bedside RN. Home care referral pending. Family to provide transportation home at discharge.       Referral pending  [place] [id number]       PT recommendations: home with assist;home with outpatient physical therapy   OT recommendations: [facility]-Mot[facility] patient will benefit from intensive, interdisciplinary therapy. Anticipate will be able to tolerate 3 hours of therapy per day           Monica Crum RN  Care Coordinator

## 2025-05-31 NOTE — DISCHARGE SUMMARY
[facility] MEDICINE  DISCHARGE SUMMARY     Primary Care Physician: Edie Esqueda  Admission Date: 5/30/2025   Discharge Provider: González Black DO Discharge Date: 5/31/2025   Diet:   Active Diet and Nourishment Order   Procedures    Regular Diet Adult    Regular Diet Adult    Diet       Code Status: No CPR- Do NOT Intubate   Activity: per therapy recs        Condition at Discharge: Stable     REASON FOR PRESENTATION(See Admission Note for Details)   Vision changes, balance trouble  PRINCIPAL & ACTIVE DISCHARGE DIAGNOSES   Acute CVA  Moderate aortic stenosis  PENDING LABS     Unresulted Labs Ordered in the Past 30 Days of this Admission       Date and Time Order Name Status Description    5/31/2025 12:00 AM Lipid panel reflex to direct LDL: Non-fasting In process           PROCEDURES ( this hospitalization only)      RECOMMENDATIONS TO OUTPATIENT PROVIDER FOR F/U VISIT     Follow-up Appointments       Hospital Follow-up with Existing Primary Care Provider (PCP)          Schedule Primary Care visit within: 7 Days             Make sure she has follow up with neuro, sleep medicine  Follow bp, continue to optimize risk reduction  DISPOSITION   Home with home care  SUMMARY OF HOSPITAL COURSE:    Iesha Patel is a 79 year old female admitted on 5/30/2025. She has a PMH of HTN, aortic valve stenosis, hypothyroidism, mood disorder, ongoing dizzy spells for the past couple of months presented to the ER for evaluation of left-sided peripheral vision loss since Monday.  Workup in ED showing head MRI showing several small foci of restricted diffusion in the right occipital lobe due to acute/subacute infarcts.  Head MRA showing multifocal intracranial stenosis.  Admitted for further treatment and care of acute ischemic stroke with stroke order set initiated.  Echo  with normal EF, moderate aortic stenosis noted, progressed since 1 year ago. Followed by stroke neuro, started ,  plavix, statin. Sleep study recommended, home PT/OT ordered. 14 day mobile cardiac monitor ordered.  Discharge Medications with Med changes:     Current Discharge Medication List        START taking these medications    Details   atorvastatin (LIPITOR) 40 MG tablet Take 1 tablet (40 mg) by mouth every evening.  Qty: 30 tablet, Refills: 0    Associated Diagnoses: Acute CVA (cerebrovascular accident) (H)      clopidogrel (PLAVIX) 75 MG tablet Take 1 tablet (75 mg) by mouth or NG Tube daily.  Qty: 90 tablet, Refills: 0    Associated Diagnoses: Acute CVA (cerebrovascular accident) (H)           CONTINUE these medications which have CHANGED    Details   aspirin (ASA) 325 MG EC tablet Take 1 tablet (325 mg) by mouth daily.  Qty: 90 tablet, Refills: 3    Associated Diagnoses: Acute CVA (cerebrovascular accident) (H)           CONTINUE these medications which have NOT CHANGED    Details   fluorometholone (FML LIQUIFILM) 0.1 % ophthalmic suspension Place 2 drops into both eyes daily      FLUoxetine (PROZAC) 10 MG capsule Take 1 capsule (10 mg) by mouth daily.  Qty: 90 capsule, Refills: 4    Associated Diagnoses: Dysthymia      levothyroxine (SYNTHROID/LEVOTHROID) 75 MCG tablet Take 1 tablet (75 mcg) by mouth daily.  Qty: 90 tablet, Refills: 4    Associated Diagnoses: Hypothyroidism, unspecified type      MAGNESIUM CITRATE PO Take 500 mg by mouth daily.      Magnesium Oxide, Laxative, (LAX) 500 MG TABS tablet Take 400 mg by mouth daily. Milk of mag tablet for constipation      methylcellulose (CITRUCEL) powder Take 1 Tablespoonful by mouth daily      polyethylene glycol (MIRALAX) 17 g packet Take 1 packet by mouth daily.           Rationale for medication changes:    As above  Consults   Neurology  Anticoagulation Information    Started plavix, increased asa  SIGNIFICANT IMAGING FINDINGS     Results for orders placed or performed during the hospital encounter of 05/30/25   MR Brain w/o & w Contrast    Impression     IMPRESSION:  HEAD MRI:   1.  Several small foci of restricted diffusion in the right occipital lobe likely due to acute/subacute infarcts. No evidence of hemorrhagic transformation of infarct.  2.  Nonspecific white matter changes which are most likely due to chronic microvascular ischemic disease.  3.  Chronic left cerebellar infarct.    HEAD MRA:   1.  Multifocal intracranial stenoses. The right PCA tapers down and loses flow related enhancement in its mid and distal aspect. Severe focal stenosis of the mid left P2 segment. Severe multifocal stenosis of the ACAs. Mild stenosis of the M2 segments.  2.  These findings are nonspecific, but could potentially represent vasculopathy/vasculitis.    NECK MRA:  1.  Unremarkable MRA of the neck.   MRA Brain ([place]) wo Contrast    Impression    IMPRESSION:  HEAD MRI:   1.  Several small foci of restricted diffusion in the right occipital lobe likely due to acute/subacute infarcts. No evidence of hemorrhagic transformation of infarct.  2.  Nonspecific white matter changes which are most likely due to chronic microvascular ischemic disease.  3.  Chronic left cerebellar infarct.    HEAD MRA:   1.  Multifocal intracranial stenoses. The right PCA tapers down and loses flow related enhancement in its mid and distal aspect. Severe focal stenosis of the mid left P2 segment. Severe multifocal stenosis of the ACAs. Mild stenosis of the M2 segments.  2.  These findings are nonspecific, but could potentially represent vasculopathy/vasculitis.    NECK MRA:  1.  Unremarkable MRA of the neck.   MRA Neck ([place]) wo & w Contrast    Impression    IMPRESSION:  HEAD MRI:   1.  Several small foci of restricted diffusion in the right occipital lobe likely due to acute/subacute infarcts. No evidence of hemorrhagic transformation of infarct.  2.  Nonspecific white matter changes which are most likely due to chronic microvascular ischemic disease.  3.  Chronic left cerebellar  infarct.    HEAD MRA:   1.  Multifocal intracranial stenoses. The right PCA tapers down and loses flow related enhancement in its mid and distal aspect. Severe focal stenosis of the mid left P2 segment. Severe multifocal stenosis of the ACAs. Mild stenosis of the M2 segments.  2.  These findings are nonspecific, but could potentially represent vasculopathy/vasculitis.    NECK MRA:  1.  Unremarkable MRA of the neck.   Echocardiogram Complete - For age > 60 yrs   Result Value Ref Range    LVEF  60-65%      SIGNIFICANT LABORATORY FINDINGS   See EMR  Discharge Orders        Primary Care - Care Coordination Referral      Adult Sleep Eval & Management  Referral      Home Care Referral      Reason for your hospital stay    stroke     Activity    Your activity upon discharge: activity as tolerated, no driving until further testing, improvement in symptoms. Walk with walker, have someone with you at all times.     Diet    Follow this diet upon discharge: Current Diet:Orders Placed This Encounter      Try to follow the mediterranean diet     Stroke Hospital Follow Up (for neurologist use only)    Please be aware that coverage of these services is subject to the terms and limitations of your health insurance plan.  Call member services at your health plan with any benefit or coverage questions.  [facility] will call you to coordinate care as prescribed by your provider. If you don t hear from a representative within 2 business days, please call [phone].    [employer] will call you to coordinate care as prescribed by your provider. If you don t hear from a representative within 2 business days, please call [phone].       Hospital Follow-up with Existing Primary Care Provider (PCP)          ZIO PATCH MAIL OUT     Examination   Physical Exam   Temp:  [97.8  F (36.6  C)-99.3  F (37.4  C)] 99.3  F (37.4  C)  Pulse:  [] 106  Resp:  [16-20] 20  BP: (112-197)/(63-89) 137/70  SpO2:  [96 %-97 %]  97 %  Wt Readings from Last 1 Encounters:   05/30/25 70.8 kg (156 lb)           Please see EMR for more detailed significant labs, imaging, consultant notes etc.    IGonzález DO, personally saw the patient today and spent greater than 30 minutes discharging this patient.    González Black DO  [facility]    CC:Edie Esqueda

## 2025-05-31 NOTE — PLAN OF CARE
Problem: Adult Inpatient Plan of Care  Goal: Optimal Comfort and Wellbeing  Outcome: Progressing     Problem: Delirium  Goal: Improved Sleep  Outcome: Progressing     Goal Outcome Evaluation:  Pt is alert and oriented, calls appropriately for needs. Stand by assist with ambulation. Vitals signs are stable, afebrile, oxygen WNL on room air. Pt is tolerating diet. IV is SL. Glucose checks ACHS, insulin coverage per sliding scale. Pt is voiding appropriately. Pt denies pain.     Plan: Will continue to monitor.    Raymond Wong RN  May 30, 2025, 10:56 PM

## 2025-05-31 NOTE — PLAN OF CARE
Speech Pathology    Order received for IP Speech Therapy consult. Chart reviewed and case discussed with care team. Patient reportedly passed nursing dysphagia screen and has been tolerating a diet of Regular food textures and thin liquids without s/sx of aspiration. SLP met with patient in her room. She was sitting up in the chair, eating lunch at the time. Patient's speech is clear (i.e., no dysarthria noted) and her expressive and receptive language skills appear intact. She denies any coughing or choking with PO intake. Patient states that her only deficit is her left visual disturbance. Will defer clinical swallow evaluation & speech/language evaluation and complete current order at this time. Please re-consult Speech Therapy if new concerns arise.

## 2025-05-31 NOTE — PLAN OF CARE
Pt AxOx4 and able to make needs known. No acute changes overnight. Denies pain. Pt up with SBA. VSS on RA. SR with a BBB on tele. Call light within reach and purposeful rounding performed.       Problem: Adult Inpatient Plan of Care  Goal: Optimal Comfort and Wellbeing  Outcome: Progressing     Problem: Delirium  Goal: Improved Sleep  Outcome: Progressing

## 2025-05-31 NOTE — PROGRESS NOTES
05/31/25 1137   Appointment Info   Signing Clinician's Name / Credentials (OT) Demetra Beavers, OTR/L   Rehab Comments (OT) OT Eval   Living Environment   People in Home alone   Current Living Arrangements apartment   Home Accessibility no concerns   Living Environment Comments Does not need to stairs, far distance to mail, laundry   Self-Care   Usual Activity Tolerance good   Current Activity Tolerance good   Regular Exercise   (yes)   Fall history within last six months yes   Number of times patient has fallen within last six months 1  (multiple light headedness since, ortho static BP)   General Information   Onset of Illness/Injury or Date of Surgery 05/31/25   Referring Physician González Black, DO   Patient/Family Therapy Goal Statement (OT) I want to get back to driving.   Additional Occupational Profile Info/Pertinent History of Current Problem Per chart- 79 year old female admitted on 5/30/2025. She has a PMH of HTN, aortic valve stenosis, hypothyroidism, mood disorder, ongoing dizzy spells for the past couple of months presented to the ER for evaluation of left-sided peripheral vision loss since Monday.  Workup in ED showing head MRI showing several small foci of restricted diffusion in the right occipital lobe due to acute/subacute infarcts.  Head MRA showing multifocal intracranial stenosis.   Limitations/Impairments visual   Cognitive Status Examination   Orientation Status orientation to person, place and time   Follows Commands WFL   Visual Perception   Visual Impairment/Limitations corrective lenses full-time   Impact of Vision Impairment on Function (Vision) L peripheral eye loss, demostrated appropriate visual tracking/smooth horizontal pursuits and saccades.   Pain Assessment   Patient Currently in Pain No   Posture   Posture not impaired   Range of Motion Comprehensive   General Range of Motion bilateral upper extremity ROM WFL   Strength Comprehensive (MMT)   Comment, General Manual Muscle  Testing (MMT) Assessment WFL-NT elbow flex R UE due to IV   Bed Mobility   Bed Mobility supine-sit;sit-supine   Supine-Sit [place] (Bed Mobility) contact guard   Sit-Supine [place] (Bed Mobility) contact guard   Transfers   Transfers bed-chair transfer;sit-stand transfer;toilet transfer   Transfer Skill: Bed to Chair/Chair to Bed   Bed-Chair [place] (Transfers) verbal cues;contact guard   Sit-Stand Transfer   Sit-Stand [place] (Transfers) verbal cues;contact guard   Toilet Transfer   Type (Toilet Transfer) sit-stand;stand-sit   [place] Level (Toilet Transfer) contact guard   Balance   Balance Assessment standing dynamic balance   Standing Balance: Dynamic contact guard   Activities of Daily Living   BADL Assessment/Intervention lower body dressing;toileting   Lower Body Dressing Assessment/Training   [place] Level (Lower Body Dressing) supervision   Toileting   [place] Level (Toileting) supervision   Clinical Impression   Criteria for Skilled Therapeutic Interventions Met (OT) Yes, treatment indicated   OT Diagnosis Decreased ADL Indepdence   Influenced by the following impairments Acute CVA   OT Problem List-Impairments impacting ADL vision;balance   Assessment of Occupational Performance 3-5 Performance Deficits   Identified Performance Deficits dressing, bathing, toileting, fxl mob, toileting, bathing, meal prep   Planned Therapy Interventions (OT) ADL retraining;balance training;bed mobility training;visual perception;progressive activity/exercise   Clinical Decision Making Complexity (OT) detailed assessment/moderate complexity   Risk & Benefits of therapy have been explained evaluation/treatment results reviewed;care plan/treatment goals reviewed;risks/benefits reviewed   OT Total Evaluation Time   OT Eval, Moderate Complexity Minutes (90830) 10   OT Goals   Therapy Frequency (OT) 5 times/week   OT Predicted Duration/Target Date for Goal Attainment 06/07/25   OT Goals  Hygiene/Grooming;Bed Mobility;OT Goal 1   OT: Hygiene/Grooming supervision/stand-by assist  (using compensatory strategies to accommode BP/visual field cut)   OT: Bed Mobility Modified independent  (using compensatory strategies to monitor BP/visual changes w/position)   OT: Goal 1 Pt will engage in visual perceptuatl exercises to build skills/accommodations to L peripheral visual cut   Interventions   Interventions Quick Adds Self-Care/Home Management   Self-Care/Home Management   Self-Care/Home Mgmt/ADL, Compensatory, Meal Prep Minutes (14159) 24   Symptoms Noted During/After Treatment (Meal Preparation/Planning Training) dizziness   Treatment Detail/Skilled Intervention Pt in chair upon arrival and agreeable with OT eval/tx. Pt motivated to engage in therapy session, natividad present throughout session. Monitored pt BP prior to activity-sitting unsupported 151/71 and standing post 1 minute 137/70. Encouraged pt to visually fixate during changes in position on an item in room to reduce dizziness. Ambulated in room w/FWW and CGA w/vc to turn slowly and adjust to visual changes as needed. Completed bed mob w/SBA-increased dizziness going supine>sit, Ed pt on log roll tech and completed supine<>sit, reports mild changes in dizziness breaking task into steps. Increased education on the need to go slow and be thoughtful with every turn for toilet and chair transfer.   OT Discharge Planning   OT Plan Visual cut worksheets, ADLs in standing, fxl mob w/pauseing to fixate w/transfers   OT Discharge Recommendation (DC Rec) Acute Rehab Center-Motivated patient will benefit from intensive, interdisciplinary therapy.  Anticipate will be able to tolerate 3 hours of therapy per day   OT Rationale for DC Rec Pt would benefit from acute rehab for optimal visual recovery, pt would benefit from continued OT at next level of care to advance ADLs.  Pt needs 24 hour supervision for safety at home due to dizziness with  mobility/changing positions for all ADLs.   OT Brief overview of current status SBA w/VC to go slow, planful for dizziness and cues to attend to L visual peripheal cut.   OT Total Distance Amb During Session (feet) 30   Total Session Time   Timed Code Treatment Minutes 24   Total Session Time (sum of timed and untimed services) 34

## 2025-05-31 NOTE — CONSULTS
"[facility]    Stroke Consult Note    Reason for Consult:  Stroke    Chief Complaint: Eye Problem     HPI  Iesha Patel is a left handed 79 year old female with a PMH significant for hypothyroidism, aortic stenosis, history of atrial fibrillation (2018; occurred during hospitalization for lumbar fusion, she was started on Xarelto but subsequent cardiac monitor was negative for A-fib and this was discontinued), mood disorder who presented on 5/30 after several day hx (started 5/26) of L peripheral vision issues and intermittent dizzy spells for longer than that. Today, Iesha was accompanied by her daughter for the visit. Iesha reports on Monday (5/26) she initially noted there was a shadow in her peripheral vision on her left eye.  At that time she did not appreciate any vision loss in her right eye, but did notice it was difficult focusing. On Tuesday she went to her ophthalmologist who reportedly noted no abnormalities in her eyes. Then on Thursday (5/29) while driving she noted it was difficult to see the cars in the left nini.  She found her vision most difficult when she was \"scanning\" a room with her eyes.  She noted when she turned her head to the left her vision improved.  She denied any associated unilateral weakness, numbness, changes in her speech, though noted she was more fatigued than normal. She also endorsed symptoms of \"lightheadedness\" that has been ongoing for months. She often feels lightheaded when she moves from a seated to a standing position this does not happen at other times (i.e. when laying down or when walking).  The lightheadedness feels like it \"comes from her head\" and then feels like her knees are going to buckle.  In the ED, MRI brain revealed scattered right occipital lobe infarcts and MRA imaging revealed multifocal intracranial atherosclerotic disease. Presenting BP was 196/90. On aspirin 81 mg PTA.      Today, Iesha reports her visual changes " "remain unchanged.  She denies any new neurologic symptoms.  Iesha denies any history of smoking.  She denies any personal history of stroke, but otherwise was told about chronic infarct seen on MRI brain.  She denies any prior history of hypertension.  She does not have a blood pressure cuff at home.  She endorses a \"bad case\" of atrial fibrillation that occurred after lumbar surgery.  She denies any recent chest pain, shortness of breath, palpitations.  She denies any recent fevers, chills, unintentional weight loss, fatigue, night sweats.  She reports being up-to-date on her mammogram, but not for colonoscopy.  SAMANTHA screening questions: Endorses snoring, daytime sleepiness, denies apneic events, and generally feels well rested in the morning.     Stroke Evaluation Summarized  MRI/Head CT MRI brain: Small areas of infarct in the R PCA territory. Chronic L cerebellar infarct     Intracranial Vasculature MRA head: multifocal intracranial stenoses especially in the mid L P2, b/l ACAs, mild stenosis of M2s, Occlusion of R PCA   Cervical Vasculature MRA Neck: No LVO or critical stenosis      Echocardiogram The left ventricle is normal in size and function. There is normal left ventricular wall thickness. EF is 60-65%.m No regional wall motion abnormalities noted. Moderate Aortic stenosis. Normal right and left atrial size.    EKG/Telemetry Sinus Rhythm, RBBB   Other Testing Not Applicable     LDL  No lab value available in past 30 days 5/2024: 75   A1C  5/12/2025: 5.4 %  5/30/2025: 5.7 %   Troponin 5/30/2025: 11 ng/L     Impression  Subacute, scattered ischemic stroke of the right occipital lobe suspected etiology due to large-artery atherosclerosis, specifically of the right PCA, vs embolic stroke of undetermined source (ESUS).    Given history of postoperative atrial fibrillation discussed recommendation to treat for large artery atherosclerotic disease while also evaluating for recurrence of atrial fibrillation.  If " recurrence of atrial fibrillation is found would recommend transitioning from antiplatelet regimen to anticoagulation as [id number] (at minimum) would be 5.    Chronic Left Cerebellar Infarct    Episodes of intermittent lightheadedness that occurs with change of position from seated to standing   Concerning for possible orthostatic hypotension versus other    Recommendations   - Neuro checks and vital signs every 4 hours  - SBP < 180; but use lowest doses of antihypertensives if needed. Try not to rapidly drop BP given the intracranial stenoses   - Long term outpatient goal BP is <130/80 to be achieved as outpatient within several weeks. Tighter control associated with improved vascular outcomes; recommend home blood pressure monitoring and keeping a BP log for primary care follow up  - Continue Plavix 75 mg and aspirin 325mg daily together for 90 days; then aspirin 325 mg alone indefinitely  - LDL pending (goal 40-70); initiate Lipitor 40 mg with ongoing outpatient titration to achieve goal  - Hgb A1c 57%, (goal <7%)   - Encourage Mediterranean diet and daily exercise  - Appreciate PT/OT/SLP consults - may benefit from outpatient low vision OT  - SAMANTHA screening: + Discussed that untreated obstructive sleep apnea is an independent risk factor for stroke  - Bedside Glucose Monitoring  - Euthermia, Euglycemia   - Education: Reviewed [facility] stroke warning signs with Iesha and family at bedside.   - Recommend abstain from driving until undergoing formal visual field testing.     Diagnostic testing  - Continue telemetry while inpatient  - Lipid  - 14 day cardiac event monitor (Zio Patch) to be mailed to patient, (ordered)     Patient Follow-up    - in the next 1-2 week(s) with PCP  - in 6-8 weeks with general neurology or stroke [facility] ([phone]) (ordered)  - Referral to sleep medicine to evaluate for obstructive sleep apnea (ordered)    Thank you for this consult. We will follow peripherally for results of the LDL and  "if no concerns then will sign off. Please contact us with any additional questions.    Chanell Aguillon PA-C  Vascular Neurology    To page me or covering stroke neurology team member, click here: [facility]  Choose \"On Call\" tab at top, then select \"NEUROLOGY/ALL SITES\" from middle drop-down box, press Enter, then look for \"stroke\" or \"telestroke\" for your site.     _____________________________________________________    Clinically Significant Risk Factors Present on Admission                 # Drug Induced Platelet Defect: home medication list includes an antiplatelet medication                 # Financial/Environmental Concerns: none          Past Medical History    Past Medical History:   Diagnosis Date    Atrial fibrillation (H)     single episode never repeated, two separate monitor instances    Endometrial cancer (H) 2000    Insomnia      Medications   Home Meds  Prior to Admission medications    Medication Sig Start Date End Date Taking? Authorizing Provider   aspirin 81 MG EC tablet Take 81 mg by mouth daily.   Yes Reported, Patient   fluorometholone (FML LIQUIFILM) 0.1 % ophthalmic suspension Place 2 drops into both eyes daily 8/8/23  Yes Reported, Patient   FLUoxetine (PROZAC) 10 MG capsule Take 1 capsule (10 mg) by mouth daily. 1/29/25  Yes Edie Esqueda MD   levothyroxine (SYNTHROID/LEVOTHROID) 75 MCG tablet Take 1 tablet (75 mcg) by mouth daily. 1/29/25  Yes Edie Esqueda MD   MAGNESIUM CITRATE PO Take 500 mg by mouth daily.   Yes Unknown, Entered By History   Magnesium Oxide, Laxative, (LAX) 500 MG TABS tablet Take 400 mg by mouth daily. Milk of mag tablet for constipation   Yes Unknown, Entered By History   methylcellulose (CITRUCEL) powder Take 1 Tablespoonful by mouth daily   Yes Reported, Patient   polyethylene glycol (MIRALAX) 17 g packet Take 1 packet by mouth daily.   Yes Unknown, Entered By History       Scheduled Meds  Current Facility-Administered Medications   Medication Dose Route " Frequency Provider Last Rate Last Admin    aspirin EC tablet 81 mg  81 mg Oral Daily Mahi Muñiz PA-C   81 mg at 05/31/25 0759    atorvastatin (LIPITOR) tablet 40 mg  40 mg Oral QPM González Black DO   40 mg at 05/30/25 2057    clopidogrel (PLAVIX) tablet 75 mg  75 mg Oral or NG Tube Daily Mahi Muñiz PA-C   75 mg at 05/31/25 0759    fluorometholone (FML LIQUIFILM) 0.1 % ophthalmic susp 2 drop  2 drop Both Eyes Daily Mahi Muñiz PA-C   2 drop at 05/31/25 0800    FLUoxetine (PROzac) capsule 10 mg  10 mg Oral Daily Mahi Muñiz PA-C   10 mg at 05/31/25 0759    insulin aspart (NovoLOG) injection (RAPID ACTING)  1-3 Units Subcutaneous TID AC González Black DO        insulin aspart (NovoLOG) injection (RAPID ACTING)  1-3 Units Subcutaneous At Bedtime González Black DO        levothyroxine (SYNTHROID/LEVOTHROID) tablet 75 mcg  75 mcg Oral Daily Mahi Muñiz PA-C   75 mcg at 05/31/25 0759    Magnesium Citrate 250mg capsules  500 mg Oral Daily González Black DO   500 mg at 05/30/25 2057    magnesium oxide (MAG-OX) tablet 400 mg  400 mg Oral Daily Mahi Muñiz PA-C   400 mg at 05/31/25 0759    methylcellulose (CITRUCEL) tablet 500 mg  500 mg Oral Daily González Black DO   500 mg at 05/31/25 0759    polyethylene glycol (MIRALAX) Packet 17 g  17 g Oral Daily Mahi Muñiz PA-C   17 g at 05/31/25 0800    sodium chloride (PF) 0.9% PF flush 3 mL  3 mL Intracatheter Q8H [facility] Mahi Muñiz PA-C   3 mL at 05/30/25 2058       Infusion Meds  Current Facility-Administered Medications   Medication Dose Route Frequency Provider Last Rate Last Admin       Allergies   Allergies   Allergen Reactions    Penicillins Hives     Age 14, hives in mouth. 5/30/25 patient unconcerned with prior allergy and is willing to trial again, if and when needed          PHYSICAL EXAMINATION   Temp:  [97.8  F (36.6  C)-99.3  F (37.4  C)] 99.3  F (37.4  C)  Pulse:  [] 106  Resp:   [16-20] 20  BP: (112-197)/(63-89) 137/70  SpO2:  [96 %-97 %] 97 %    General Exam  General:  patient lying in bed without any acute distress    HEENT:  normocephalic/atraumatic  Pulmonary:  no respiratory distress    Neuro Exam  Mental Status:  alert, oriented age, month, situation, follows commands, speech clear and fluent, naming and repetition normal  Cranial Nerves:  visual fields intact (tested by nurse), EOMI with normal smooth pursuit, facial sensation intact and symmetric (tested by nurse), subtle left facial asymmetry, hearing not formally tested but intact to conversation, no dysarthria, shoulder shrug equal bilaterally, tongue protrusion midline  Motor:  no abnormal movements, able to move all limbs antigravity spontaneously with no signs of hemiparesis observed, no pronator drift, no lower extremity drift  Reflexes:  unable to test (telestroke)  Sensory:  light touch sensation intact and symmetric throughout upper and lower extremities (assessed by nurse), no extinction on double simultaneous stimulation (assessed by nurse)  Coordination:  normal finger-to-nose and heel-to-shin bilaterally without dysmetria  Station/Gait:  unable to test due to telestroke    Stroke Scales  NIHSS  1a. Level of Consciousness 0-->Alert, keenly responsive   1b. LOC Questions 0-->Answers both questions correctly   1c. LOC Commands 0-->Performs both tasks correctly   2.   Best Gaze 0-->Normal   3.   Visual 0-->No visual loss   4.   Facial Palsy 1-->Minor paralysis (flattened nasolabial fold, asymmetry on smiling)   5a. Motor Arm, Left 0-->No drift, limb holds 90 (or 45) degrees for full 10 secs   5b. Motor Arm, Right 0-->No drift, limb holds 90 (or 45) degrees for full 10 secs   6a. Motor Leg, Left 0-->No drift, leg holds 30 degree position for full 5 secs   6b. Motor Leg, right 0-->No drift, leg holds 30 degree position for full 5 secs   7.   Limb Ataxia 0-->Absent   8.   Sensory 0-->Normal, no sensory loss   9.   Best  "Language 0-->No aphasia, normal   10. Dysarthria 0-->Normal   11. Extinction and Inattention  0-->No abnormality   Total 1 (05/31/25 0950)     Imaging  I personally reviewed all imaging; relevant findings per HPI.    Labs Data   CBC  Recent Labs   Lab 05/31/25  0534 05/30/25  0830   WBC 5.4 4.4   RBC 4.87 4.58   HGB 14.2 13.3   HCT 42.3 41.1    218     Basic Metabolic Panel   Recent Labs   Lab 05/31/25  1242 05/31/25  0713 05/31/25  0552 05/31/25  0534 05/30/25  1439 05/30/25  0830   NA  --   --   --  136  --  136   POTASSIUM  --   --   --  4.1  --  3.9   CHLORIDE  --   --   --  101  --  99   CO2  --   --   --  24  --  26   BUN  --   --   --  10.8  --  10.3   CR  --   --   --  0.75  --  0.81   * 93 99 99   < > 60*   MELVIN  --   --   --  9.0  --  9.0    < > = values in this interval not displayed.     Liver Panel  No results for input(s): \"PROTTOTAL\", \"ALBUMIN\", \"BILITOTAL\", \"ALKPHOS\", \"AST\", \"ALT\", \"BILIDIRECT\" in the last 168 hours.  INR  No lab results found.        Stroke Consult Data Data   Telestroke Service Details  (for non-emergent stroke consult with tele)  Video start time 05/31/25   0948   Video end time 05/31/25   1040   Type of service telemedicine diagnostic assessment of acute neurological changes   Reason telemedicine is appropriate patient requires assessment with a specialist for diagnosis and treatment of neurological symptoms   Mode of transmission secure interactive audio and video communication per Brent   Originating site (patient location) [facility]    Distant site (provider location) [facility]       I have personally spent a total of 80 minutes providing care today, time spent in reviewing medical records and devising the plan as recorded above.  "

## 2025-05-31 NOTE — PROGRESS NOTES
05/31/25 1315   Appointment Info   Signing Clinician's Name / Credentials (PT) Janet Hartman, PT, DPT   Living Environment   People in Home alone   Current Living Arrangements apartment   Home Accessibility no concerns   Living Environment Comments Pt reports uses elevator to get to apartment   Self-Care   Equipment Currently Used at Home walker, rolling   Fall history within last six months yes   Number of times patient has fallen within last six months 1   Activity/Exercise/Self-Care Comment Pt reports only uses 4WW at night   General Information   Onset of Illness/Injury or Date of Surgery 05/30/25   Referring Physician González Black, DO   Patient/Family Therapy Goals Statement (PT) to go home   Pertinent History of Current Problem (include personal factors and/or comorbidities that impact the POC) 79 year old female admitted on 5/30/2025. She has a PMH of HTN, aortic valve stenosis, hypothyroidism, mood disorder, ongoing dizzy spells for the past couple of months presented to the ER for evaluation of left-sided peripheral vision loss since Monday.  Workup in ED showing head MRI showing several small foci of restricted diffusion in the right occipital lobe due to acute/subacute infarcts.  Head MRA showing multifocal intracranial stenosis.  Admitted for further treatment and care of acute ischemic stroke with stroke order set initiated.   Existing Precautions/Restrictions fall   Weight-Bearing Status - LLE full weight-bearing   Weight-Bearing Status - RLE full weight-bearing   Transfers   Transfers sit-stand transfer   Comment, (Transfers) CGA with no AD for sit<>stand transfers. Verbal cues for safety and safe hand placement.   Sit-Stand Transfer   Sit-Stand [place] (Transfers) verbal cues;contact guard;1 person assist   Comment, (Sit-Stand Transfer) CGA with no AD for sit<>stand transfers. Verbal cues for safety and safe hand placement.   Gait/Stairs (Locomotion)   [place] Level (Gait) verbal  cues;contact guard   Distance in Feet (Gait) 10'   Pattern (Gait) step-through   Deviations/Abnormal Patterns (Gait) base of support, wide;arnol decreased;gait speed decreased   Comment, (Gait/Stairs) Pt ambulates with CGA and no AD. Verbal cues for safety and posture.   Clinical Impression   Criteria for Skilled Therapeutic Intervention Yes, treatment indicated   PT Diagnosis (PT) impaired functional mobility   Influenced by the following impairments weakness, pain   Functional limitations due to impairments transfers, ambulation   Clinical Presentation (PT Evaluation Complexity) stable   Clinical Presentation Rationale Pt presents as medically diagnosed   Clinical Decision Making (Complexity) moderate complexity   Planned Therapy Interventions (PT) balance training;bed mobility training;gait training;home exercise program;patient/family education;ROM (range of motion);stair training;strengthening;stretching;transfer training   Risk & Benefits of therapy have been explained evaluation/treatment results reviewed;care plan/treatment goals reviewed;patient   PT Total Evaluation Time   PT Eval, Moderate Complexity Minutes (10970) 10   Physical Therapy Goals   PT Frequency Daily   PT Predicted Duration/Target Date for Goal Attainment 06/02/25   PT Goals Transfers;Gait;Stairs   PT: Transfers Independent;Sit to/from stand   PT: Gait Independent;150 feet   PT: Stairs Modified independent;4 stairs;Rail on both sides   Interventions   Interventions Quick Adds Gait Training;Therapeutic Activity   Therapeutic Activity   Treatment Detail/Skilled Intervention SBA with no AD for sit<>stand transfers. Verbal cues for safety. Pt sitting up in chair at end of session with call light within reach.   Gait Training   Gait Training Minutes (85795) 15   Treatment Detail/Skilled Intervention Pt ambulates with SBA and no AD. Verbal cues for safety and posture. Pt reports no lightheadedness when ambulating. Pt tends to use hallway railing  when ambulating longer distances. Pt able to ambulate without AD. Pt unsteady at times, but able to correct self without LOB   Distance in Feet 700'   [place] Level (Gait Training) stand-by assist   Physical Assistance Level (Gait Training) supervision;verbal cues   Weight Bearing (Gait Training) weight-bearing as tolerated   Pattern Analysis (Gait Training) swing-to gait   Gait Analysis Deviations decreased arnol;decreased step length;increased stride width   Impairments (Gait Analysis/Training) balance impaired;strength decreased   PT Discharge Planning   PT Plan progress transfers and ambulation with no AD, trial stairs, LE strengthening   PT Discharge Recommendation (DC Rec) (S)  home with assist;home with outpatient physical therapy   PT Rationale for DC Rec Pt lives alone. Pt would benefit from assist for safety. Pt would benefit from continued PT with outpatient PT to improve functional mobility.   PT Brief overview of current status SBA with no AD for sit<>stand transfers and 700 feet   PT Total Distance Amb During Session (feet) 700   Physical Therapy Time and Intention   Timed Code Treatment Minutes 15   Total Session Time (sum of timed and untimed services) 25     Janet Hartman, PT, DPT

## 2025-05-31 NOTE — PROGRESS NOTES
[facility] PROGRESS NOTE      Identification/Summary:   Iesha Patel is a 79 year old female admitted on 5/30/2025. She has a PMH of HTN, aortic valve stenosis, hypothyroidism, mood disorder, ongoing dizzy spells for the past couple of months presented to the ER for evaluation of left-sided peripheral vision loss since Monday.  Workup in ED showing head MRI showing several small foci of restricted diffusion in the right occipital lobe due to acute/subacute infarcts.  Head MRA showing multifocal intracranial stenosis.  Admitted for further treatment and care of acute ischemic stroke with stroke order set initiated.    Vitals overnight unremarkable, mildly hypertensive.  Basic metabolic panel, CBC this morning unremarkable.  No significant events per nursing notes.  Echo yesterday with normal EF, moderate aortic stenosis noted, progressed since 1 year ago.    Assessment and Plan:  Right PCA stroke  Appreciate neurology recommendations  Continue aspirin, Plavix  PT and OT consults today  Started statin  Possibly discharge later today pending neurorecommendations    Essential hypertension  Not taking any medications prior to admission   Permissive hypertension currently    Moderate aortic stenosis  Slightly progressed over the last year  Has follow-up appointment with cardiology in June      Diet: Regular Diet Adult  Regular Diet Adult  Fluids: None  Pain meds: Tylenol  Therapy: PT and OT  DVT Prophylaxis: Ambulation, SCDs  Code Status: No CPR- Do NOT Intubate  Medically Ready for Discharge: Anticipated Today        Interval History/Subjective:  Overall feeling better, no chest pain, shortness of breath, nausea.  No dysuria.  Still feels a little bit lightheaded when she stands up.  Vision is still not quite back to normal.    Physical Exam/Objective:  Gen: no acute distress, comfortable, alert, pleasant  ENT: no scleral icterus  Pulm: Breathing comfortably room air at rest  CV: regular rate and  rhythm, no edema  Psych: appropriate affect    Medications:   Current Facility-Administered Medications   Medication Dose Route Frequency Provider Last Rate Last Admin    aspirin EC tablet 81 mg  81 mg Oral Daily Mahi Muñiz PA-C        atorvastatin (LIPITOR) tablet 40 mg  40 mg Oral QPM González Black DO   40 mg at 05/30/25 2057    clopidogrel (PLAVIX) tablet 75 mg  75 mg Oral or NG Tube Daily Mahi Muñiz PA-C        fluorometholone (FML LIQUIFILM) 0.1 % ophthalmic susp 2 drop  2 drop Both Eyes Daily Mahi Muñiz PA-C        FLUoxetine (PROzac) capsule 10 mg  10 mg Oral Daily Mahi Muñiz PA-C        insulin aspart (NovoLOG) injection (RAPID ACTING)  1-3 Units Subcutaneous TID AC González Black DO        insulin aspart (NovoLOG) injection (RAPID ACTING)  1-3 Units Subcutaneous At Bedtime González Black DO        levothyroxine (SYNTHROID/LEVOTHROID) tablet 75 mcg  75 mcg Oral Daily Mahi Muñiz PA-C        Magnesium Citrate 250mg capsules  500 mg Oral Daily González Black DO   500 mg at 05/30/25 2057    magnesium oxide (MAG-OX) tablet 400 mg  400 mg Oral Daily Mahi Muñiz PA-C        methylcellulose (CITRUCEL) tablet 500 mg  500 mg Oral Daily González Black DO        polyethylene glycol (MIRALAX) Packet 17 g  17 g Oral Daily Mahi Muñiz PA-C        sodium chloride (PF) 0.9% PF flush 3 mL  3 mL Intracatheter Q8H JARRELL Mahi Muñiz PA-C   3 mL at 05/30/25 2058     Clinically Significant Risk Factors Present on Admission                 # Drug Induced Platelet Defect: home medication list includes an antiplatelet medication                 # Financial/Environmental Concerns: none            González Black DO   [facility]

## 2025-05-31 NOTE — PLAN OF CARE
Physical Therapy Discharge Summary    Reason for therapy discharge:    Discharged to home with outpatient therapy.    Progress towards therapy goal(s). See goals on Care Plan in [facility] electronic health record for goal details.  Goals not met.  Barriers to achieving goals:   discharge on same date as initial evaluation.    Therapy recommendation(s):    Continued therapy is recommended.  Rationale/Recommendations:  continued PT with outpatient PT to improve functional mobility.

## 2025-06-01 ENCOUNTER — ORDERS ONLY (AUTO-RELEASED) (OUTPATIENT)
Dept: INTENSIVE CARE | Facility: CLINIC | Age: 80
End: 2025-06-01
Payer: COMMERCIAL

## 2025-06-01 DIAGNOSIS — I63.9 ACUTE CVA (CEREBROVASCULAR ACCIDENT) (H): ICD-10-CM

## 2025-06-01 NOTE — PROGRESS NOTES
Occupational Therapy Discharge Summary    Reason for therapy discharge:    Discharged to home with home therapy.    Progress towards therapy goal(s). See goals on Care Plan in [facility] electronic health record for goal details.  Goals not met.  Barriers to achieving goals:   discharge from facility.    Therapy recommendation(s):    Continued therapy is recommended.  Rationale/Recommendations:  to advance visual perceptual deficits and accommodations and to have 24 hour supervision for safety with ADLs.

## 2025-06-02 ENCOUNTER — PATIENT OUTREACH (OUTPATIENT)
Dept: CARE COORDINATION | Facility: CLINIC | Age: 80
End: 2025-06-02
Payer: COMMERCIAL

## 2025-06-02 ENCOUNTER — TELEPHONE (OUTPATIENT)
Dept: FAMILY MEDICINE | Facility: CLINIC | Age: 80
End: 2025-06-02
Payer: COMMERCIAL

## 2025-06-02 NOTE — TELEPHONE ENCOUNTER
Home Care is calling regarding an established patient with [facility].  Requesting orders from: Edie Esqueda RN APPROVED: RN able to provide verbal orders.  Home Care will send orders for signature.  RN will close encounter.  Is this a request for a temporary pause in the home care episode?  No    Orders Requested    Skilled Nursing  Request for initial evaluation and treatment (one time)   RN gave verbal order: Yes      Contacts       Contact Date/Time Type Contact Phone/Fax    06/02/2025 03:43 PM CDT Phone (Incoming) Alisha MCBRIDE [facility] [phone]     secure voicemail          Jones He RN

## 2025-06-02 NOTE — PROGRESS NOTES
Clinic Care Coordination Contact  [facility]/Voicemail    Clinical Data: Care Coordinator Outreach    Outreach Documentation Number of Outreach Attempt   6/2/2025   2:44 PM 1       Left message on patient's voicemail with call back information and requested return call.      Plan:  Care Coordinator will try to reach patient again in 1-2 business days.    David Myhre, RN   [facility] RN  [phone]

## 2025-06-03 ENCOUNTER — TELEPHONE (OUTPATIENT)
Dept: FAMILY MEDICINE | Facility: CLINIC | Age: 80
End: 2025-06-03
Payer: COMMERCIAL

## 2025-06-03 DIAGNOSIS — F34.1 DYSTHYMIA: ICD-10-CM

## 2025-06-03 DIAGNOSIS — I63.9 ACUTE CVA (CEREBROVASCULAR ACCIDENT) (H): ICD-10-CM

## 2025-06-03 RX ORDER — FLUOXETINE 10 MG/1
10 CAPSULE ORAL DAILY
Qty: 90 CAPSULE | Refills: 4 | Status: SHIPPED | OUTPATIENT
Start: 2025-06-03

## 2025-06-03 RX ORDER — CLOPIDOGREL BISULFATE 75 MG/1
75 TABLET ORAL DAILY
Qty: 90 TABLET | Refills: 0 | Status: SHIPPED | OUTPATIENT
Start: 2025-06-03

## 2025-06-03 NOTE — TELEPHONE ENCOUNTER
Please note: Home Care RN also wanting to report interaction between clopidogrel and fluoxetine on patients medication list. Asking how to proceed. Please advise. Verbal Orders given for requests below.     Home Care is calling regarding an established patient with [facility].    Requesting orders from: Edie Esqueda RN APPROVED: RN able to provide verbal orders.  Home Care will send orders for signature.  RN will close encounter.  Is this a request for a temporary pause in the home care episode?  No    Interaction between clopidogrel and fluoxetine     Orders Requested    Skilled Nursing  Request for initial certification (first set of orders)   Frequency: 1 x week for 3 weeks, every other week for 6 weeks  RN gave verbal order: Yes    Physical Therapy  Request for initial evaluation and treatment (one time)   RN gave verbal order: Yes    Occupational Therapy  Request for initial evaluation and treatment (one time)   RN gave verbal order: Yes      Phone number Home Care can be reached at: [phone]    Okay to leave a detailed message?: Yes    Contacts       Contact Date/Time Type Contact Phone/Fax    06/03/2025 11:02 AM CDT Phone (Incoming) REED Romero [facility] (Home Care) [phone]          Edgar Renee RN

## 2025-06-03 NOTE — TELEPHONE ENCOUNTER
Writer called and spoke to REED Romero and relayed message per PCP.    LUCI Wick, RN  [facility]

## 2025-06-04 ENCOUNTER — PATIENT OUTREACH (OUTPATIENT)
Dept: CARE COORDINATION | Facility: CLINIC | Age: 80
End: 2025-06-04
Payer: COMMERCIAL

## 2025-06-04 NOTE — PROGRESS NOTES
Clinic Care Coordination Contact  Transitions of Care Outreach  Chief Complaint   Patient presents with    Clinic Care Coordination - Post Hospital       Most Recent Admission Date: 5/30/2025   Most Recent Admission Diagnosis: Acute CVA (cerebrovascular accident) (H) - I63.9     Most Recent Discharge Date: 5/31/2025   Most Recent Discharge Diagnosis: Acute CVA (cerebrovascular accident) (H) - I63.9  Other specified counseling - Z71.89  Impaired functional mobility, balance, gait, and endurance - Z74.09     Transitions of Care Assessment    Discharge Assessment  How are you doing now that you are home?: I am doing well. Still having a little issue with my vision, but I am taking it easy.  How are your symptoms? (Red Flag symptoms escalate to triage hotline per guidelines): Improved  Do you know how to contact your clinic care team if you have future questions or changes to your health status? : Yes  Does the patient have their discharge instructions? : Yes  Does the patient have questions regarding their discharge instructions? : No  Were you started on any new medications or were there changes to any of your previous medications? : Yes  Does the patient have all of their medications?: Yes  Do you have questions regarding any of your medications? : No  Do you have all of your needed medical supplies or equipment (DME)?  (i.e. oxygen tank, CPAP, cane, etc.): Yes         Post-op (Clinicians Only)  Did the patient have surgery or a procedure: No  Fever: No  Chills: No  Eating & Drinking: eating and drinking without complaints/concerns  PO Intake: regular diet  Bowel Function: normal  Urinary Status: voiding without complaint/concerns    Care Management       Care Mgmt General Assessment      CCC RN spoke with patient today to follow up after recent hospitalization. Patient reported she is doing well at home. She stated she is taking it easy as she said she continues to have issues with her vision. She stated her vision  is improving. Patient reported she is taking her medications as directed. She is aware of follow appointment with her PCP on 6/9/25. Patient reported she has good support from her daughters who live is close proximity to her. Patient reported home care has been out to see her. No needs or concerns expressed.                    Follow up Plan     Discharge Follow-Up  Discharge follow up appointment scheduled in alignment with recommended follow up timeframe or Transitions of Risk Category? (Low = within 30 days; Moderate= within 14 days; High= within 7 days): Yes  Discharge Follow Up Appointment Date: 06/09/25  Discharge Follow Up Appointment Scheduled with?: Primary Care Provider    Future Appointments   Date Time Provider Department Center   6/9/2025 11:00 AM Edie Esqueda MD [id number]   6/25/2025 12:45 PM JN HC ECHO STAFF [facility]   6/25/2025  1:45 PM JN HC MON [facility]   7/31/2025  8:00 AM Isabel Olivares PA-C [facility]   8/21/2025 10:30 AM Bud Antony APRN CNP [facility]       Outpatient Plan as outlined on AVS reviewed with patient.    For any urgent concerns, please contact our 24 hour nurse triage line: [phone] ([phone])       David J. Myhre, RN

## 2025-06-05 ENCOUNTER — TELEPHONE (OUTPATIENT)
Dept: FAMILY MEDICINE | Facility: CLINIC | Age: 80
End: 2025-06-05
Payer: COMMERCIAL

## 2025-06-05 NOTE — TELEPHONE ENCOUNTER
Called and spoke with Janet PT ([facility])    Per Janet, patient continues to have nursing services. Patient is not being discharged from home health nursing at this time. Nursing is continuing to monitor the patient due to elevated BP and other ongoing medical needs. Patient does have upcoming cardiology appt and EKG on 6/25, and is to wear a cardiac monitor for 2 weeks as well.    Janet reports the patient doesn't have any residual deficits from recent stroke as of evaluation today.  Patient was previously still having left field cut issues with vision, saw eye doctor this morning and did assessment and said nothing is wrong with her eyesight. Cleared by the eye doctor.  If Dr. Esqueda feels patient needs more assessment patient should go to [facility] for an outpatient driving assessment. Dr. Esqueda would need to make the referral to [facility] if this is recommended. Would need to be discharged from home health in order to have this evaluation.  Patient does have upcoming appointment with PCP on Monday 6/9/25.    Janet reports the only ongoing issue she noted during her evaluation today is that patient has had elevated BP. BP was 150/62 today, has been in this range since hospitalization. Janet reports patient was asymptomatic.    Denies further questions or concerns at this time.    Routing to provider to review and advise next steps.    Rina Vora RN  [facility]

## 2025-06-05 NOTE — TELEPHONE ENCOUNTER
Home Health Care    Reason for call:  MARIEL Gonzales calling from [facility] with an FYI - Had an eval with the patient today. Pt does not need PT. Would benefit from nursing discharge, outpatient driving assessment since she's had a stroke.       Pt Provider: Yin    Phone Number Homecare Nurse can be reached at: [phone]

## 2025-06-05 NOTE — TELEPHONE ENCOUNTER
Please clarify-- what does nursing discharge mean?  And will team be seeing up driving assessment if patient/family agreeable?

## 2025-06-06 ENCOUNTER — APPOINTMENT (OUTPATIENT)
Dept: CT IMAGING | Facility: CLINIC | Age: 80
End: 2025-06-06
Attending: EMERGENCY MEDICINE
Payer: COMMERCIAL

## 2025-06-06 ENCOUNTER — HOSPITAL ENCOUNTER (EMERGENCY)
Facility: CLINIC | Age: 80
Discharge: HOME | End: 2025-06-06
Attending: EMERGENCY MEDICINE | Admitting: EMERGENCY MEDICINE
Payer: COMMERCIAL

## 2025-06-06 VITALS
HEART RATE: 73 BPM | HEIGHT: 69 IN | DIASTOLIC BLOOD PRESSURE: 82 MMHG | OXYGEN SATURATION: 95 % | BODY MASS INDEX: 23.11 KG/M2 | SYSTOLIC BLOOD PRESSURE: 184 MMHG | WEIGHT: 156 LBS | RESPIRATION RATE: 17 BRPM
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
## 2025-06-06 DIAGNOSIS — S30.0XXA TRAUMATIC HEMATOMA OF LOWER BACK, INITIAL ENCOUNTER: ICD-10-CM

## 2025-06-06 DIAGNOSIS — R55 NEAR SYNCOPE: ICD-10-CM

## 2025-06-06 DIAGNOSIS — I95.1 ORTHOSTATIC HYPOTENSION: ICD-10-CM
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
## 2025-06-06 LAB
ABO + RH BLD: NORMAL
ALBUMIN SERPL BCG-MCNC: 4.7 G/DL (ref 3.5–5.2)
ALBUMIN UR-MCNC: NEGATIVE MG/DL
ALP SERPL-CCNC: 75 U/L (ref 40–150)
ALT SERPL W P-5'-P-CCNC: 34 U/L (ref 0–50)
ANION GAP SERPL CALCULATED.3IONS-SCNC: 12 MMOL/L (ref 7–15)
APPEARANCE UR: CLEAR
APTT PPP: 28 SECONDS (ref 22–38)
AST SERPL W P-5'-P-CCNC: 80 U/L (ref 0–45)
ATRIAL RATE - MUSE: 73 BPM
BASOPHILS # BLD AUTO: 0 10E3/UL (ref 0–0.2)
BASOPHILS NFR BLD AUTO: 1 %
BILIRUB SERPL-MCNC: 0.3 MG/DL
BILIRUB UR QL STRIP: NEGATIVE
BLD GP AB SCN SERPL QL: NEGATIVE
BUN SERPL-MCNC: 13.8 MG/DL (ref 8–23)
CALCIUM SERPL-MCNC: 9.4 MG/DL (ref 8.8–10.4)
CHLORIDE SERPL-SCNC: 98 MMOL/L (ref 98–107)
COLOR UR AUTO: COLORLESS
CREAT SERPL-MCNC: 0.8 MG/DL (ref 0.51–0.95)
DIASTOLIC BLOOD PRESSURE - MUSE: 101 MMHG
EGFRCR SERPLBLD CKD-EPI 2021: 75 ML/MIN/1.73M2
EOSINOPHIL # BLD AUTO: 0.2 10E3/UL (ref 0–0.7)
EOSINOPHIL NFR BLD AUTO: 4 %
ERYTHROCYTE [DISTWIDTH] IN BLOOD BY AUTOMATED COUNT: 15.6 % (ref 10–15)
ETHANOL SERPL-MCNC: <0.01 G/DL
GLUCOSE SERPL-MCNC: 108 MG/DL (ref 70–99)
GLUCOSE UR STRIP-MCNC: NEGATIVE MG/DL
HCO3 SERPL-SCNC: 27 MMOL/L (ref 22–29)
HCT VFR BLD AUTO: 43.8 % (ref 35–47)
HGB BLD-MCNC: 14.7 G/DL (ref 11.7–15.7)
HGB UR QL STRIP: NEGATIVE
IMM GRANULOCYTES # BLD: 0 10E3/UL
IMM GRANULOCYTES NFR BLD: 0 %
INR PPP: 0.92 (ref 0.85–1.15)
INTERPRETATION ECG - MUSE: NORMAL
KETONES UR STRIP-MCNC: NEGATIVE MG/DL
LEUKOCYTE ESTERASE UR QL STRIP: NEGATIVE
LYMPHOCYTES # BLD AUTO: 1.7 10E3/UL (ref 0.8–5.3)
LYMPHOCYTES NFR BLD AUTO: 31 %
MAGNESIUM SERPL-MCNC: 2.5 MG/DL (ref 1.7–2.3)
MCH RBC QN AUTO: 29.3 PG (ref 26.5–33)
MCHC RBC AUTO-ENTMCNC: 33.6 G/DL (ref 31.5–36.5)
MCV RBC AUTO: 87 FL (ref 78–100)
MONOCYTES # BLD AUTO: 0.5 10E3/UL (ref 0–1.3)
MONOCYTES NFR BLD AUTO: 9 %
NEUTROPHILS # BLD AUTO: 3 10E3/UL (ref 1.6–8.3)
NEUTROPHILS NFR BLD AUTO: 55 %
NITRATE UR QL: NEGATIVE
NRBC # BLD AUTO: 0 10E3/UL
NRBC BLD AUTO-RTO: 0 /100
P AXIS - MUSE: 78 DEGREES
PH UR STRIP: 8 [PH] (ref 5–7)
PLATELET # BLD AUTO: 256 10E3/UL (ref 150–450)
POTASSIUM SERPL-SCNC: 3.9 MMOL/L (ref 3.4–5.3)
PR INTERVAL - MUSE: 150 MS
PROT SERPL-MCNC: 7.5 G/DL (ref 6.4–8.3)
PROTHROMBIN TIME: 12.6 SECONDS (ref 11.8–14.8)
QRS DURATION - MUSE: 132 MS
QT - MUSE: 422 MS
QTC - MUSE: 464 MS
R AXIS - MUSE: 31 DEGREES
RBC # BLD AUTO: 5.01 10E6/UL (ref 3.8–5.2)
RBC URINE: 1 /HPF
SODIUM SERPL-SCNC: 137 MMOL/L (ref 135–145)
SP GR UR STRIP: 1.01 (ref 1–1.03)
SPECIMEN EXP DATE BLD: NORMAL
SYSTOLIC BLOOD PRESSURE - MUSE: 187 MMHG
T AXIS - MUSE: 66 DEGREES
TROPONIN T SERPL HS-MCNC: 10 NG/L
TROPONIN T SERPL HS-MCNC: 13 NG/L
UROBILINOGEN UR STRIP-MCNC: NORMAL MG/DL
VENTRICULAR RATE- MUSE: 73 BPM
WBC # BLD AUTO: 5.4 10E3/UL (ref 4–11)
WBC URINE: <1 /HPF

## 2025-06-06 PROCEDURE — 81001 URINALYSIS AUTO W/SCOPE: CPT | Performed by: EMERGENCY MEDICINE

## 2025-06-06 PROCEDURE — 99285 EMERGENCY DEPT VISIT HI MDM: CPT | Mod: 25

## 2025-06-06 PROCEDURE — 86901 BLOOD TYPING SEROLOGIC RH(D): CPT | Performed by: EMERGENCY MEDICINE

## 2025-06-06 PROCEDURE — 72125 CT NECK SPINE W/O DYE: CPT

## 2025-06-06 PROCEDURE — 85730 THROMBOPLASTIN TIME PARTIAL: CPT | Performed by: EMERGENCY MEDICINE

## 2025-06-06 PROCEDURE — 80051 ELECTROLYTE PANEL: CPT | Performed by: EMERGENCY MEDICINE

## 2025-06-06 PROCEDURE — 85025 COMPLETE CBC W/AUTO DIFF WBC: CPT | Performed by: EMERGENCY MEDICINE

## 2025-06-06 PROCEDURE — 71260 CT THORAX DX C+: CPT

## 2025-06-06 PROCEDURE — 250N000011 HC RX IP 250 OP 636: Performed by: EMERGENCY MEDICINE

## 2025-06-06 PROCEDURE — 82077 ASSAY SPEC XCP UR&BREATH IA: CPT | Performed by: EMERGENCY MEDICINE

## 2025-06-06 PROCEDURE — 83735 ASSAY OF MAGNESIUM: CPT | Performed by: EMERGENCY MEDICINE

## 2025-06-06 PROCEDURE — 36415 COLL VENOUS BLD VENIPUNCTURE: CPT | Performed by: EMERGENCY MEDICINE

## 2025-06-06 PROCEDURE — 84484 ASSAY OF TROPONIN QUANT: CPT | Performed by: EMERGENCY MEDICINE

## 2025-06-06 PROCEDURE — 70450 CT HEAD/BRAIN W/O DYE: CPT

## 2025-06-06 PROCEDURE — 93005 ELECTROCARDIOGRAM TRACING: CPT | Performed by: EMERGENCY MEDICINE

## 2025-06-06 PROCEDURE — 85610 PROTHROMBIN TIME: CPT | Performed by: EMERGENCY MEDICINE

## 2025-06-06 RX ORDER — IOPAMIDOL 755 MG/ML
90 INJECTION, SOLUTION INTRAVASCULAR ONCE
Status: COMPLETED | OUTPATIENT
Start: 2025-06-06 | End: 2025-06-06

## 2025-06-06 RX ORDER — LIDOCAINE 40 MG/G
CREAM TOPICAL
Status: DISCONTINUED | OUTPATIENT
Start: 2025-06-06 | End: 2025-06-06 | Stop reason: HOSPADM

## 2025-06-06 RX ADMIN — IOPAMIDOL 90 ML: 755 INJECTION, SOLUTION INTRAVENOUS at 03:59

## 2025-06-06 ASSESSMENT — ACTIVITIES OF DAILY LIVING (ADL)
ADLS_ACUITY_SCORE: 47

## 2025-06-06 ASSESSMENT — ENCOUNTER SYMPTOMS
DIZZINESS: 1
WOUND: 1
DIAPHORESIS: 1
ABDOMINAL PAIN: 1
LIGHT-HEADEDNESS: 1

## 2025-06-06 NOTE — ED TRIAGE NOTES
To ED per EMS    Pt states she fell after having abd pain/dizziness. On plavix. Denies hitting head.     Pt arrives with hematoma to L flank, and pain to L elbow and shoulder.      Triage Assessment (Adult)       Row Name 06/06/25 0319          Triage Assessment    Airway WDL WDL        Respiratory WDL    Respiratory WDL WDL        Skin Circulation/Temperature WDL    Skin Circulation/Temperature WDL WDL        Cardiac WDL    Cardiac WDL WDL        Peripheral/Neurovascular WDL    Peripheral Neurovascular WDL WDL        Cognitive/Neuro/Behavioral WDL    Cognitive/Neuro/Behavioral WDL WDL        [place] Coma Scale    Best Eye Response 4-->(E4) spontaneous     Best Motor Response 6-->(M6) obeys commands

## 2025-06-07 ENCOUNTER — HEALTH MAINTENANCE LETTER (OUTPATIENT)
Age: 80
End: 2025-06-07

## 2025-06-09 ENCOUNTER — RESULTS FOLLOW-UP (OUTPATIENT)
Dept: FAMILY MEDICINE | Facility: CLINIC | Age: 80
End: 2025-06-09

## 2025-06-09 ENCOUNTER — OFFICE VISIT (OUTPATIENT)
Dept: FAMILY MEDICINE | Facility: CLINIC | Age: 80
End: 2025-06-09
Payer: COMMERCIAL

## 2025-06-09 VITALS
RESPIRATION RATE: 16 BRPM | HEART RATE: 82 BPM | SYSTOLIC BLOOD PRESSURE: 132 MMHG | BODY MASS INDEX: 23.51 KG/M2 | TEMPERATURE: 98.1 F | HEIGHT: 69 IN | WEIGHT: 158.7 LBS | DIASTOLIC BLOOD PRESSURE: 60 MMHG | OXYGEN SATURATION: 98 %

## 2025-06-09 DIAGNOSIS — R74.01 ELEVATED AST (SGOT): ICD-10-CM

## 2025-06-09 DIAGNOSIS — R10.13 EPIGASTRIC PAIN: ICD-10-CM

## 2025-06-09 DIAGNOSIS — I63.9 ACUTE ISCHEMIC STROKE (H): Primary | ICD-10-CM

## 2025-06-09 DIAGNOSIS — R10.13 EPIGASTRIC PAIN: Primary | ICD-10-CM

## 2025-06-09 DIAGNOSIS — R74.8 ELEVATED LIPASE: ICD-10-CM

## 2025-06-09 DIAGNOSIS — I95.1 ORTHOSTASIS: ICD-10-CM

## 2025-06-09 DIAGNOSIS — E78.5 DYSLIPIDEMIA: ICD-10-CM

## 2025-06-09 LAB
ERYTHROCYTE [DISTWIDTH] IN BLOOD BY AUTOMATED COUNT: 15.5 % (ref 10–15)
HCT VFR BLD AUTO: 38.5 % (ref 35–47)
HGB BLD-MCNC: 12.7 G/DL (ref 11.7–15.7)
MCH RBC QN AUTO: 29.3 PG (ref 26.5–33)
MCHC RBC AUTO-ENTMCNC: 33 G/DL (ref 31.5–36.5)
MCV RBC AUTO: 89 FL (ref 78–100)
PLATELET # BLD AUTO: 247 10E3/UL (ref 150–450)
RBC # BLD AUTO: 4.33 10E6/UL (ref 3.8–5.2)
WBC # BLD AUTO: 6.7 10E3/UL (ref 4–11)

## 2025-06-09 PROCEDURE — 3078F DIAST BP <80 MM HG: CPT | Performed by: FAMILY MEDICINE

## 2025-06-09 PROCEDURE — 80076 HEPATIC FUNCTION PANEL: CPT | Performed by: FAMILY MEDICINE

## 2025-06-09 PROCEDURE — 1126F AMNT PAIN NOTED NONE PRSNT: CPT | Performed by: FAMILY MEDICINE

## 2025-06-09 PROCEDURE — 83690 ASSAY OF LIPASE: CPT | Performed by: FAMILY MEDICINE

## 2025-06-09 PROCEDURE — 99213 OFFICE O/P EST LOW 20 MIN: CPT | Performed by: FAMILY MEDICINE

## 2025-06-09 PROCEDURE — 36415 COLL VENOUS BLD VENIPUNCTURE: CPT | Performed by: FAMILY MEDICINE

## 2025-06-09 PROCEDURE — 85027 COMPLETE CBC AUTOMATED: CPT | Performed by: FAMILY MEDICINE

## 2025-06-09 PROCEDURE — 3075F SYST BP GE 130 - 139MM HG: CPT | Performed by: FAMILY MEDICINE

## 2025-06-09 ASSESSMENT — PAIN SCALES - GENERAL: PAINLEVEL_OUTOF10: NO PAIN (0)

## 2025-06-09 NOTE — PROGRESS NOTES
[facility]  [address]  [place] [address]  Phone: [phone]  Fax: [phone]    Patient:  Iesha Patel, Date of birth 1945  Date of Visit:  06/09/2025  Referring Provider No ref. provider found  Reason for visit: Hospital follow-up, stroke       Assessment & Plan     Acute ischemic stroke (H):  - Recent stroke confirmed with MRI, with a previous stroke suspected 3-4 months prior. Symptoms include peripheral vision loss in the left eye, which has since resolved. Lightheadedness and blood pressure fluctuations are attributed to stroke recovery.  - Continue monitoring blood pressure, aiming for gradual reduction. Neurology follow-up scheduled for June 25, 2025. Polysomnography planned to assess for sleep apnea, which may increase stroke risk.  - Cleared to drive for ophthalmology, I recommend caution while experiencing orthostasis    Orthostasis:  - Lightheadedness and dizziness likely related to orthostatic hypotension, exacerbated by stroke recovery, likely a vasovagal component with occurrence in the middle the night with urination.  - Use a walker for stability due to blood pressure fluctuations. Monitor symptoms.    Epigastric pain  Elevated AST (SGOT):  - Suspected link to atorvastatin use, with l AST elevation noted. Possible contribution from iron supplements causing constipation.  With history of mild iron deficiency anemia, it is also possible she has ulcerative disease though no preceding symptoms.  - Discontinue atorvastatin. Recheck liver function and CBC and will assess lipase levels. Discontinue iron supplements for one week to allow symptoms to settle.  - Further recommendations regarding cholesterol management pending response.    Dyslipidemia:  - Dyslipidemia management complicated by atorvastatin side effects.  - Discontinue atorvastatin due to potential liver impact and stomach pain. Reassess cholesterol management strategy after liver function  evaluation.                      Subjective   Iesha is a 79 year old female who presents for Hospital F/U (Hosp 5-30-25 cva ER  6-6-25 fall) and Abdominal Pain (Past 3 nights severe abd pain. Reaction to med?)  History of Present Illness    Stroke:  - Peripheral vision loss in the left eye started on May 25, 2025, improved after treatment.  - MRI confirmed two strokes; one recent and one approximately 3-4 months old.  - No weakness or neurological symptoms noted after the fall, which is suspected to be related to the first stroke.  - Experienced dizziness and weakness in the knees, leading to the use of a walker.  - Ophthalmology visit with resolution of peripheral vision loss.  - Receiving home health care.  Using walker primarily due to presyncopal episodes.    Stomach Pain:  - Severe stomach pain occurring at 1:00 AM for three consecutive nights, not present the previous night.  - Pain described as gnawing, located in the stomach area.  - Constipation reported, possibly linked to current medications.  - Liver function tests showed changes from normal to slightly abnormal during ER visit.    Dizziness and Lightheadedness:  - Episodes of dizziness and lightheadedness, particularly when standing up.  - Blood pressure fluctuations noted, possibly related to stroke recovery, intracerebral stenoses.  - Use of a walker recommended due to dizziness and knee weakness.           6/4/2025   Post Discharge Outreach   How are you doing now that you are home? I am doing well. Still having a little issue with my vision, but I am taking it easy.   How are your symptoms? (Red Flag symptoms escalate to triage hotline per guidelines) Improved   Does the patient have their discharge instructions?  Yes   Does the patient have questions regarding their discharge instructions?  No   Were you started on any new medications or were there changes to any of your previous medications?  Yes   Does the patient have all of their medications?  "Yes   Do you have questions regarding any of your medications?  No   Do you have all of your needed medical supplies or equipment (DME)?  (i.e. oxygen tank, CPAP, cane, etc.) Yes   Discharge Follow Up Appointment Date 6/9/2025   Discharge Follow Up Appointment Scheduled with? Primary Care Provider       Hospital Follow-up Visit:    Hospital/Nursing Home/IP Rehab Facility: [facility]  Most Recent Admission Date: 6/6/2025   Most Recent Admission Diagnosis:      Most Recent Discharge Date: 6/6/2025   Most Recent Discharge Diagnosis: Fall at home, initial encounter - W19.XXXA, Y92.009  Traumatic hematoma of lower back, initial encounter - S30.0XXA  Orthostatic hypotension - I95.1  Near syncope - R55   Do you have any other stressors you would like to discuss with your provider? No    Problems taking medications regularly:  None  Medication changes since discharge: Stopped atorvastatin 2 days ago  Problems adhering to non-medication therapy:  None    Summary of hospitalization:  [facility] discharge summary reviewed  Diagnostic Tests/Treatments reviewed.  Follow up needed: Scheduled with neurology, sleep clinic, receiving home health services.  Other Healthcare Providers Involved in Patient s Care:         Homecare  Update since discharge: improved.         Plan of care communicated with patient and family              Pertinent history obtain from: patient and patient's child(fransisca)    Objective     Vital signs:  /60   Pulse 82   Temp 98.1  F (36.7  C) (Oral)   Resp 16   Ht 1.753 m (5' 9\")   Wt 72 kg (158 lb 11.2 oz)   LMP  (LMP Unknown)   SpO2 98%   BMI 23.44 kg/m    Blood pressure 132/60, pulse 82, temperature 98.1  F (36.7  C), temperature source Oral, resp. rate 16, height 1.753 m (5' 9\"), weight 72 kg (158 lb 11.2 oz), SpO2 98%.  Physical Exam    Alert and oriented, pleasant, appropriate affect.  Able to provide history without difficulty.  Face moves " symmetrically.  Mucous membranes moist.  Heart with regular rate and rhythm, no murmurs.  Lungs clear and well aerated.  Extremities without edema.  Area of hematoma right mid back with some firmness underlying consistent with resolving hematoma.    Results    - MRI: Confirmed two strokes; one recent and one approximately 3-4 months old.  - CT scan: No fractures noted; hematoma observed under the skin in the normal fatty tissue layer.  - Blood work from ER:  - ALT: Increased from 18 to 34 (still within normal range).  - AST: Increased from 29 to 80 (abnormal).  - Blood sugar: 108 (non-fasting, under stress).  - RDW: Elevated, likely due to regeneration of red blood cells from iron supplementation.  - Magnesium level: Slightly high, not concerning.  - Urine specimen: Normal.       Laboratory data and imaging listed below was reviewed prior to this encounter.             Consent was obtained from the patient to use an AI documentation tool in the creation of  this note          Answers submitted by the patient for this visit:  Patient Health Questionnaire (Submitted on 6/9/2025)  If you checked off any problems, how difficult have these problems made it for you to do your work, take care of things at home, or get along with other people?: Not difficult at all  PHQ9 TOTAL SCORE: 2

## 2025-06-09 NOTE — PATIENT INSTRUCTIONS
Remain off a atorvastatin.  We are going to recheck your liver function.  Will also check your blood counts and kidney test called lipase.    Continue taking your clopidogrel (Plavix) and aspirin as prescribed.

## 2025-06-10 ENCOUNTER — MYC MEDICAL ADVICE (OUTPATIENT)
Dept: FAMILY MEDICINE | Facility: CLINIC | Age: 80
End: 2025-06-10
Payer: COMMERCIAL

## 2025-06-10 LAB
ALBUMIN SERPL BCG-MCNC: 4.2 G/DL (ref 3.5–5.2)
ALP SERPL-CCNC: 72 U/L (ref 40–150)
ALT SERPL W P-5'-P-CCNC: 51 U/L (ref 0–50)
AST SERPL W P-5'-P-CCNC: 50 U/L (ref 0–45)
BILIRUB SERPL-MCNC: 0.3 MG/DL
BILIRUBIN DIRECT (ROCHE PRO & PURE): 0.22 MG/DL (ref 0–0.45)
LIPASE SERPL-CCNC: 70 U/L (ref 13–60)
PROT SERPL-MCNC: 6.9 G/DL (ref 6.4–8.3)

## 2025-06-10 NOTE — TELEPHONE ENCOUNTER
See lab result notes, from the PCP, to the patient, on 6/10/25.    Writer called the patient and relayed the above message to the patient, who verbalized understanding and agrees with the plan.    Writer also relayed the contact number, to the patient, to schedule with imaging, at [phone].    Denies other questions or concerns at this time.    Ramona Jenkins RN, BSN  [facility]

## 2025-06-13 ENCOUNTER — HOSPITAL ENCOUNTER (OUTPATIENT)
Dept: ULTRASOUND IMAGING | Facility: CLINIC | Age: 80
Discharge: HOME | End: 2025-06-13
Attending: FAMILY MEDICINE | Admitting: FAMILY MEDICINE
Payer: COMMERCIAL

## 2025-06-13 DIAGNOSIS — R10.13 EPIGASTRIC PAIN: ICD-10-CM

## 2025-06-13 DIAGNOSIS — R74.01 ELEVATED AST (SGOT): ICD-10-CM

## 2025-06-13 DIAGNOSIS — R74.8 ELEVATED LIPASE: ICD-10-CM

## 2025-06-13 PROCEDURE — 76705 ECHO EXAM OF ABDOMEN: CPT

## 2025-06-16 ENCOUNTER — RESULTS FOLLOW-UP (OUTPATIENT)
Dept: FAMILY MEDICINE | Facility: CLINIC | Age: 80
End: 2025-06-16

## 2025-06-18 ENCOUNTER — TELEPHONE (OUTPATIENT)
Dept: FAMILY MEDICINE | Facility: CLINIC | Age: 80
End: 2025-06-18
Payer: COMMERCIAL

## 2025-06-18 NOTE — TELEPHONE ENCOUNTER
[facility],      Home Care is calling regarding an established patient with [facility].  Requesting orders from: Edie Esqueda RN APPROVED: RN able to provide verbal orders.  Home Care will send orders for signature.  RN will close encounter.  Is this a request for a temporary pause in the home care episode?  No    Orders Requested    Skilled Nursing  Request for delay in care, service to be provided within 7 days of previously   Service rescheduled to 6/25/25  The patient is wanting to reschedule to next week as she doesn't want a nursing visit this week.   RN gave verbal order: Yes              Radhika Chahal RN

## 2025-06-26 ENCOUNTER — TELEPHONE (OUTPATIENT)
Dept: FAMILY MEDICINE | Facility: CLINIC | Age: 80
End: 2025-06-26
Payer: COMMERCIAL

## 2025-06-26 NOTE — TELEPHONE ENCOUNTER
Home Care is calling regarding an established patient with [facility].  Requesting orders from: Edie Esqueda RN APPROVED: RN able to provide verbal orders.  Home Care will send orders for signature.  RN will close encounter.  Is this a request for a temporary pause in the home care episode?  No    Orders Requested    Skilled Nursing  Request for discontinuation of care   Goals have been met/progressing.  Frequency: plan to discharge pt from home care on July 10th.   RN gave verbal order: Yes      Contacts       Contact Date/Time Type Contact Phone/Fax    06/26/2025 09:57 AM CDT Phone (Incoming) Uri MCBRIDE [facility] [phone]     Secure Voicemail          Jones He RN

## 2025-06-30 ENCOUNTER — RESULTS FOLLOW-UP (OUTPATIENT)
Dept: CARE COORDINATION | Facility: CLINIC | Age: 80
End: 2025-06-30

## 2025-06-30 LAB — CV ZIO PRELIM RESULTS: NORMAL

## 2025-07-21 ASSESSMENT — PATIENT HEALTH QUESTIONNAIRE - PHQ9
SUM OF ALL RESPONSES TO PHQ QUESTIONS 1-9: 1
10. IF YOU CHECKED OFF ANY PROBLEMS, HOW DIFFICULT HAVE THESE PROBLEMS MADE IT FOR YOU TO DO YOUR WORK, TAKE CARE OF THINGS AT HOME, OR GET ALONG WITH OTHER PEOPLE: NOT DIFFICULT AT ALL
SUM OF ALL RESPONSES TO PHQ QUESTIONS 1-9: 1

## 2025-07-22 ENCOUNTER — OFFICE VISIT (OUTPATIENT)
Dept: FAMILY MEDICINE | Facility: CLINIC | Age: 80
End: 2025-07-22
Payer: COMMERCIAL

## 2025-07-22 ENCOUNTER — RESULTS FOLLOW-UP (OUTPATIENT)
Dept: FAMILY MEDICINE | Facility: CLINIC | Age: 80
End: 2025-07-22

## 2025-07-22 VITALS
RESPIRATION RATE: 17 BRPM | SYSTOLIC BLOOD PRESSURE: 118 MMHG | HEART RATE: 96 BPM | BODY MASS INDEX: 24.1 KG/M2 | HEIGHT: 68 IN | WEIGHT: 159 LBS | DIASTOLIC BLOOD PRESSURE: 66 MMHG | OXYGEN SATURATION: 96 % | TEMPERATURE: 99 F

## 2025-07-22 DIAGNOSIS — E03.9 HYPOTHYROIDISM, UNSPECIFIED TYPE: ICD-10-CM

## 2025-07-22 DIAGNOSIS — R74.01 ELEVATED AST (SGOT): ICD-10-CM

## 2025-07-22 DIAGNOSIS — I63.9 ACUTE CVA (CEREBROVASCULAR ACCIDENT) (H): Primary | ICD-10-CM

## 2025-07-22 DIAGNOSIS — I63.9 ACUTE ISCHEMIC STROKE (H): Primary | ICD-10-CM

## 2025-07-22 LAB
ALBUMIN SERPL BCG-MCNC: 4.1 G/DL (ref 3.5–5.2)
ALP SERPL-CCNC: 56 U/L (ref 40–150)
ALT SERPL W P-5'-P-CCNC: 14 U/L (ref 0–50)
AST SERPL W P-5'-P-CCNC: 30 U/L (ref 0–45)
BILIRUB SERPL-MCNC: 0.3 MG/DL
BILIRUBIN DIRECT (ROCHE PRO & PURE): 0.11 MG/DL (ref 0–0.45)
ERYTHROCYTE [DISTWIDTH] IN BLOOD BY AUTOMATED COUNT: 13.6 % (ref 10–15)
HCT VFR BLD AUTO: 40 % (ref 35–47)
HGB BLD-MCNC: 13.2 G/DL (ref 11.7–15.7)
MCH RBC QN AUTO: 29.6 PG (ref 26.5–33)
MCHC RBC AUTO-ENTMCNC: 33 G/DL (ref 31.5–36.5)
MCV RBC AUTO: 90 FL (ref 78–100)
PLATELET # BLD AUTO: 208 10E3/UL (ref 150–450)
PROT SERPL-MCNC: 6.8 G/DL (ref 6.4–8.3)
RBC # BLD AUTO: 4.46 10E6/UL (ref 3.8–5.2)
T4 FREE SERPL-MCNC: 0.92 NG/DL (ref 0.9–1.7)
TSH SERPL DL<=0.005 MIU/L-ACNC: 5.66 UIU/ML (ref 0.3–4.2)
WBC # BLD AUTO: 4.8 10E3/UL (ref 4–11)

## 2025-07-22 PROCEDURE — 3074F SYST BP LT 130 MM HG: CPT | Performed by: FAMILY MEDICINE

## 2025-07-22 PROCEDURE — 80076 HEPATIC FUNCTION PANEL: CPT | Performed by: FAMILY MEDICINE

## 2025-07-22 PROCEDURE — 99214 OFFICE O/P EST MOD 30 MIN: CPT | Performed by: FAMILY MEDICINE

## 2025-07-22 PROCEDURE — 84443 ASSAY THYROID STIM HORMONE: CPT | Performed by: FAMILY MEDICINE

## 2025-07-22 PROCEDURE — 3078F DIAST BP <80 MM HG: CPT | Performed by: FAMILY MEDICINE

## 2025-07-22 PROCEDURE — 1126F AMNT PAIN NOTED NONE PRSNT: CPT | Performed by: FAMILY MEDICINE

## 2025-07-22 PROCEDURE — G2211 COMPLEX E/M VISIT ADD ON: HCPCS | Performed by: FAMILY MEDICINE

## 2025-07-22 PROCEDURE — 36415 COLL VENOUS BLD VENIPUNCTURE: CPT | Performed by: FAMILY MEDICINE

## 2025-07-22 PROCEDURE — 85027 COMPLETE CBC AUTOMATED: CPT | Performed by: FAMILY MEDICINE

## 2025-07-22 PROCEDURE — 84439 ASSAY OF FREE THYROXINE: CPT | Performed by: FAMILY MEDICINE

## 2025-07-22 ASSESSMENT — PAIN SCALES - GENERAL: PAINLEVEL_OUTOF10: NO PAIN (0)

## 2025-07-22 NOTE — PROGRESS NOTES
[facility]  [address] [facility] [address]  [facility] [address]  Phone: [phone]  Fax: [phone]    Patient:  Iesha aPtel, Date of birth 1945  Date of Visit:  07/22/2025  Referring Provider No ref. provider found  Reason for visit: follow-up stroke and dizziness       Assessment & Plan     Acute CVA (cerebrovascular accident) (H)  - Continue Plavix and aspirin together for 90 days as per hospital discharge plan, then switch to aspirin alone indefinitely.  - Confirm with neurology regarding the duration of Plavix therapy. Monitor for bruising, especially as Plavix is stopped.  - Stroke likely caused by plaque in the arteries, not atrial fibrillation (A-fib). A-fib would require a different treatment approach with blood thinners.  - Focus on aspirin therapy, blood pressure management, and cholesterol management.    Elevated AST (SGOT)  - Atorvastatin discontinued due to elevated liver function tests (approximately double normal range) and severe nocturnal stomach pain with profuse sweating, occurring three nights in a row, resolved within days of stopping medication.  - Check liver function again to ensure levels have returned to normal or near normal. Consult with pharmacist regarding the safety of resuming atorvastatin or considering a less potent alternative at a lower dose. Monitor closely for any stomach pain upon resuming medication.    Hypothyroidism, unspecified type  - Thyroid function needs to be checked to rule out any contributing factors to lightheadedness.    Chronic dry eye  - Chronic dry eye causing difficulty in opening eyes at night due to dryness and pain.  - Consider using greasy ointments like Genteal or Systane, which contain mineral oil and petroleum, for nighttime use. Explore the option of using an eye patch to keep the eye sealed shut at night.    Lightheadedness  - Lightheadedness may be related to the previous stroke or blood pressure medication. Thyroid  function needs to be checked to rule out any contributing factors.  - Check blood pressure intermittently, especially after sitting for 5 to 10 minutes. Encourage leg pumping before standing to improve blood circulation.    Patient Instructions (excluding labs):  - Continue Plavix and aspirin together for 90 days from hospital discharge, then switch to aspirin alone indefinitely.  - Monitor for bruising, especially as Plavix is stopped.  - If atorvastatin is restarted, monitor for stomach pain and stop immediately if pain occurs.  - Use greasy ointments (such as Genteal or Systane) at night for dry eyes; consider an eye patch if ointments are not effective.  - Check blood pressure at home intermittently, especially after sitting for 5-10 minutes.  - Pump legs before standing up to help reduce lightheadedness.  - Use walker and night light at night for safety.  - Contact clinic if new or worsening symptoms develop.       The longitudinal plan of care for the diagnosis(es)/condition(s) as documented were addressed during this visit. Due to the added complexity in care, I will continue to support Iesha in the subsequent management and with ongoing continuity of care.      33 minutes spent by me on the date of the encounter doing chart review, history and exam, documentation and further activities per the note               Subjective   Iesha is a 79 year old female who presents for RECHECK (Follow up -from a stroke )  History of Present Illness    Ischemic Stroke and Post-Stroke Management  - Hospitalized for stroke, discharged May 31, 2025  - Plavix and aspirin started at hospital discharge, instructed to continue both for 90 days, then aspirin alone indefinitely  - No history of atrial fibrillation reported  - Stroke attributed to plaque in arteries  - Blood pressure at home in the 140s for 2 weeks after hospital discharge, now reports lower readings (as low as 118)  - No current blood pressure medication  - Bruising  "noted while on aspirin and Plavix, including persistent but fading bruise with residual tenderness; another bruise attributed to trauma (hitting back of toilet), now resolved  - Reports ongoing lightheadedness, not present every time on standing, but with occasional episodes of knee weakness, especially after rising from recliner or car  - No recurrence of passing out since initial event  - Continues to use walker at night for safety, gets up once or twice nightly  - No echo or heart ultrasound performed after hospital discharge; only one ultrasound done during hospitalization    Statin Intolerance  - Atorvastatin discontinued due to elevated liver function tests (approximately double normal range) and severe nocturnal stomach pain with profuse sweating, occurring three nights in a row, resolved within days of stopping medication  - Unable to tolerate Mediterranean diet due to gastrointestinal symptoms from vegetables (broccoli, cabbage, cauliflower)    Chronic Dry Eye  - Chronic dry eye, worse in left eye, especially at night  - Has tried multiple gels (including Genteal gel) without lasting relief; finds gels ineffective and uncomfortable  - Uses ointments (e.g., Genteal, Systane) with some benefit, but vision remains cloudy after use  - Reports difficulty opening eyes at night due to dryness  - Uses night light and glasses at night for safety    Misc  - Reports not having regained full stamina since stroke, not yet able to walk a mile, but is walking again and feels good while walking  - No mention of fever, chest pain, or palpitations         Pertinent history obtain from: patient    Objective     Vital signs:  /66 (BP Location: Left arm, Patient Position: Sitting, Cuff Size: Adult Regular)   Pulse 96   Temp 99  F (37.2  C) (Temporal)   Resp 17   Ht 1.715 m (5' 7.52\")   Wt 72.1 kg (159 lb)   LMP  (LMP Unknown)   SpO2 96%   BMI 24.52 kg/m    Blood pressure 118/66, pulse 96, temperature 99  F (37.2 " " C), temperature source Temporal, resp. rate 17, height 1.715 m (5' 7.52\"), weight 72.1 kg (159 lb), SpO2 96%.  Physical Exam    Alert very pleasant.  Face moves symmetrically.  No difficulties with ambulation.        Results    - Liver function test: elevated, roughly double the normal range  - Echocardiogram: performed during hospital stay, no second echo scheduled       Laboratory data and imaging listed below was reviewed prior to this encounter.             Consent was obtained from the patient to use an AI documentation tool in the creation of  this note          Answers submitted by the patient for this visit:  Patient Health Questionnaire (Submitted on 7/21/2025)  If you checked off any problems, how difficult have these problems made it for you to do your work, take care of things at home, or get along with other people?: Not difficult at all  PHQ9 TOTAL SCORE: 1  General Questionnaire (Submitted on 7/21/2025)  Chief Complaint: Chronic problems general questions HPI Form  What is the reason for your visit today? : Follow up from stroke recovery  How many servings of fruits and vegetables do you eat daily?: 2-3  On average, how many sweetened beverages do you drink each day (Examples: soda, juice, sweet tea, etc.  Do NOT count diet or artificially sweetened beverages)?: 0  How many minutes a day do you exercise enough to make your heart beat faster?: 10 to 19  How many days a week do you exercise enough to make your heart beat faster?: 6  How many days per week do you miss taking your medication?: 0  Questionnaire about: Chronic problems general questions HPI Form (Submitted on 7/21/2025)  Chief Complaint: Chronic problems general questions HPI Form    "

## 2025-07-24 RX ORDER — ROSUVASTATIN CALCIUM 10 MG/1
10 TABLET, COATED ORAL DAILY
Qty: 90 TABLET | Refills: 1 | Status: SHIPPED | OUTPATIENT
Start: 2025-07-24

## 2025-07-24 RX ORDER — LEVOTHYROXINE SODIUM 88 UG/1
88 TABLET ORAL
Qty: 90 TABLET | Refills: 1 | Status: SHIPPED | OUTPATIENT
Start: 2025-07-24

## 2025-07-24 NOTE — RESULT ENCOUNTER NOTE
Your thyroid is stil running slightly under-treated.  I recommend we bump up your levothyroxine dose to 88 mcg and recheck your thyroid in 6 weeks at a lab only visit.    Your liver function is now normal.  I recommend we begin the medication rosuvastatin, which has a similar effect on cholesterol as atorvastatin when it comes to cholesterol and vascular diseases like stroke, but is less likely to irritate the liver. We will start with a lower dose of 10 mg daily and recheck your liver function in 6 weeks.  If you develop any nausea, pain, early fullness, or yellowing of your skin, please know.

## 2025-07-30 NOTE — PROGRESS NOTES
__________________________________________________________      [facility]   [phone]  __________________________________________________________         History of Present Illness   Chief Complaint: Patient presents with:  Stroke: Follow up       Iesha Patel is a 79 year old female presenting for follow-up for stroke.     She was hospitalized at [facility] on 5/30/2025 - 5/31/2025. Prior to the hospital stay, she had a past medical history of hypothyroidism, mood disorder, aortic stenosis, history of atrial fibrillation (2018; occurred during hospitalization for lumbar fusion, initially on Xarelto but subsequent cardiac monitor negative for A-fib and anticoagulation discontinued).  On PTA ASA 81 mg daily.      She presented to the hospital with left peripheral vision loss since 5/26.  Saw ophthalmology who ruled out intraocular pathology.  She reported longstanding positional lightheadedness.  /90.  Found to have scattered right occipital ischemic infarcts and multifocal intracranial atherosclerotic stenoses, notably with right PCA occlusion.  Workup as outlined below.    She was started on aspirin 325 mg daily indefinitely and Plavix 75 mg daily x 90 days for recurrent stroke prevention.  She was referred to sleep medicine clinic for sleep apnea testing, has visit scheduled in August.  She was recommended follow-up with ophthalmology for formal visual field testing.  Since being discharged, she completed 14-day Zio patch which was negative for atrial fibrillation but showed 44 episodes of SVT. Was recommended to follow-up with her PCP for further recommendations.    She indicates that she was initially discharged on atorvastatin but stopped taking this due to stomach pains that resolved with stopping the medication.  Her PCP switched her over to rosuvastatin 10 mg daily and she has not yet started taking as she wanted to discuss at our visit today.  She  endorses persistent balance issues however wonders if the chronic stroke that had been seen on the MRI was potentially due to an episode of severe disequilibrium that occurred approximately 4 months prior.  She had gotten up in the middle of the night to walk to the bathroom and suddenly fell to the side.  She indicates it felt as though she was drunk as though she had complete disequilibrium.  She fell into the closet shelf and hurt her rib.  She denied any vision changes at that time.    She denies any recurrent/new strokelike episodes since discharge however still has some persistent balance issues.  She she feels as though her vision completely resolved following the stroke.  She followed up with ophthalmology who cleared her for driving.  She is not doing skilled therapies at this time.  Does not require use of cane/walker.  She has not had any bleeding issues, still taking aspirin/Plavix.  Her blood pressures following the stroke were in the 140s.  She is not on any antihypertensive.  Blood pressure has been around the 130s.  Today 136/64.    She does endorse that she has been struggling with concern for orthostatic hypotension, following with PCP.  She will occasionally feel lightheaded/dizzy, but different than her general disequilibrium since her stroke.  She denies feeling palpitations or racing heart.      Modified [place] Scale  Score: 1-No significant disability despite symptoms; able to carry out all usual duties and activities    Stroke Evaluation Summarized:  New results resulted and reviewed by me today are in BOLD below.  I personally reviewed the following neuroimaging studies today and the comments above reflect my own personal interpretation of the images: images: MRI brain, MRA head, MRA neck, and I also showed MRI to the patient myself today.    MRI/Head CT MRI brain: Small areas of infarct in the R PCA territory. Chronic L cerebellar infarct     Intracranial Vasculature MRA head: multifocal  "intracranial stenoses especially in the mid L P2, b/l ACAs, mild stenosis of M2s, Occlusion of R PCA   Cervical Vasculature MRA Neck: No LVO or critical stenosis       Echocardiogram The left ventricle is normal in size and function. There is normal left ventricular wall thickness. EF is 60-65%.m No regional wall motion abnormalities noted. Moderate Aortic stenosis. Normal right and left atrial size.    EKG/Telemetry Sinus Rhythm, RBBB    13-day Zio patch:Normal multiday cardiac patch monitor.  No rhythm disturbance identified which would account for the patient's reported cerebral infarction.  The patient's symptomatic recordings including apparent \"fainting\" correlated with normal sinus rhythm for the majority of recordings and the remainder of recordings demonstrated minor rhythm abnormalities not likely to be the cause the reported symptoms.No sustained atrial or ventricular tachyarrhythmia.No profound bradycardia or significant/symptomatic pauses.  Technical data:Patient had a min HR of 48 bpm, max HR of 207 bpm, and avg HR of 77 bpm. Predominant underlying rhythm was Sinus Rhythm. 44 Supraventricular Tachycardia runs occurred, the run with the fastest interval lasting 6 beats with a max rate of 207 bpm, the longest lasting 16.7 secs with an avg rate of 112 bpm. Supraventricular Tachycardia was detected within +/- 45 seconds of symptomatic patient event(s). Isolated SVEs were rare (<1.0%), SVE Couplets were rare (<1.0%), and SVE Triplets were rare (<1.0%). Isolated VEs were rare (<1.0%, 1447), VE Couplets were rare (<1.0%, 12), and VE Triplets were rare (<1.0%, 2). Ventricular Trigeminy was present.    Other Testing 6/6/2025: CT CAP: Mild contusion, small foci of hemorrhage from superficial vessels, and small hematoma in the subcutaneous fat of the left back at the level of L1-L4. No acute intrathoracic or intra-abdominal/pelvic findings.        Labs Lab Results   Component Value Date    LDL 85 05/31/2025    " "A1C 5.7 (H) 05/30/2025    CTROPT 13 06/06/2025    INR 0.92 06/06/2025               Home Medications     Current Outpatient Medications   Medication Sig Dispense Refill    aspirin (ASA) 325 MG EC tablet Take 1 tablet (325 mg) by mouth daily. 90 tablet 3    clopidogrel (PLAVIX) 75 MG tablet Take 1 tablet (75 mg) by mouth or NG Tube daily. 90 tablet 0    fluorometholone (FML LIQUIFILM) 0.1 % ophthalmic suspension Place 2 drops into both eyes daily      FLUoxetine (PROZAC) 10 MG capsule Take 1 capsule (10 mg) by mouth daily. 90 capsule 4    levothyroxine (SYNTHROID/LEVOTHROID) 88 MCG tablet Take 1 tablet (88 mcg) by mouth every morning (before breakfast). 90 tablet 1    MAGNESIUM CITRATE PO Take 500 mg by mouth daily.      Magnesium Oxide, Laxative, (LAX) 500 MG TABS tablet Take 400 mg by mouth daily. Milk of mag tablet for constipation      methylcellulose (CITRUCEL) powder Take 1 Tablespoonful by mouth daily      polyethylene glycol (MIRALAX) 17 g packet Take 1 packet by mouth daily.      rosuvastatin (CRESTOR) 10 MG tablet Take 1 tablet (10 mg) by mouth daily. 90 tablet 1     No current facility-administered medications for this visit.            Physical Examination     Physical Exam    Estimated body mass index is 24.68 kg/m  as calculated from the following:    Height as of 7/22/25: 1.715 m (5' 7.52\").    Weight as of this encounter: 72.6 kg (160 lb).    /64   Pulse 72   Resp 16   Wt 72.6 kg (160 lb)   LMP  (LMP Unknown)   BMI 24.68 kg/m         General Exam  General: Sitting up in chair in no acute distress  Pulmonary:  no respiratory distress    Neurologic:  Mental Status:  alert, oriented x 3, follows commands, speech clear and fluent, naming and repetition normal  Cranial Nerves:  visual fields intact, EOMI with normal smooth pursuit, facial sensation intact and symmetric, facial movements symmetric, hearing not formally tested but intact to conversation, no dysarthria, tongue protrusion " midline  Motor:  normal muscle tone and bulk, no abnormal movements, able to move all limbs spontaneously, strength 5/5 throughout upper and lower extremities, no pronator drift  Sensory:  light touch sensation intact and symmetric throughout upper and lower extremities, no extinction on double simultaneous stimulation   Coordination:  normal finger-to-nose and heel-to-shin bilaterally without dysmetria  Station/Gait:  deferred           Screenings and Questionnaires:     Tobacco:    Tobacco Use      Smoking status: Never      Smokeless tobacco: Never  -Smoking screen: Denied history of smoking  -Alcohol screen: Denies alcohol use  -Drug screen: Denies illicit drug use.      Sleep Apnea:   -Sleep Apnea screen: Had endorsed snoring and daytime sleepiness in hospital, was referred to sleep medicine clinic to test for sleep apnea, visit scheduled for August    Depression:  -Depression screen: Does not usually feel depressed.  She has been on Prozac for many years and sees a psychologist.  She feels she has a good support system.      4/19/2023     7:07 PM 9/4/2022     3:48 PM   PHQ-2 ( 1999 [employer])   Q1: Little interest or pleasure in doing things 0    Q2: Feeling down, depressed or hopeless 1    PHQ-2 Score 1    Q1: Little interest or pleasure in doing things Not at all    Q2: Feeling down, depressed or hopeless Several days    PHQ-2 Score 1 Incomplete       Stroke Recovery and Risk Factors Questionnaire:      7/30/2025     6:49 AM   Stroke Questionnaire   Residual effects: Any residual effects from stroke? Yes, I do   Residual effects: Most bothersome symptom Lightheadedness   Level of independence: Walking Independent   Level of independence: Eating Independent   Level of independence: Stairs Independent   Level of independence: Dressing Independent   Level of independence: Bathing Independent   Level of independence: Toileting Independent   Incontinence: Bowel? No   Incontinence: Bladder? No   Able to: Use  electronics Yes   Able to: Cook or do house chores Yes   Able to: Do own shopping, including online Yes   Able to: Manage own finances Yes   Current therapy: Physical Therapy No   Current therapy: Occupation Therapy No   Current therapy: Speech Therapy No   Current therapy: Other No   Current services: Home Health No, not needed   Medication: How do you take your meds Myself, from individual bottles   Medication: Do you ever miss or forget meds Rarely   Risk Factor: Checking blood pressure at home Yes   Risk Factor: Usual blood pressure numbers Within normal range   Risk Factor: Checking blood sugar at home No   Risk Factor: Second-hand smoke at home No   Risk Factor: How much caffeine per day I cup of coffee a day   Who completed this questionnaire? The patient independently             Assessment and Plan       # May 2025  right occipital ischemic infarct with right PCA occlusion  Suspect related to large artery intracranial atherosclerotic disease (ICAD) versus less likely embolic stroke of undetermined source (ESUS), reassuringly Zio patch was negative for atrial fibrillation  # Chronic left cerebellar ischemic infarct  # History of atrial fibrillation  Felt to be provoked following lumbar surgery without recurrence on subsequent heart monitoring  -Continue aspirin 325 mg daily indefinitely (instructed patient not to stop without stroke neurology involvement)  -Stop taking Plavix 75 mg daily after completing total 90-day course (last day 8/28/2025)  -Follow-up again with Isabel Olivares PA-C in 6 months  -Education provided about stroke BE FAST and ICH symptoms and GI bleeding risk     # 44 episodes of asymptomatic SVT seen on heart monitor  # Episodes of lightheadedness/dizziness felt to be due to orthostatic hypotension, query if potentially may be associated with some episodes of SVT?  - Follow-up with primary care provider for further recommendations and consideration for cardiology  referral    # Hyperlipidemia  -Continue atorvastatin 40 mg daily  -LDL goal 40-70, <40 increases risk of intracranial hemorrhage  -Defer to PCP for ongoing titration    # Hypertension  -Defer to PCP for antihypertensive management.  - BP goal <130/80 (with tighter control if tolerated)    # Prediabetes  -Defer to PCP for diabetes prevention, long-term A1c goal less than 7%.    # Snoring and daytime sleepiness, query sleep apnea  - Follow-up as scheduled 8/21/2025 with sleep medicine clinic for testing     # Chronic depression  - Takes Prozac and has a psychologist  -Recommend to reach out to providers if any symptoms worsen      - Return in about 6 months (around 1/31/2026) for with Isabel Olivares PA-C.    Stroke Education provided.  She will call us with any questions.  For any acute neurologic deficits she was advised to  go directly to the hospital rather than call the clinic.      Isabel Olivares PA-C  Neurology  07/31/2025     ____________________________________________________________________    Billing:  Please note: for coding purposes this visit should be billed only as established and not new, since this patient was seen by my same subspecialty team ([facility]) during the hospitalization that this visit is a follow up for.  I spent a total of 60 minutes on the day of the visit.   Time spent by me today doing chart review, history and exam, documentation and further activities per the note      *All or a portion of this note was generated using voice recognition software and may contain transcription errors.

## 2025-07-31 ENCOUNTER — OFFICE VISIT (OUTPATIENT)
Dept: NEUROLOGY | Facility: CLINIC | Age: 80
End: 2025-07-31
Payer: COMMERCIAL

## 2025-07-31 VITALS
RESPIRATION RATE: 16 BRPM | WEIGHT: 160 LBS | SYSTOLIC BLOOD PRESSURE: 136 MMHG | BODY MASS INDEX: 24.68 KG/M2 | DIASTOLIC BLOOD PRESSURE: 64 MMHG | HEART RATE: 72 BPM

## 2025-07-31 DIAGNOSIS — Z86.73 HISTORY OF STROKE: ICD-10-CM

## 2025-07-31 DIAGNOSIS — R73.03 PREDIABETES: ICD-10-CM

## 2025-07-31 DIAGNOSIS — R06.83 SNORING: ICD-10-CM

## 2025-07-31 DIAGNOSIS — I10 ESSENTIAL HYPERTENSION: ICD-10-CM

## 2025-07-31 DIAGNOSIS — E78.5 HYPERLIPIDEMIA WITH TARGET LDL LESS THAN 70: ICD-10-CM

## 2025-07-31 DIAGNOSIS — I63.531 CEREBROVASCULAR ACCIDENT (CVA) DUE TO OCCLUSION OF RIGHT POSTERIOR CEREBRAL ARTERY (H): Primary | ICD-10-CM

## 2025-07-31 DIAGNOSIS — I47.10 PAROXYSMAL SUPRAVENTRICULAR TACHYCARDIA: ICD-10-CM

## 2025-07-31 DIAGNOSIS — I63.9 ACUTE CVA (CEREBROVASCULAR ACCIDENT) (H): ICD-10-CM

## 2025-07-31 NOTE — NURSING NOTE
Chief Complaint   Patient presents with    Stroke     Follow up    Patient mentions that she has a list of questions that she would like to talk with the provider about.    Shantell Bird MA,CMA,7:50 AM

## 2025-07-31 NOTE — PATIENT INSTRUCTIONS
-Continue aspirin 325 mg daily indefinitely (instructed patient not to stop without stroke neurology involvement)  -Stop taking Plavix 75 mg daily after completing total 90-day course (last day 8/28/2025)  -Continue atorvastatin 40 mg daily   -Follow-up with your primary care provider for ongoing management and monitoring of blood pressure (goal less than 130 top number (systolic)/ 80 bottom number (diastolic), cholesterol (goal LDL 40-70), and diabetes prevention  (long term A1c goal <7%)  -Follow-up again with Isabel Olivares PA-C in 6 months  -Follow-up with sleep medicine clinic for sleep apnea testing as scheduled 8/21/2025  -Follow-up with PCP regarding supraventricular tachycardia seen on heart monitor    Daily activities:  -monitor your blood pressure at home twice daily in AM and PM, keep log and bring to medical visits   -I recommend Mediterranean diet (see attachment), moderate aerobic exercise at least 30 minutes/day x 5 days/week    Precautions:  - Recommend waiting a minimum of 3 months post-stroke for any elective procedures. Defer weighing risks/benefits of more urgent procedures to the surgical team. There is a higher risk (~12% risk within 3 months, ~ 5% risk at 3-6 months, ~ 2% risk at 6-12 months).  - If you have any bleeding or black/tarry stools, you must let your PCP or me know. If any future providers request that you hold or stop taking Aspirin/Plavix then please reach out to our stroke team to be included in the discussion.  - Call our stroke clinic with any questions or concerns at [phone], or send a [employer] medical advice message  - Call 911 with any new stroke symptoms: 'BE FAST' (Hand-out attached)     *Any results will be communicated via [employer] portal message or phone.    It was a pleasure meeting you today. You are certainly welcome to reach out with any additional questions.

## 2025-07-31 NOTE — LETTER
7/31/2025      Iesha Patel  [address]  [facility] [address]      Dear Colleague,    Thank you for referring your patient, Iesha Patel, to the [facility] [place]. Please see a copy of my visit note below.    __________________________________________________________      [facility]   [phone]  __________________________________________________________         History of Present Illness   Chief Complaint: Patient presents with:  Stroke: Follow up       Iesha Patel is a 79 year old female presenting for follow-up for stroke.     She was hospitalized at [facility] on 5/30/2025 - 5/31/2025. Prior to the hospital stay, she had a past medical history of hypothyroidism, mood disorder, aortic stenosis, history of atrial fibrillation (2018; occurred during hospitalization for lumbar fusion, initially on Xarelto but subsequent cardiac monitor negative for A-fib and anticoagulation discontinued).  On PTA ASA 81 mg daily.      She presented to the hospital with left peripheral vision loss since 5/26.  Saw ophthalmology who ruled out intraocular pathology.  She reported longstanding positional lightheadedness.  /90.  Found to have scattered right occipital ischemic infarcts and multifocal intracranial atherosclerotic stenoses, notably with right PCA occlusion.  Workup as outlined below.    She was started on aspirin 325 mg daily indefinitely and Plavix 75 mg daily x 90 days for recurrent stroke prevention.  She was referred to sleep medicine clinic for sleep apnea testing, has visit scheduled in August.  She was recommended follow-up with ophthalmology for formal visual field testing.  Since being discharged, she completed 14-day Zio patch which was negative for atrial fibrillation but showed 44 episodes of SVT. Was recommended to follow-up with her PCP for further recommendations.    She indicates that she was initially  discharged on atorvastatin but stopped taking this due to stomach pains that resolved with stopping the medication.  Her PCP switched her over to rosuvastatin 10 mg daily and she has not yet started taking as she wanted to discuss at our visit today.  She endorses persistent balance issues however wonders if the chronic stroke that had been seen on the MRI was potentially due to an episode of severe disequilibrium that occurred approximately 4 months prior.  She had gotten up in the middle of the night to walk to the bathroom and suddenly fell to the side.  She indicates it felt as though she was drunk as though she had complete disequilibrium.  She fell into the closet shelf and hurt her rib.  She denied any vision changes at that time.    She denies any recurrent/new strokelike episodes since discharge however still has some persistent balance issues.  She she feels as though her vision completely resolved following the stroke.  She followed up with ophthalmology who cleared her for driving.  She is not doing skilled therapies at this time.  Does not require use of cane/walker.  She has not had any bleeding issues, still taking aspirin/Plavix.  Her blood pressures following the stroke were in the 140s.  She is not on any antihypertensive.  Blood pressure has been around the 130s.  Today 136/64.    She does endorse that she has been struggling with concern for orthostatic hypotension, following with PCP.  She will occasionally feel lightheaded/dizzy, but different than her general disequilibrium since her stroke.  She denies feeling palpitations or racing heart.      Modified [place] Scale  Score: 1-No significant disability despite symptoms; able to carry out all usual duties and activities    Stroke Evaluation Summarized:  New results resulted and reviewed by me today are in BOLD below.  I personally reviewed the following neuroimaging studies today and the comments above reflect my own personal interpretation of  "the images: images: MRI brain, MRA head, MRA neck, and I also showed MRI to the patient myself today.    MRI/Head CT MRI brain: Small areas of infarct in the R PCA territory. Chronic L cerebellar infarct     Intracranial Vasculature MRA head: multifocal intracranial stenoses especially in the mid L P2, b/l ACAs, mild stenosis of M2s, Occlusion of R PCA   Cervical Vasculature MRA Neck: No LVO or critical stenosis       Echocardiogram The left ventricle is normal in size and function. There is normal left ventricular wall thickness. EF is 60-65%.m No regional wall motion abnormalities noted. Moderate Aortic stenosis. Normal right and left atrial size.    EKG/Telemetry Sinus Rhythm, RBBB    13-day Zio patch:Normal multiday cardiac patch monitor.  No rhythm disturbance identified which would account for the patient's reported cerebral infarction.  The patient's symptomatic recordings including apparent \"fainting\" correlated with normal sinus rhythm for the majority of recordings and the remainder of recordings demonstrated minor rhythm abnormalities not likely to be the cause the reported symptoms.No sustained atrial or ventricular tachyarrhythmia.No profound bradycardia or significant/symptomatic pauses.  Technical data:Patient had a min HR of 48 bpm, max HR of 207 bpm, and avg HR of 77 bpm. Predominant underlying rhythm was Sinus Rhythm. 44 Supraventricular Tachycardia runs occurred, the run with the fastest interval lasting 6 beats with a max rate of 207 bpm, the longest lasting 16.7 secs with an avg rate of 112 bpm. Supraventricular Tachycardia was detected within +/- 45 seconds of symptomatic patient event(s). Isolated SVEs were rare (<1.0%), SVE Couplets were rare (<1.0%), and SVE Triplets were rare (<1.0%). Isolated VEs were rare (<1.0%, 1447), VE Couplets were rare (<1.0%, 12), and VE Triplets were rare (<1.0%, 2). Ventricular Trigeminy was present.    Other Testing 6/6/2025: CT CAP: Mild contusion, small foci " "of hemorrhage from superficial vessels, and small hematoma in the subcutaneous fat of the left back at the level of L1-L4. No acute intrathoracic or intra-abdominal/pelvic findings.        Labs Lab Results   Component Value Date    LDL 85 05/31/2025    A1C 5.7 (H) 05/30/2025    CTROPT 13 06/06/2025    INR 0.92 06/06/2025               Home Medications     Current Outpatient Medications   Medication Sig Dispense Refill     aspirin (ASA) 325 MG EC tablet Take 1 tablet (325 mg) by mouth daily. 90 tablet 3     clopidogrel (PLAVIX) 75 MG tablet Take 1 tablet (75 mg) by mouth or NG Tube daily. 90 tablet 0     fluorometholone (FML LIQUIFILM) 0.1 % ophthalmic suspension Place 2 drops into both eyes daily       FLUoxetine (PROZAC) 10 MG capsule Take 1 capsule (10 mg) by mouth daily. 90 capsule 4     levothyroxine (SYNTHROID/LEVOTHROID) 88 MCG tablet Take 1 tablet (88 mcg) by mouth every morning (before breakfast). 90 tablet 1     MAGNESIUM CITRATE PO Take 500 mg by mouth daily.       Magnesium Oxide, Laxative, (LAX) 500 MG TABS tablet Take 400 mg by mouth daily. Milk of mag tablet for constipation       methylcellulose (CITRUCEL) powder Take 1 Tablespoonful by mouth daily       polyethylene glycol (MIRALAX) 17 g packet Take 1 packet by mouth daily.       rosuvastatin (CRESTOR) 10 MG tablet Take 1 tablet (10 mg) by mouth daily. 90 tablet 1     No current facility-administered medications for this visit.            Physical Examination     Physical Exam    Estimated body mass index is 24.68 kg/m  as calculated from the following:    Height as of 7/22/25: 1.715 m (5' 7.52\").    Weight as of this encounter: 72.6 kg (160 lb).    /64   Pulse 72   Resp 16   Wt 72.6 kg (160 lb)   LMP  (LMP Unknown)   BMI 24.68 kg/m         General Exam  General: Sitting up in chair in no acute distress  Pulmonary:  no respiratory distress    Neurologic:  Mental Status:  alert, oriented x 3, follows commands, speech clear and fluent, " naming and repetition normal  Cranial Nerves:  visual fields intact, EOMI with normal smooth pursuit, facial sensation intact and symmetric, facial movements symmetric, hearing not formally tested but intact to conversation, no dysarthria, tongue protrusion midline  Motor:  normal muscle tone and bulk, no abnormal movements, able to move all limbs spontaneously, strength 5/5 throughout upper and lower extremities, no pronator drift  Sensory:  light touch sensation intact and symmetric throughout upper and lower extremities, no extinction on double simultaneous stimulation   Coordination:  normal finger-to-nose and heel-to-shin bilaterally without dysmetria  Station/Gait:  deferred           Screenings and Questionnaires:     Tobacco:    Tobacco Use      Smoking status: Never      Smokeless tobacco: Never  -Smoking screen: Denied history of smoking  -Alcohol screen: Denies alcohol use  -Drug screen: Denies illicit drug use.      Sleep Apnea:   -Sleep Apnea screen: Had endorsed snoring and daytime sleepiness in hospital, was referred to sleep medicine clinic to test for sleep apnea, visit scheduled for August    Depression:  -Depression screen: Does not usually feel depressed.  She has been on Prozac for many years and sees a psychologist.  She feels she has a good support system.      4/19/2023     7:07 PM 9/4/2022     3:48 PM   PHQ-2 ( 1999 [employer])   Q1: Little interest or pleasure in doing things 0    Q2: Feeling down, depressed or hopeless 1    PHQ-2 Score 1    Q1: Little interest or pleasure in doing things Not at all    Q2: Feeling down, depressed or hopeless Several days    PHQ-2 Score 1 Incomplete       Stroke Recovery and Risk Factors Questionnaire:      7/30/2025     6:49 AM   Stroke Questionnaire   Residual effects: Any residual effects from stroke? Yes, I do   Residual effects: Most bothersome symptom Lightheadedness   Level of independence: Walking Independent   Level of independence: Eating Independent    Level of independence: Stairs Independent   Level of independence: Dressing Independent   Level of independence: Bathing Independent   Level of independence: Toileting Independent   Incontinence: Bowel? No   Incontinence: Bladder? No   Able to: Use electronics Yes   Able to: Cook or do house chores Yes   Able to: Do own shopping, including online Yes   Able to: Manage own finances Yes   Current therapy: Physical Therapy No   Current therapy: Occupation Therapy No   Current therapy: Speech Therapy No   Current therapy: Other No   Current services: Home Health No, not needed   Medication: How do you take your meds Myself, from individual bottles   Medication: Do you ever miss or forget meds Rarely   Risk Factor: Checking blood pressure at home Yes   Risk Factor: Usual blood pressure numbers Within normal range   Risk Factor: Checking blood sugar at home No   Risk Factor: Second-hand smoke at home No   Risk Factor: How much caffeine per day I cup of coffee a day   Who completed this questionnaire? The patient independently             Assessment and Plan       # May 2025  right occipital ischemic infarct with right PCA occlusion  Suspect related to large artery intracranial atherosclerotic disease (ICAD) versus less likely embolic stroke of undetermined source (ESUS), reassuringly Zio patch was negative for atrial fibrillation  # Chronic left cerebellar ischemic infarct  # History of atrial fibrillation  Felt to be provoked following lumbar surgery without recurrence on subsequent heart monitoring  -Continue aspirin 325 mg daily indefinitely (instructed patient not to stop without stroke neurology involvement)  -Stop taking Plavix 75 mg daily after completing total 90-day course (last day 8/28/2025)  -Follow-up again with Isabel Olivares PA-C in 6 months  -Education provided about stroke BE FAST and ICH symptoms and GI bleeding risk     # 44 episodes of asymptomatic SVT seen on heart monitor  # Episodes of  lightheadedness/dizziness felt to be due to orthostatic hypotension, query if potentially may be associated with some episodes of SVT?  - Follow-up with primary care provider for further recommendations and consideration for cardiology referral    # Hyperlipidemia  -Continue atorvastatin 40 mg daily  -LDL goal 40-70, <40 increases risk of intracranial hemorrhage  -Defer to PCP for ongoing titration    # Hypertension  -Defer to PCP for antihypertensive management.  - BP goal <130/80 (with tighter control if tolerated)    # Prediabetes  -Defer to PCP for diabetes prevention, long-term A1c goal less than 7%.    # Snoring and daytime sleepiness, query sleep apnea  - Follow-up as scheduled 8/21/2025 with sleep medicine clinic for testing     # Chronic depression  - Takes Prozac and has a psychologist  -Recommend to reach out to providers if any symptoms worsen      - Return in about 6 months (around 1/31/2026) for with Isabel Olivares PA-C.    Stroke Education provided.  She will call us with any questions.  For any acute neurologic deficits she was advised to  go directly to the hospital rather than call the clinic.      Isabel Olivares PA-C  Neurology  07/31/2025     ____________________________________________________________________    Billing:  Please note: for coding purposes this visit should be billed only as established and not new, since this patient was seen by my same subspecialty team ([facility]) during the hospitalization that this visit is a follow up for.  I spent a total of 60 minutes on the day of the visit.   Time spent by me today doing chart review, history and exam, documentation and further activities per the note      *All or a portion of this note was generated using voice recognition software and may contain transcription errors.    Again, thank you for allowing me to participate in the care of your patient.        Sincerely,        Isabel Olivares PA-C    Electronically  signed

## 2025-08-11 ENCOUNTER — TRANSFERRED RECORDS (OUTPATIENT)
Dept: HEALTH INFORMATION MANAGEMENT | Facility: CLINIC | Age: 80
End: 2025-08-11
Payer: COMMERCIAL

## 2025-08-21 ENCOUNTER — OFFICE VISIT (OUTPATIENT)
Dept: SLEEP MEDICINE | Facility: CLINIC | Age: 80
End: 2025-08-21
Payer: COMMERCIAL

## 2025-08-21 VITALS
OXYGEN SATURATION: 98 % | SYSTOLIC BLOOD PRESSURE: 142 MMHG | BODY MASS INDEX: 23.95 KG/M2 | HEIGHT: 68 IN | DIASTOLIC BLOOD PRESSURE: 81 MMHG | RESPIRATION RATE: 16 BRPM | HEART RATE: 68 BPM | WEIGHT: 158 LBS

## 2025-08-21 DIAGNOSIS — G47.00 INSOMNIA, UNSPECIFIED TYPE: ICD-10-CM

## 2025-08-21 DIAGNOSIS — G47.9 SLEEP DISTURBANCE: Primary | ICD-10-CM

## 2025-08-21 DIAGNOSIS — R06.83 SNORING: ICD-10-CM

## 2025-08-21 DIAGNOSIS — R35.1 NOCTURIA: ICD-10-CM

## 2025-08-21 DIAGNOSIS — R40.0 DAYTIME SOMNOLENCE: ICD-10-CM

## 2025-08-21 DIAGNOSIS — I63.9 ACUTE CVA (CEREBROVASCULAR ACCIDENT) (H): ICD-10-CM

## 2025-08-21 DIAGNOSIS — G25.81 RESTLESS LEGS SYNDROME (RLS): ICD-10-CM

## 2025-08-21 RX ORDER — ZOLPIDEM TARTRATE 5 MG/1
TABLET ORAL
Qty: 1 TABLET | Refills: 0 | Status: SHIPPED | OUTPATIENT
Start: 2025-08-21

## 2025-08-21 ASSESSMENT — SLEEP AND FATIGUE QUESTIONNAIRES
HOW LIKELY ARE YOU TO NOD OFF OR FALL ASLEEP WHILE LYING DOWN TO REST IN THE AFTERNOON WHEN CIRCUMSTANCES PERMIT: HIGH CHANCE OF DOZING
HOW LIKELY ARE YOU TO NOD OFF OR FALL ASLEEP WHILE SITTING AND TALKING TO SOMEONE: WOULD NEVER DOZE
HOW LIKELY ARE YOU TO NOD OFF OR FALL ASLEEP IN A CAR, WHILE STOPPED FOR A FEW MINUTES IN TRAFFIC: WOULD NEVER DOZE
HOW LIKELY ARE YOU TO NOD OFF OR FALL ASLEEP WHEN YOU ARE A PASSENGER IN A CAR FOR AN HOUR WITHOUT A BREAK: SLIGHT CHANCE OF DOZING
HOW LIKELY ARE YOU TO NOD OFF OR FALL ASLEEP WHILE WATCHING TV: WOULD NEVER DOZE
HOW LIKELY ARE YOU TO NOD OFF OR FALL ASLEEP WHILE SITTING QUIETLY AFTER LUNCH WITHOUT ALCOHOL: HIGH CHANCE OF DOZING
HOW LIKELY ARE YOU TO NOD OFF OR FALL ASLEEP WHILE SITTING INACTIVE IN A PUBLIC PLACE: WOULD NEVER DOZE
HOW LIKELY ARE YOU TO NOD OFF OR FALL ASLEEP WHILE SITTING AND READING: SLIGHT CHANCE OF DOZING

## 2025-08-25 ENCOUNTER — PATIENT OUTREACH (OUTPATIENT)
Dept: CARE COORDINATION | Facility: CLINIC | Age: 80
End: 2025-08-25
Payer: COMMERCIAL

## 2025-09-04 ENCOUNTER — OFFICE VISIT (OUTPATIENT)
Dept: FAMILY MEDICINE | Facility: CLINIC | Age: 80
End: 2025-09-04
Payer: COMMERCIAL

## 2025-09-04 VITALS
TEMPERATURE: 98 F | RESPIRATION RATE: 18 BRPM | OXYGEN SATURATION: 98 % | BODY MASS INDEX: 23.63 KG/M2 | HEART RATE: 74 BPM | SYSTOLIC BLOOD PRESSURE: 138 MMHG | DIASTOLIC BLOOD PRESSURE: 80 MMHG | WEIGHT: 155.9 LBS | HEIGHT: 68 IN

## 2025-09-04 DIAGNOSIS — Z23 NEED FOR INFLUENZA VACCINATION: ICD-10-CM

## 2025-09-04 DIAGNOSIS — E03.9 HYPOTHYROIDISM, UNSPECIFIED TYPE: ICD-10-CM

## 2025-09-04 DIAGNOSIS — I63.9 ACUTE ISCHEMIC STROKE (H): Primary | ICD-10-CM

## 2025-09-04 DIAGNOSIS — I10 BENIGN ESSENTIAL HYPERTENSION: ICD-10-CM

## 2025-09-04 DIAGNOSIS — R74.01 ELEVATED AST (SGOT): ICD-10-CM

## 2025-09-04 LAB — HOLD SPECIMEN: NORMAL

## 2025-09-04 RX ORDER — LISINOPRIL 5 MG/1
5 TABLET ORAL DAILY
Qty: 30 TABLET | Refills: 1 | Status: SHIPPED | OUTPATIENT
Start: 2025-09-04

## 2025-09-04 ASSESSMENT — PAIN SCALES - GENERAL: PAINLEVEL_OUTOF10: NO PAIN (0)
